# Patient Record
Sex: FEMALE | Race: WHITE | NOT HISPANIC OR LATINO | Employment: FULL TIME | URBAN - METROPOLITAN AREA
[De-identification: names, ages, dates, MRNs, and addresses within clinical notes are randomized per-mention and may not be internally consistent; named-entity substitution may affect disease eponyms.]

---

## 2017-01-27 ENCOUNTER — LAB CONVERSION - ENCOUNTER (OUTPATIENT)
Dept: OTHER | Facility: OTHER | Age: 57
End: 2017-01-27

## 2017-01-27 LAB — TSH SERPL DL<=0.05 MIU/L-ACNC: 1.75 MIU/L (ref 0.4–4.5)

## 2017-04-12 ENCOUNTER — ALLSCRIPTS OFFICE VISIT (OUTPATIENT)
Dept: OTHER | Facility: OTHER | Age: 57
End: 2017-04-12

## 2017-04-12 DIAGNOSIS — G56.00 CARPAL TUNNEL SYNDROME: ICD-10-CM

## 2017-04-12 DIAGNOSIS — J45.909 UNCOMPLICATED ASTHMA: ICD-10-CM

## 2017-04-12 DIAGNOSIS — J30.9 ALLERGIC RHINITIS: ICD-10-CM

## 2017-04-12 DIAGNOSIS — E78.5 HYPERLIPIDEMIA: ICD-10-CM

## 2017-08-04 ENCOUNTER — GENERIC CONVERSION - ENCOUNTER (OUTPATIENT)
Dept: OTHER | Facility: OTHER | Age: 57
End: 2017-08-04

## 2017-08-15 LAB
A/G RATIO (HISTORICAL): 1.8 (CALC) (ref 1–2.5)
ALBUMIN SERPL BCP-MCNC: 4.1 G/DL (ref 3.6–5.1)
ALP SERPL-CCNC: 83 U/L (ref 33–130)
ALT SERPL W P-5'-P-CCNC: 14 U/L (ref 6–29)
AST SERPL W P-5'-P-CCNC: 17 U/L (ref 10–35)
BASOPHILS # BLD AUTO: 0.7 %
BASOPHILS # BLD AUTO: 53 CELLS/UL (ref 0–200)
BILIRUB SERPL-MCNC: 0.7 MG/DL (ref 0.2–1.2)
BUN SERPL-MCNC: 19 MG/DL (ref 7–25)
BUN/CREA RATIO (HISTORICAL): NORMAL (CALC) (ref 6–22)
CALCIUM SERPL-MCNC: 9.4 MG/DL (ref 8.6–10.4)
CHLORIDE SERPL-SCNC: 105 MMOL/L (ref 98–110)
CHOLEST SERPL-MCNC: 238 MG/DL (ref 125–200)
CHOLEST/HDLC SERPL: 3.8 (CALC)
CO2 SERPL-SCNC: 27 MMOL/L (ref 20–31)
CREAT SERPL-MCNC: 0.8 MG/DL (ref 0.5–1.05)
DEPRECATED RDW RBC AUTO: 13.3 % (ref 11–15)
EGFR AFRICAN AMERICAN (HISTORICAL): 94 ML/MIN/1.73M2
EGFR-AMERICAN CALC (HISTORICAL): 81 ML/MIN/1.73M2
EOSINOPHIL # BLD AUTO: 248 CELLS/UL (ref 15–500)
EOSINOPHIL # BLD AUTO: 3.3 %
GAMMA GLOBULIN (HISTORICAL): 2.3 G/DL (CALC) (ref 1.9–3.7)
GLUCOSE (HISTORICAL): 93 MG/DL (ref 65–99)
HCT VFR BLD AUTO: 39.5 % (ref 35–45)
HDLC SERPL-MCNC: 62 MG/DL
HGB BLD-MCNC: 12.6 G/DL (ref 11.7–15.5)
LDL CHOLESTEROL (HISTORICAL): 160 MG/DL (CALC)
LYMPHOCYTES # BLD AUTO: 2760 CELLS/UL (ref 850–3900)
LYMPHOCYTES # BLD AUTO: 36.8 %
MCH RBC QN AUTO: 27.5 PG (ref 27–33)
MCHC RBC AUTO-ENTMCNC: 31.9 G/DL (ref 32–36)
MCV RBC AUTO: 86.1 FL (ref 80–100)
MONOCYTES # BLD AUTO: 630 CELLS/UL (ref 200–950)
MONOCYTES (HISTORICAL): 8.4 %
NEUTROPHILS # BLD AUTO: 3810 CELLS/UL (ref 1500–7800)
NEUTROPHILS # BLD AUTO: 50.8 %
NON-HDL-CHOL (CHOL-HDL) (HISTORICAL): 176 MG/DL (CALC)
PLATELET # BLD AUTO: 268 THOUSAND/UL (ref 140–400)
PMV BLD AUTO: 10.1 FL (ref 7.5–12.5)
POTASSIUM SERPL-SCNC: 4.2 MMOL/L (ref 3.5–5.3)
RBC # BLD AUTO: 4.59 MILLION/UL (ref 3.8–5.1)
SODIUM SERPL-SCNC: 140 MMOL/L (ref 135–146)
TOTAL PROTEIN (HISTORICAL): 6.4 G/DL (ref 6.1–8.1)
TRIGL SERPL-MCNC: 78 MG/DL
WBC # BLD AUTO: 7.5 THOUSAND/UL (ref 3.8–10.8)

## 2017-10-17 ENCOUNTER — ALLSCRIPTS OFFICE VISIT (OUTPATIENT)
Dept: OTHER | Facility: OTHER | Age: 57
End: 2017-10-17

## 2017-10-17 DIAGNOSIS — E78.5 HYPERLIPIDEMIA: ICD-10-CM

## 2017-10-17 DIAGNOSIS — J45.909 UNCOMPLICATED ASTHMA: ICD-10-CM

## 2017-11-01 NOTE — PROGRESS NOTES
Assessment    1  Asthma (493 90) (J45 909)   2  Hyperlipidemia (272 4) (E78 5)    Plan  Asthma, Hyperlipidemia    · (1) CBC/PLT/DIFF; Status:Active; Requested for:17Oct2017;    · (1) COMPREHENSIVE METABOLIC PANEL; Status:Active; Requested for:17Oct2017;    · (1) LIPID PANEL, FASTING; Status:Active; Requested LPH:[id number];   Immunization due    · Fluzone Quadrivalent Intramuscular Suspension    Discussion/Summary    #E#1 asthma - mild/intermittent  PT remains well-controlled on low dose Advair and is up to date with allergist    Continue present treatment  Recheck 6m  hyperlipidemia - LDL still high, partly due to diet noncompliance  Pt is still working on diet  Cont present meds  Recheck 6m health maintenance - up to date with colonoscopy and mammo  Flu shot given  Recheck labs in Feb and f/u 6m  Patient call for problems or concerns in the interim  The patient was counseled regarding diagnostic results,-- instructions for management,-- risk factor reductions,-- prognosis,-- patient and family education,-- impressions,-- risks and benefits of treatment options,-- importance of compliance with treatment  Possible side effects of new medications were reviewed with the patient/guardian today  The treatment plan was reviewed with the patient/guardian  The patient/guardian understands and agrees with the treatment plan      Chief Complaint  [address] [facility],[address] VISIT   Patient is here today for follow up of chronic conditions described in HPI  History of Present Illness  as above -f/u with hyperlipidemiapt still struggles with some bereavement but feels that she is better  Still cries at times but denies depression or anhedoniapt still not sure that she wants to get screened for Fahr's disease    no CP, lightheadedness or other CV symptoms with or without exertion  breathing stable on Advair twice a day again  Needed a rescue inhaler while on vacation in [place] but has not needed it since  CTS stable with bracing qHS  Hips bother her a little with overuse but are stable as wellup to date with Gyn, allergist, and colonoscopy (2015)  Up to date with mammo      Review of Systems   Constitutional: No fever, no chills, feels well, no tiredness, no recent weight gain or weight loss  Eyes: No complaints of eye pain, no red eyes, no eyesight problems, no discharge, no dry eyes, no itching of eyes  ENT: no complaints of earache, no loss of hearing, no nose bleeds, no nasal discharge, no sore throat, no hoarseness  Cardiovascular: as noted in HPI  Respiratory: No complaints of shortness of breath, no wheezing, no cough, no SOB on exertion, no orthopnea, no PND  Gastrointestinal: No complaints of abdominal pain, no constipation, no nausea or vomiting, no diarrhea, no bloody stools  Genitourinary: No complaints of dysuria, no incontinence, no pelvic pain, no dysmenorrhea, no vaginal discharge or bleeding  Musculoskeletal: as noted in HPI  Integumentary: No complaints of skin rash or lesions, no itching, no skin wounds, no breast pain or lump  Neurological: No complaints of headache, no confusion, no convulsions, no numbness, no dizziness or fainting, no tingling, no limb weakness, no difficulty walking  Psychiatric: as noted in HPI  Endocrine: No complaints of proptosis, no hot flashes, no muscle weakness, no deepening of the voice, no feelings of weakness  Hematologic/Lymphatic: No complaints of swollen glands, no swollen glands in the neck, does not bleed easily, does not bruise easily  Active Problems    1  Abscess Of The [place] Vaginalis (608 4)   2  Allergic rhinitis (477 9) (J30 9)   3  Asthma (493 90) (J45 909)   4  Bereavement (V62 82) (Z63 4)   5  Carpal tunnel syndrome (354 0) (G56 00)   6  Degenerative arthritis of thumb, right (715 34) (M18 11)   7  Encounter for screening mammogram for malignant neoplasm of breast (V76 12) (Z12 31)   8  Greater trochanteric bursitis of left hip (726 5) (M70 62)   9  Hyperlipidemia (272 4) (E78 5)   10  Immunization due (V05 9) (Z23)   11  Need for immunization against influenza (V04 81) (Z23)   12  Osteopenia (733 90) (M85 80)   13  Sacroiliitis (720 2) (M46 1)   14  Screening for osteoporosis (V82 81) (Z13 820)   15  Visit for pre-operative examination (V72 84) (S87 976)    Past Medical History    The active problems and past medical history were reviewed and updated today  Surgical History  1  History of Oral Surgery Tooth Extraction    The surgical history was reviewed and updated today  Family History  Mother    1  Family history of Colon Cancer (V16 0)   2  Family history of Fahr's disease   3  Family history of Hyperlipidemia  Father    4  Family history of Stroke Syndrome (V17 1)    The family history was reviewed and updated today  Social History     · Alcohol Use (History)   · Bereavement (V62 82) (Z63 4)   · Daily Coffee Consumption (___ Cups/Day)   · Daily Cola Consumption (___ Cans/Day)   · Daily Tea Consumption (___ Cups/Day)   · Marital History - Single   · Never A Smoker  The social history was reviewed and updated today  Current Meds   1  Advair Diskus 100-50 MCG/DOSE Inhalation Aerosol Powder Breath Activated; Therapy: 86URU8934 to (Last Rx:17Jun2010)  Requested for: 56YDN1636 Ordered   2  Calcium 600 MG Oral Tablet; take 2 tablet daily; Therapy: (Recorded:00Bkx4869) to Recorded   3  Claritin 10 MG Oral Tablet; take 1 tablet daily as needed Recorded   4  Crestor 20 MG Oral Tablet; Take 1 tablet daily; Therapy: 09IJZ1357 to (Evaluate:02Apr2017)  Requested for: 07Apr2016; Last Rx:07Apr2016 Ordered   5  [employer] Glucosamine-Chondroitin Oral Tablet; Take as directed Recorded   6  Multivitamins Oral Capsule; TAKE 1 CAPSULE Daily Recorded   7  Vitamin D 400 UNIT TABS; Therapy: (Recorded:90Mlm5336) to Recorded    The medication list was reviewed and updated today  Allergies  1   Penicillins    Vitals  Vital Signs    Recorded: 50LMQ8807 07:47AM Temperature 97 6 F   Heart Rate 74   Respiration 16   Systolic 919   Diastolic 68   Height 4 ft 9 in   Weight 166 lb 4 oz   BMI Calculated 35 98   BSA Calculated 1 66       Physical Exam   Constitutional  General appearance: Abnormal  -- mildly upset appearing female in NAD  Head and Face  Head and face: Normal    Eyes  Conjunctiva and lids: No swelling, erythema or discharge  Pupils and irises: Equal, round, reactive to light  Ophthalmoscopic examination: Normal fundi and optic discs  Ears, Nose, Mouth, and Throat  External inspection of ears and nose: Normal    Otoscopic examination: Tympanic membranes translucent with normal light reflex  Canals patent without erythema  Nasal mucosa, septum, and turbinates: Normal without edema or erythema  Lips, teeth, and gums: Normal, good dentition  Oropharynx: Normal with no erythema, edema, exudate or lesions  Neck  Neck: Supple, symmetric, trachea midline, no masses  Thyroid: Normal, no thyromegaly  Pulmonary  Respiratory effort: No increased work of breathing or signs of respiratory distress  Auscultation of lungs: Clear to auscultation  Cardiovascular  Auscultation of heart: Normal rate and rhythm, normal S1 and S2, no murmurs  Carotid pulses: 2+ bilaterally  Abdominal aorta: Normal    Pedal pulses: 2+ bilaterally  Peripheral vascular exam: Normal    Examination of extremities for edema and/or varicosities: Normal    Abdomen  Abdomen: Non-tender, no masses  Liver and spleen: No hepatomegaly or splenomegaly  Lymphatic  Palpation of lymph nodes in neck: No lymphadenopathy  Palpation of lymph nodes in other areas: No lymphadenopathy  Musculoskeletal  Gait and station: Normal    Digits and nails: Normal without clubbing or cyanosis  Joints, bones, and muscles: Normal    Muscle strength/tone: Normal    Skin  Skin and subcutaneous tissue: Normal without rashes or lesions  Neurologic  Cranial nerves: Cranial nerves II-XII intact  Cortical function: Normal mental status  Reflexes: 2+ and symmetric  Sensation: No sensory loss  Psychiatric  Mood and affect: Abnormal  -- as above  Results/Data  (1) LIPID PANEL, FASTING 37RCC2057 07:41AM Candi Lucero     Test Name Result Flag Reference   CHOLESTEROL, TOTAL 238 mg/dL H 125-200   HDL CHOLESTEROL 62 mg/dL  > OR = 46   TRIGLICERIDES 78 mg/dL  <301   LDL-CHOLESTEROL 160 mg/dL (calc) H <130     Desirable range <100 mg/dL for patients with CHD or diabetes and <70 mg/dL for diabetic patients with known heart disease  CHOL/HDLC RATIO 3 8 (calc)  < OR = 5 0   NON HDL CHOLESTEROL 176 mg/dL (calc) H      Target for non-HDL cholesterol is 30 mg/dL higher than  LDL cholesterol target  (1) COMPREHENSIVE METABOLIC PANEL 51YYJ7486 43:30YP Candi Lucero     Test Name Result Flag Reference   GLUCOSE 93 mg/dL  65-99   Fasting reference interval   UREA NITROGEN (BUN) 19 mg/dL  7-25   CREATININE 0 80 mg/dL  0 50-1 05     For patients >52years of age, the reference limit for Creatinine is approximately 13% higher for people identified as -American  eGFR NON-AFR   AMERICAN 81 mL/min/1 73m2  > OR = 60   eGFR AFRICAN AMERICAN 94 mL/min/1 73m2  > OR = 60   BUN/CREATININE RATIO   6-48   NOT APPLICABLE (calc)   SODIUM 140 mmol/L  135-146   POTASSIUM 4 2 mmol/L  3 5-5 3   CHLORIDE 105 mmol/L     CARBON DIOXIDE 27 mmol/L  20-31   CALCIUM 9 4 mg/dL  8 6-10 4   PROTEIN, TOTAL 6 4 g/dL  6 1-8 1   ALBUMIN 4 1 g/dL  3 6-5 1   GLOBULIN 2 3 g/dL (calc)  1 9-3 7   ALBUMIN/GLOBULIN RATIO 1 8 (calc)  1 0-2 5   BILIRUBIN, TOTAL 0 7 mg/dL  0 2-1 2   ALKALINE PHOSPHATASE 83 U/L     AST 17 U/L  10-35   ALT 14 U/L  6-29     (1) CBC/PLT/DIFF [id number] 07:41AM Brice Lucero     REPORT COMMENT: FASTING:YES     Test Name Result Flag Reference   WHITE BLOOD CELL COUNT 7 5 Thousand/uL  3 8-10 8   RED BLOOD CELL COUNT 4 59 Million/uL  3 80-5 10   HEMOGLOBIN 12 6 g/dL  11 7-15 5 HEMATOCRIT 39 5 %  35 0-45 0   MCV 86 1 fL  80 0-100 0   MCH 27 5 pg  27 0-33 0   MCHC 31 9 g/dL L 32 0-36 0   RDW 13 3 %  11 0-15 0   PLATELET COUNT 739 Thousand/uL  140-400   ABSOLUTE NEUTROPHILS 3810 cells/uL  7101-9886   ABSOLUTE LYMPHOCYTES 2760 cells/uL  850-3900   ABSOLUTE MONOCYTES 630 cells/uL  200-950   ABSOLUTE EOSINOPHILS 248 cells/uL     ABSOLUTE BASOPHILS 53 cells/uL  0-200   NEUTROPHILS 50 8 %     LYMPHOCYTES 36 8 %     MONOCYTES 8 4 %     EOSINOPHILS 3 3 %     BASOPHILS 0 7 %     MPV 10 1 fL  7 5-12 5     Future Appointments    Date/Time Provider Specialty Site   04/24/2018 08:00 AM LESLEE Lim   [address]       Signatures   Electronically signed by : LESLEE Simms ; Oct 17 2017  8:38AM EST                       (Author)

## 2018-01-12 VITALS
WEIGHT: 166.25 LBS | HEIGHT: 57 IN | DIASTOLIC BLOOD PRESSURE: 68 MMHG | TEMPERATURE: 97.6 F | SYSTOLIC BLOOD PRESSURE: 124 MMHG | HEART RATE: 74 BPM | RESPIRATION RATE: 16 BRPM | BODY MASS INDEX: 35.87 KG/M2

## 2018-01-14 VITALS
BODY MASS INDEX: 34.43 KG/M2 | RESPIRATION RATE: 16 BRPM | TEMPERATURE: 97.6 F | HEIGHT: 58 IN | HEART RATE: 74 BPM | WEIGHT: 164 LBS | DIASTOLIC BLOOD PRESSURE: 64 MMHG | SYSTOLIC BLOOD PRESSURE: 122 MMHG

## 2018-03-17 DIAGNOSIS — E78.00 HYPERCHOLESTEROLEMIA: Primary | ICD-10-CM

## 2018-03-18 RX ORDER — ROSUVASTATIN CALCIUM 20 MG/1
TABLET, COATED ORAL
Qty: 90 TABLET | Refills: 3 | Status: SHIPPED | OUTPATIENT
Start: 2018-03-18 | End: 2019-04-03 | Stop reason: SDUPTHER

## 2018-04-24 ENCOUNTER — OFFICE VISIT (OUTPATIENT)
Dept: FAMILY MEDICINE CLINIC | Facility: CLINIC | Age: 58
End: 2018-04-24
Payer: COMMERCIAL

## 2018-04-24 VITALS
TEMPERATURE: 97.6 F | HEIGHT: 58 IN | DIASTOLIC BLOOD PRESSURE: 82 MMHG | HEART RATE: 74 BPM | WEIGHT: 166.4 LBS | BODY MASS INDEX: 34.93 KG/M2 | SYSTOLIC BLOOD PRESSURE: 124 MMHG

## 2018-04-24 DIAGNOSIS — G89.29 CHRONIC BILATERAL LOW BACK PAIN WITH LEFT-SIDED SCIATICA: ICD-10-CM

## 2018-04-24 DIAGNOSIS — J45.20 MILD INTERMITTENT ASTHMA WITHOUT COMPLICATION: ICD-10-CM

## 2018-04-24 DIAGNOSIS — E78.2 MIXED HYPERLIPIDEMIA: Primary | ICD-10-CM

## 2018-04-24 DIAGNOSIS — M54.42 CHRONIC BILATERAL LOW BACK PAIN WITH LEFT-SIDED SCIATICA: ICD-10-CM

## 2018-04-24 PROCEDURE — 99214 OFFICE O/P EST MOD 30 MIN: CPT | Performed by: FAMILY MEDICINE

## 2018-04-24 RX ORDER — IBUPROFEN 200 MG
2 CAPSULE ORAL DAILY
COMMUNITY

## 2018-04-24 RX ORDER — MULTIVITAMIN
1 CAPSULE ORAL DAILY
COMMUNITY

## 2018-04-24 RX ORDER — ERGOCALCIFEROL (VITAMIN D2) 10 MCG
TABLET ORAL
COMMUNITY

## 2018-04-24 RX ORDER — LORATADINE 10 MG/1
1 TABLET ORAL DAILY
COMMUNITY

## 2018-04-30 ENCOUNTER — EVALUATION (OUTPATIENT)
Dept: PHYSICAL THERAPY | Facility: CLINIC | Age: 58
End: 2018-04-30
Payer: COMMERCIAL

## 2018-04-30 DIAGNOSIS — G89.29 CHRONIC BILATERAL LOW BACK PAIN WITH LEFT-SIDED SCIATICA: Primary | ICD-10-CM

## 2018-04-30 DIAGNOSIS — M54.42 CHRONIC BILATERAL LOW BACK PAIN WITH LEFT-SIDED SCIATICA: Primary | ICD-10-CM

## 2018-04-30 PROCEDURE — 97112 NEUROMUSCULAR REEDUCATION: CPT | Performed by: PHYSICAL THERAPIST

## 2018-04-30 PROCEDURE — 97162 PT EVAL MOD COMPLEX 30 MIN: CPT | Performed by: PHYSICAL THERAPIST

## 2018-04-30 PROCEDURE — 97110 THERAPEUTIC EXERCISES: CPT | Performed by: PHYSICAL THERAPIST

## 2018-04-30 PROCEDURE — G8991 OTHER PT/OT GOAL STATUS: HCPCS | Performed by: PHYSICAL THERAPIST

## 2018-04-30 PROCEDURE — G8990 OTHER PT/OT CURRENT STATUS: HCPCS | Performed by: PHYSICAL THERAPIST

## 2018-04-30 NOTE — PROGRESS NOTES
PT Evaluation     Today's date: 2018  Patient name: Katja Estevez  : 1960  MRN: [id number]  Referring provider: Josiah Kincaid  Dx:   Encounter Diagnosis     ICD-10-CM    1  Chronic bilateral low back pain with left-sided sciatica M54 42 Ambulatory referral to Physical Therapy    G89 29                   Assessment  Impairments: abnormal gait, abnormal or restricted ROM, activity intolerance, impaired physical strength, lacks appropriate home exercise program and pain with function    Assessment details: Patient presents chronic recurring low back pain and symptoms consistent with gluteal bursitis  Treatment to include exercises for the low back, pelvis and legs  A home exercise program was initiated  Understanding of Dx/Px/POC: good   Prognosis: good    Goals  STG Patient is independent with HEP   STG Increase strength by half grade in 2 weeks  STG Range of motion is improved by 25% in 4 weeks  LTG Decrease pain 20-50% in 4 weeks  LTG Balance & endurance & gait & locomotion are improved by 50% in 4 weeks  LTG Stair climbing is improved to prior level of function  LTG ADL performance improved to prior level of function        Plan  Patient would benefit from: skilled PT  Planned therapy interventions: manual therapy, graded exercise, graded activity, home exercise program, therapeutic activities, therapeutic exercise, strengthening and functional ROM exercises  Frequency: 1x week  Duration in visits: 4  Duration in weeks: 4        Subjective Evaluation    History of Present Illness  Mechanism of injury: Patient reports multi-year history of low back pain that increases with a lot of lifting and bending activities  Over 6 months ago she had gradual onset left lateral thigh pain from the hip to the knee that increases with housework, prolonged sitting (~1hour), prolonged standing, sometimes lying on the right side > left     She also has more recent onset right lateral thigh pain that increases with stair climbing and going up hills  She notes difficulty crossing her legs  Pain  At best pain ratin  At worst pain ratin  Quality: dull ache    Patient Goals  Patient goals for therapy: increased strength, independence with ADLs/IADLs, return to sport/leisure activities and decreased pain          Objective     Active Range of Motion     Lumbar   Flexion: WFL  Extension: 18 degrees with pain  Left lateral flexion: WFL  Right lateral flexion: [facility]    Additional Active Range of Motion Details  Posture:  Left lateral pelvic shift, mild    Strength/Myotome Testing     Left Hip   Planes of Motion   Flexion: 4  Extension: 4+  Abduction: 3+  External rotation: 4+  Internal rotation: 5    Right Hip   Planes of Motion   Flexion: 4  Extension: 4+  Abduction: 3+  External rotation: 4+  Internal rotation: 5    General Comments     Lumbar Comments  Tender to palpation:  Left SIJ, left L5 paraspinals, bilateral glut medius insertion, left ITB      Flowsheet Rows      Most Recent Value   PT/OT G-Codes   Current Score  61   Projected Score  70   Assessment Type  Evaluation   G code set  Other PT/OT Primary   Other PT Primary Current Status ()  CJ   Other PT Primary Goal Status ()  CJ          Precautions: none    Daily Treatment Diary     Manual              ITB stretching? Sacral/Pelvic rocking                                                        Exercise Diary              SKC :10x5            Glut stretch :10x5            Bridges  With ER 20x red T            Piriformis stretch :10x5            Standing ITB stretch :10x5            Standing hip 3way DLS             Heel touches?                                                                                                                                                                                           Modalities

## 2018-05-09 ENCOUNTER — OFFICE VISIT (OUTPATIENT)
Dept: PHYSICAL THERAPY | Facility: CLINIC | Age: 58
End: 2018-05-09
Payer: COMMERCIAL

## 2018-05-09 DIAGNOSIS — G89.29 CHRONIC BILATERAL LOW BACK PAIN WITH LEFT-SIDED SCIATICA: Primary | ICD-10-CM

## 2018-05-09 DIAGNOSIS — M54.42 CHRONIC BILATERAL LOW BACK PAIN WITH LEFT-SIDED SCIATICA: Primary | ICD-10-CM

## 2018-05-09 PROCEDURE — G8991 OTHER PT/OT GOAL STATUS: HCPCS | Performed by: PHYSICAL THERAPIST

## 2018-05-09 PROCEDURE — G8990 OTHER PT/OT CURRENT STATUS: HCPCS | Performed by: PHYSICAL THERAPIST

## 2018-05-16 ENCOUNTER — OFFICE VISIT (OUTPATIENT)
Dept: PHYSICAL THERAPY | Facility: CLINIC | Age: 58
End: 2018-05-16
Payer: COMMERCIAL

## 2018-05-16 DIAGNOSIS — M54.42 CHRONIC BILATERAL LOW BACK PAIN WITH LEFT-SIDED SCIATICA: Primary | ICD-10-CM

## 2018-05-16 DIAGNOSIS — G89.29 CHRONIC BILATERAL LOW BACK PAIN WITH LEFT-SIDED SCIATICA: Primary | ICD-10-CM

## 2018-05-16 PROCEDURE — 97140 MANUAL THERAPY 1/> REGIONS: CPT | Performed by: PHYSICAL THERAPIST

## 2018-05-16 PROCEDURE — 97112 NEUROMUSCULAR REEDUCATION: CPT | Performed by: PHYSICAL THERAPIST

## 2018-05-16 PROCEDURE — 97110 THERAPEUTIC EXERCISES: CPT | Performed by: PHYSICAL THERAPIST

## 2018-05-16 NOTE — PROGRESS NOTES
Daily Note     Today's date: 2018  Patient name: Nima Gutierrez  : 1960  MRN: [id number]  Referring provider: Mortimer Bream*  Dx:   Encounter Diagnosis     ICD-10-CM    1  Chronic bilateral low back pain with left-sided sciatica M54 42     G89 29                   Subjective: Patient stated moderate stiffness prior to treatment session which she attributed to the weather  Objective: See treatment diary below  Precautions: none     Daily Treatment Diary      Manual                   ITB stretching?    :20x5ea  KK                 Sacral/Pelvic rocking    30x  KK                                                                                               Exercise Diary                   SKC :10x5  :10X5  :10x5                 Glut stretch :10x5  :10X5  :10x5                 Bridges  With ER 20x red T                     Piriformis stretch :10x5                     Standing ITB stretch :10x5                     Standing hip 3way DLS                       Heel touches    2x10  2x 10                  pball bridges   20x  20x                  pball leg curls   20x 20x                  prone on elbows   :30x4  :30x4                  DLS march, flex hip as far as possible   :[id number]  :02x20 ea                  Hip ER isometrics                        Hip add      20x5"                                                                                                                                                                                               Modalities                                                                                                          Assessment: Patient performed exercises without c/o pain; positive response to manual intervention  Plan: Progress treatment as tolerated

## 2018-05-23 ENCOUNTER — OFFICE VISIT (OUTPATIENT)
Dept: PHYSICAL THERAPY | Facility: CLINIC | Age: 58
End: 2018-05-23
Payer: COMMERCIAL

## 2018-05-23 DIAGNOSIS — G89.29 CHRONIC BILATERAL LOW BACK PAIN WITH LEFT-SIDED SCIATICA: Primary | ICD-10-CM

## 2018-05-23 DIAGNOSIS — M54.42 CHRONIC BILATERAL LOW BACK PAIN WITH LEFT-SIDED SCIATICA: Primary | ICD-10-CM

## 2018-05-23 PROCEDURE — 97110 THERAPEUTIC EXERCISES: CPT | Performed by: PHYSICAL THERAPIST

## 2018-05-23 PROCEDURE — 97140 MANUAL THERAPY 1/> REGIONS: CPT | Performed by: PHYSICAL THERAPIST

## 2018-05-23 NOTE — PROGRESS NOTES
PT Re-Evaluation      Today's date: 2018  Patient name: Leland Severs  : 1960  MRN: [id number]  Referring provider: Love Zamora  Dx:         Encounter Diagnosis       ICD-10-CM     1  Chronic bilateral low back pain with left-sided sciatica M54 42 Ambulatory referral to Physical Therapy     G89 29              Assessment  Impairments: abnormal gait, abnormal or restricted ROM, activity intolerance, impaired physical strength, lacks appropriate home exercise program and pain with function    Assessment details: Patient presents chronic recurring low back pain and symptoms consistent with gluteal bursitis  She is progressing well, recommend 1-2 visits to continue progression of exercises and update home exercises  Understanding of Dx/Px/POC: good   Prognosis: good   Goals  STG Patient is independent with HEP (met)  STG Increase strength by half grade in 2 weeks  STG Range of motion is improved by 25% in 4 weeks  LTG Decrease pain 20-50% in 4 weeks (met, resume)  LTG Balance & endurance & gait & locomotion are improved by 50% in 4 weeks  LTG Stair climbing is improved to prior level of function  LTG ADL performance improved to prior level of function (partially met, resume)       Plan  Patient would benefit from: skilled PT  Planned therapy interventions: manual therapy, graded exercise, graded activity, home exercise program, therapeutic activities, therapeutic exercise, strengthening and functional ROM exercises  Frequency: 1x week  Duration in visits: 2  Duration in weeks: 2        Subjective Evaluation     History of Present Illness  Mechanism of injury: Patient reports multi-year history of low back pain that increases with a lot of lifting and bending activities  Over 6 months ago she had gradual onset left lateral thigh pain from the hip to the knee that increases with housework, prolonged sitting (~1hour), prolonged standing, sometimes lying on the right side > left     She also has more recent onset right lateral thigh pain that increases with stair climbing and going up hills  She notes difficulty crossing her legs  Her back and hip pain have both improved and she has been consistent with home exercsies  Pain  At best pain ratin  At worst pain ratin  Quality: dull ache     Patient Goals  Patient goals for therapy: increased strength, independence with ADLs/IADLs, return to sport/leisure activities and decreased pain           Objective      Active Range of Motion      Lumbar   Flexion: WFL  Extension: 18 degrees with pain  Left lateral flexion: WFL  Right lateral flexion: [facility]    Additional Active Range of Motion Details  Posture:  Left lateral pelvic shift, mild     Strength/Myotome Testing      Left Hip   Planes of Motion   Flexion: 4+  Extension: 4+  Abduction: 3+  External rotation: 4+  Internal rotation: 5     Right Hip   Planes of Motion   Flexion: 4+  Extension: 4+  Abduction: 3+  External rotation: 4+  Internal rotation: 5     General Comments      Lumbar Comments  Tender to palpation:  Left SIJ, left L5 paraspinals, bilateral glut medius insertion, left ITB    Daily Note     Today's date: 2018  Patient name: Christy Jiang  : 1960  MRN: [id number]  Referring provider: Tawana Emerson*  Dx:   Encounter Diagnosis     ICD-10-CM    1  Chronic bilateral low back pain with left-sided sciatica M54 42     G89 29                   Subjective: states she is definitely feeling better with decreased hip and back pain  She has been performing exercises at home consistently        Objective: See treatment diary below  Precautions: none     Daily Treatment Diary      Manual                 ITB stretching?    :20x5ea  KK  :20x5               Sacral/Pelvic rocking    30x  KK  30ea                                                                                             Exercise Diary                 [facility] :10x5  :10X5  :10x5                 Glut stretch :10x5  :10X5  :10x5  :20x3               Bridges  With ER 20x red T                     Piriformis stretch :10x5                     Standing ITB stretch :10x5                     Standing hip 3way DLS        20XQ               Heel touches    2x10  2x 10  2x10                pball bridges   20x  20x  20x                pball leg curls   20x 20x  20x                prone on elbows   :30x4  :30x4  HEP                DLS march, flex hip as far as possible   :[id number]  :02x20 ea  2x10                Hip ER isometrics       :05x20                Hip add      20x5"  :05x20                hip flexor stretch leg on 2nd step       :20x4                                                                                                                                                                   Assessment: Patient performed exercises without c/o pain; positive response to manual intervention  Plan: Progress treatment as tolerated

## 2018-05-29 ENCOUNTER — OFFICE VISIT (OUTPATIENT)
Dept: PHYSICAL THERAPY | Facility: CLINIC | Age: 58
End: 2018-05-29
Payer: COMMERCIAL

## 2018-05-29 DIAGNOSIS — M54.42 CHRONIC BILATERAL LOW BACK PAIN WITH LEFT-SIDED SCIATICA: Primary | ICD-10-CM

## 2018-05-29 DIAGNOSIS — G89.29 CHRONIC BILATERAL LOW BACK PAIN WITH LEFT-SIDED SCIATICA: Primary | ICD-10-CM

## 2018-05-29 PROCEDURE — 97140 MANUAL THERAPY 1/> REGIONS: CPT | Performed by: PHYSICAL THERAPY ASSISTANT

## 2018-05-29 PROCEDURE — 97112 NEUROMUSCULAR REEDUCATION: CPT | Performed by: PHYSICAL THERAPY ASSISTANT

## 2018-05-29 PROCEDURE — 97110 THERAPEUTIC EXERCISES: CPT | Performed by: PHYSICAL THERAPY ASSISTANT

## 2018-05-29 NOTE — PROGRESS NOTES
Daily Note     Today's date: 2018  Patient name: Man Gutiérrez  : 1960  MRN: [id number]  Referring provider: Thuan Ortiz  Dx:   Encounter Diagnosis     ICD-10-CM    1  Chronic bilateral low back pain with left-sided sciatica M54 42     G89 29                   Subjective: Pt reports mild anterior hip pain at the start of therapy today  Denies any LBP today  Objective: See treatment diary below  Precautions: none     Daily Treatment Diary      Manual               ITB stretching?    :20x5ea  KK  :20x5  :20 x 5             Sacral/Pelvic rocking    30x  KK  30ea  NP                                                                                           Exercise Diary               SKC :10x5  :10X5  :10x5    :10 x 5             Glut stretch :10x5  :10X5  :10x5  :20x3  :10 x 5             Bridges  With ER 20x red T                     Piriformis stretch :10x5                     Standing ITB stretch :10x5                     Standing hip 3way DLS        45KN  20Z  each             Heel touches    2x10  2x 10  2x10  2x10              pball bridges   20x  20x  20x  20x              pball leg curls   20x 20x  20x  20x              prone on elbows   :30x4  :30x4  HEP                DLS march, flex hip as far as possible   :[id number]  :02x20 ea  2x10  2x10              Hip ER isometrics       :05x20  :05 x 20              Hip add      20x5"  :05x20  :05 x20              hip flexor stretch leg on 2nd step       :20x4  :20 x 4                                                                                                                                                                 Assessment: Patient performed exercises without c/o pain; positive response to manual intervention  Plan: Progress treatment as tolerated

## 2018-05-30 ENCOUNTER — APPOINTMENT (OUTPATIENT)
Dept: PHYSICAL THERAPY | Facility: CLINIC | Age: 58
End: 2018-05-30
Payer: COMMERCIAL

## 2018-05-31 LAB
ALBUMIN SERPL-MCNC: 4.1 G/DL (ref 3.6–5.1)
ALBUMIN/GLOB SERPL: 1.7 (CALC) (ref 1–2.5)
ALP SERPL-CCNC: 91 U/L (ref 33–130)
ALT SERPL-CCNC: 13 U/L (ref 6–29)
AST SERPL-CCNC: 17 U/L (ref 10–35)
BILIRUB SERPL-MCNC: 0.7 MG/DL (ref 0.2–1.2)
BUN SERPL-MCNC: 16 MG/DL (ref 7–25)
BUN/CREAT SERPL: ABNORMAL (CALC) (ref 6–22)
CALCIUM SERPL-MCNC: 9.3 MG/DL (ref 8.6–10.4)
CHLORIDE SERPL-SCNC: 105 MMOL/L (ref 98–110)
CHOLEST SERPL-MCNC: 226 MG/DL
CHOLEST/HDLC SERPL: 3.8 (CALC)
CO2 SERPL-SCNC: 28 MMOL/L (ref 20–31)
CREAT SERPL-MCNC: 0.81 MG/DL (ref 0.5–1.05)
GLOBULIN SER CALC-MCNC: 2.4 G/DL (CALC) (ref 1.9–3.7)
GLUCOSE SERPL-MCNC: 101 MG/DL (ref 65–99)
HDLC SERPL-MCNC: 59 MG/DL
LDLC SERPL CALC-MCNC: 151 MG/DL (CALC)
NONHDLC SERPL-MCNC: 167 MG/DL (CALC)
POTASSIUM SERPL-SCNC: 4.2 MMOL/L (ref 3.5–5.3)
PROT SERPL-MCNC: 6.5 G/DL (ref 6.1–8.1)
SL AMB EGFR AFRICAN AMERICAN: 93 ML/MIN/1.73M2
SL AMB EGFR NON AFRICAN AMERICAN: 80 ML/MIN/1.73M2
SODIUM SERPL-SCNC: 140 MMOL/L (ref 135–146)
TRIGL SERPL-MCNC: 63 MG/DL

## 2018-06-06 ENCOUNTER — EVALUATION (OUTPATIENT)
Dept: PHYSICAL THERAPY | Facility: CLINIC | Age: 58
End: 2018-06-06
Payer: COMMERCIAL

## 2018-06-06 DIAGNOSIS — M54.42 CHRONIC BILATERAL LOW BACK PAIN WITH LEFT-SIDED SCIATICA: Primary | ICD-10-CM

## 2018-06-06 DIAGNOSIS — G89.29 CHRONIC BILATERAL LOW BACK PAIN WITH LEFT-SIDED SCIATICA: Primary | ICD-10-CM

## 2018-06-06 PROCEDURE — 97110 THERAPEUTIC EXERCISES: CPT | Performed by: PHYSICAL THERAPIST

## 2018-06-06 PROCEDURE — G8991 OTHER PT/OT GOAL STATUS: HCPCS | Performed by: PHYSICAL THERAPIST

## 2018-06-06 PROCEDURE — G8990 OTHER PT/OT CURRENT STATUS: HCPCS | Performed by: PHYSICAL THERAPIST

## 2018-06-06 PROCEDURE — 97140 MANUAL THERAPY 1/> REGIONS: CPT | Performed by: PHYSICAL THERAPIST

## 2018-06-06 NOTE — PROGRESS NOTES
Daily Note     Today's date: 2018  Patient name: Maximus Jiang  : 1960  MRN: [id number]  Referring provider: Ruth Ann Lindo*  Dx:   Encounter Diagnosis     ICD-10-CM    1  Chronic bilateral low back pain with left-sided sciatica M54 42     G89 29               Subjective: patient states her back is feeling better and she will continue with exercises at home  Objective: See treatment diary below  Reviewed form on home exercises; no pain reported throughout treatment    Precautions: none     Daily Treatment Diary      Manual             ITB stretching?    :20x5ea  KK  :20x5  :20 x 5  :20x5           Sacral/Pelvic rocking    30x  KK  30ea  NP  30x                                                                                         Exercise Diary             SKC :10x5  :10X5  :10x5    :10 x 5  :10x5 alt with below           Glut stretch :10x5  :10X5  :10x5  :20x3  :10 x 5  :10x5           Bridges  With ER 20x red T                     Piriformis stretch :10x5                     Standing ITB stretch :10x5                     Standing hip 3way DLS        47ZB  23B  each             Heel touches    2x10  2x 10  2x10  2x10  2x10            pball bridges   20x  20x  20x  20x  20x            pball leg curls   20x 20x  20x  20x  30x            prone on elbows   :30x4  :30x4  HEP                DLS march, flex hip as far as possible   :[id number]  :02x20 ea  2x10  2x10  2x10            Hip ER isometrics       :05x20  :05 x 20  :05x20            Hip add      20x5"  :05x20  :05 x20  :05x20            hip flexor stretch leg on 2nd step       :20x4  :20 x 4  :20x4                                                                                                                                                               Assessment: Patient performed exercises without c/o pain; positive response to manual intervention        Plan: discharge to a home exercise program

## 2018-09-05 ENCOUNTER — OFFICE VISIT (OUTPATIENT)
Dept: FAMILY MEDICINE CLINIC | Facility: CLINIC | Age: 58
End: 2018-09-05
Payer: COMMERCIAL

## 2018-09-05 VITALS
HEART RATE: 72 BPM | DIASTOLIC BLOOD PRESSURE: 68 MMHG | HEIGHT: 58 IN | BODY MASS INDEX: 34.51 KG/M2 | TEMPERATURE: 98.2 F | WEIGHT: 164.4 LBS | SYSTOLIC BLOOD PRESSURE: 108 MMHG
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
## 2018-09-05 DIAGNOSIS — L03.116 CELLULITIS OF LEFT LOWER EXTREMITY: ICD-10-CM

## 2018-09-05 PROCEDURE — 3008F BODY MASS INDEX DOCD: CPT | Performed by: FAMILY MEDICINE

## 2018-09-05 PROCEDURE — 99213 OFFICE O/P EST LOW 20 MIN: CPT | Performed by: FAMILY MEDICINE

## 2018-09-05 RX ORDER — DOXYCYCLINE 100 MG/1
100 CAPSULE ORAL 2 TIMES DAILY
Qty: 20 CAPSULE | Refills: 0 | Status: SHIPPED | OUTPATIENT
Start: 2018-09-05 | End: 2018-09-15

## 2018-09-05 NOTE — PROGRESS NOTES
Assessment/Plan:      Diagnoses and all orders for this visit:    Insect bite, initial encounter  -     doxycycline monohydrate (MONODOX) 100 mg capsule; Take 1 capsule (100 mg total) by mouth 2 (two) times a day for 10 days    Cellulitis of left lower extremity  -     doxycycline monohydrate (MONODOX) 100 mg capsule; Take 1 capsule (100 mg total) by mouth 2 (two) times a day for 10 days          Subjective:     Patient ID: Kirill Vargas is a 60 y o  female  Two day history of left lower anterior leg insect bite  Slowly worsening erythema with "ache" radiating up the leg  No fever chills  No improving with topical treatment  Review of Systems   Constitutional: Negative  Musculoskeletal: Positive for myalgias  Negative for arthralgias and gait problem  Skin: Positive for color change, rash and wound  Negative for pallor  Neurological: Negative for weakness and numbness  Hematological: Negative  Objective:     Physical Exam   Constitutional: She appears well-developed and well-nourished  Musculoskeletal:        Legs:  Neurological: No sensory deficit  She exhibits normal muscle tone     Skin:

## 2018-09-07 ENCOUNTER — TELEPHONE (OUTPATIENT)
Dept: FAMILY MEDICINE CLINIC | Facility: CLINIC | Age: 58
End: 2018-09-07

## 2018-09-07 NOTE — TELEPHONE ENCOUNTER
Was to call you back regarding tick bite you saw her for  It is getting better, leg is not aching as much and swelling is going down

## 2019-01-07 ENCOUNTER — OFFICE VISIT (OUTPATIENT)
Dept: FAMILY MEDICINE CLINIC | Facility: CLINIC | Age: 59
End: 2019-01-07
Payer: COMMERCIAL

## 2019-01-07 VITALS
SYSTOLIC BLOOD PRESSURE: 110 MMHG | BODY MASS INDEX: 35.3 KG/M2 | TEMPERATURE: 98 F | HEART RATE: 72 BPM | HEIGHT: 58 IN | WEIGHT: 168.2 LBS | DIASTOLIC BLOOD PRESSURE: 70 MMHG

## 2019-01-07 DIAGNOSIS — G56.03 BILATERAL CARPAL TUNNEL SYNDROME: ICD-10-CM

## 2019-01-07 DIAGNOSIS — E78.2 MIXED HYPERLIPIDEMIA: ICD-10-CM

## 2019-01-07 DIAGNOSIS — B37.2 CANDIDAL DERMATITIS: ICD-10-CM

## 2019-01-07 DIAGNOSIS — Z23 IMMUNIZATION DUE: ICD-10-CM

## 2019-01-07 DIAGNOSIS — J45.20 MILD INTERMITTENT ASTHMA WITHOUT COMPLICATION: Primary | ICD-10-CM

## 2019-01-07 PROCEDURE — 90682 RIV4 VACC RECOMBINANT DNA IM: CPT | Performed by: FAMILY MEDICINE

## 2019-01-07 PROCEDURE — 90471 IMMUNIZATION ADMIN: CPT | Performed by: FAMILY MEDICINE

## 2019-01-07 PROCEDURE — 99214 OFFICE O/P EST MOD 30 MIN: CPT | Performed by: FAMILY MEDICINE

## 2019-01-07 PROCEDURE — 3008F BODY MASS INDEX DOCD: CPT | Performed by: FAMILY MEDICINE

## 2019-01-07 NOTE — ASSESSMENT & PLAN NOTE
LDL still elevated despite rosuvastatin 20mg qd  Cont present care  Monitor diet  Attempt to increase exercise   Recheck 6m

## 2019-01-07 NOTE — PROGRESS NOTES
Assessment/Plan:    Asthma  Stable  Cont present care  Recheck 6m    Carpal tunnel syndrome  No muscle wasting  Discussed with pt  Cont conservative care  Recheck 6m - earlier if worse    Hyperlipidemia  LDL still elevated despite rosuvastatin 20mg qd  Cont present care  Monitor diet  Attempt to increase exercise  Recheck 6m       Diagnoses and all orders for this visit:    Mild intermittent asthma without complication    Mixed hyperlipidemia  -     Comprehensive metabolic panel; Future  -     Lipid panel; Future    Bilateral carpal tunnel syndrome    Candidal dermatitis  Comments:  recurrent though not presently active  refilled meds  Orders:  -     econazole nitrate 1 % cream; Apply topically daily    Immunization due  -     PREFERRED: influenza vaccine, [id number], quadrivalent, recombinant, PF, 0 5 mL, for patients 18 yr+ (FLUBLOK)          Subjective:      Patient ID: Addi Peraza is a 60 y o  female  F/u multiple med issues  - pt doing well  R outer thigh pain improved greatly with PT  Tries to do her home exercises routinely  - pt states that her asthma is stable  Pt compliant with Advair bid and up to date with Allergist    - No Cp, palp, lightheadedness or other CV symptoms with or without exertion  - no GI complaints  Up to date with colonoscopy - due in 2020  - still with occasional CTS symptoms, usually with overuse  Does not feel that symptoms are bad enough to need further eval or treatment at this time  No hand weakness or muscle thinning noted  - no /Gyn issues  Up to date with mammo and Gyn (next appt in Feb)        The following portions of the patient's history were reviewed and updated as appropriate:   She  has a past medical history of Asthma    She   Patient Active Problem List    Diagnosis Date Noted    Chronic bilateral low back pain with left-sided sciatica 04/24/2018    Degenerative arthritis of thumb, right 09/14/2015    Carpal tunnel syndrome 02/19/2015    Osteopenia 08/21/2014    Asthma 02/07/2013    Allergic rhinitis 08/07/2012    Hyperlipidemia 08/07/2012     She  has a past surgical history that includes Tooth extraction and Toe Surgery (Right)  She  reports that she has never smoked  She has never used smokeless tobacco  She reports that she drinks alcohol  She reports that she does not use drugs  Current Outpatient Prescriptions   Medication Sig Dispense Refill    Calcium 600 MG tablet Take 2 tablets by mouth daily      [facility]S GLUCOSAMINE-CHONDROITIN PO Take by mouth      fluticasone-salmeterol (ADVAIR DISKUS) 100-50 mcg/dose Inhale      loratadine (CLARITIN) 10 mg tablet Take 1 tablet by mouth daily as needed      Multiple Vitamin (MULTIVITAMIN) capsule Take 1 capsule by mouth daily      rosuvastatin (CRESTOR) 20 MG tablet TAKE 1 TABLET DAILY 90 tablet 3    Vitamin D, Cholecalciferol, 400 units TABS Take by mouth      econazole nitrate 1 % cream Apply topically daily 85 g 1     No current facility-administered medications for this visit  She is allergic to penicillins       Review of Systems   Constitutional: Negative  HENT: Negative  Eyes: Negative  Respiratory: Negative  Cardiovascular: Negative  Gastrointestinal: Negative  Endocrine: Negative  Genitourinary: Negative  Musculoskeletal: Negative  Negative for arthralgias and gait problem  Skin: Positive for color change, rash and wound  Negative for pallor  Allergic/Immunologic: Negative  Neurological: Positive for numbness (intermittent, bilat medial nerve distribution in hands)  Negative for weakness  Hematological: Negative  Psychiatric/Behavioral: Negative  Objective:      /70   Pulse 72   Temp 98 °F (36 7 °C)   Ht 4' 9 5" (1 461 m)   Wt 76 3 kg (168 lb 3 2 oz)   BMI 35 77 kg/m²          Physical Exam   Constitutional: She is oriented to person, place, and time  She appears well-developed and well-nourished     HENT:   Head: Normocephalic and atraumatic  Right Ear: External ear normal    Left Ear: External ear normal    Nose: Nose normal    Mouth/Throat: Oropharynx is clear and moist  No oropharyngeal exudate  Eyes: Pupils are equal, round, and reactive to light  Conjunctivae and EOM are normal    Neck: Normal range of motion  Neck supple  No JVD present  No thyromegaly present  Cardiovascular: Normal rate, regular rhythm and intact distal pulses  No murmur heard  Pulmonary/Chest: Effort normal and breath sounds normal    Abdominal: Soft  She exhibits no distension  There is no tenderness  Lymphadenopathy:     She has no cervical adenopathy  Neurological: She is alert and oriented to person, place, and time  She has normal reflexes  No sensory deficit  She exhibits normal muscle tone  Coordination normal    Skin: Skin is warm and dry  She is not diaphoretic  Psychiatric: She has a normal mood and affect  Vitals reviewed

## 2019-01-23 PROCEDURE — G0124 SCREEN C/V THIN LAYER BY MD: HCPCS | Performed by: PATHOLOGY

## 2019-01-23 PROCEDURE — G0145 SCR C/V CYTO,THINLAYER,RESCR: HCPCS | Performed by: PATHOLOGY

## 2019-01-23 PROCEDURE — 87624 HPV HI-RISK TYP POOLED RSLT: CPT | Performed by: OBSTETRICS & GYNECOLOGY

## 2019-01-24 ENCOUNTER — LAB REQUISITION (OUTPATIENT)
Dept: LAB | Facility: HOSPITAL | Age: 59
End: 2019-01-24
Payer: COMMERCIAL

## 2019-01-24 DIAGNOSIS — Z11.51 ENCOUNTER FOR SCREENING FOR HUMAN PAPILLOMAVIRUS (HPV): ICD-10-CM

## 2019-01-24 DIAGNOSIS — Z01.419 ENCOUNTER FOR GYNECOLOGICAL EXAMINATION WITHOUT ABNORMAL FINDING: ICD-10-CM

## 2019-01-28 LAB
HPV HR 12 DNA CVX QL NAA+PROBE: NEGATIVE
HPV16 DNA CVX QL NAA+PROBE: NEGATIVE
HPV18 DNA CVX QL NAA+PROBE: NEGATIVE

## 2019-01-30 LAB
LAB AP GYN PRIMARY INTERPRETATION: NORMAL
Lab: NORMAL
PATH INTERP SPEC-IMP: NORMAL

## 2019-03-18 ENCOUNTER — PROCEDURE VISIT (OUTPATIENT)
Dept: OBGYN CLINIC | Facility: CLINIC | Age: 59
End: 2019-03-18
Payer: COMMERCIAL

## 2019-03-18 VITALS — SYSTOLIC BLOOD PRESSURE: 124 MMHG | BODY MASS INDEX: 35.3 KG/M2 | WEIGHT: 166 LBS | DIASTOLIC BLOOD PRESSURE: 82 MMHG

## 2019-03-18 DIAGNOSIS — R87.612 LGSIL ON PAP SMEAR OF CERVIX: Primary | ICD-10-CM

## 2019-03-18 PROCEDURE — 88305 TISSUE EXAM BY PATHOLOGIST: CPT | Performed by: PATHOLOGY

## 2019-03-18 PROCEDURE — 57456 ENDOCERV CURETTAGE W/SCOPE: CPT | Performed by: OBSTETRICS & GYNECOLOGY

## 2019-03-18 NOTE — PROGRESS NOTES
Pt is here for colposcopy        1/23/19 LGSIL    1/6/16 Normal Pap Negative HPV   8/9/18 Normal Mammo

## 2019-03-18 NOTE — PROGRESS NOTES
Colposcopy  Date/Time: 3/18/2019 8:56 AM  Performed by: Cathy Valentino MD  Authorized by: Cathy Valentino MD     Consent:     Consent obtained:  Verbal    Consent given by:  Patient    Procedural risks discussed:  Bleeding and infection    Patient questions answered: yes      Patient agrees, verbalizes understanding, and wants to proceed: yes      Educational handouts given: no      Instructions and paperwork completed: no    Pre-procedure:     Pre-procedure timeout performed: no      Prepped with: acetic acid    Indication:     Indication:  LSIL  Procedure:     Procedure: Colposcopy w/ cervical biopsy and ECC      Under satisfactory analgesia the patient was prepped and draped in the dorsal lithotomy position: yes      [place] speculum was placed in the vagina: yes      Under colposcopic examination the transition zone was seen in entirety: yes      Intracervical block was performed: no      Cervical biopsy performed with a cervical biopsy punch: no      Tampon inserted: no      Monsel's solution was applied: no      Biopsy(s): yes      Location:  ECC    Specimen to pathology: yes    Post-procedure:     Findings: Bleeding      Impression comment:  Chronic inflammation    Patient tolerance of procedure: Tolerated well, no immediate complications  Comments:      No lesions seen on cervix or endocervical canal  ECC performed  Impression: chronic inflammation     Pt states she has been with a new partner and started having relations prior to last pap  Unknown if he is HPV positive

## 2019-03-20 ENCOUNTER — TELEPHONE (OUTPATIENT)
Dept: OBGYN CLINIC | Facility: CLINIC | Age: 59
End: 2019-03-20

## 2019-03-20 NOTE — TELEPHONE ENCOUNTER
Left message for pt to call back about Normal Colpo results   ----- Message from Telma Damon MD sent at 3/20/2019  9:30 AM EDT -----  Normal EEC plan: repeat PAP in 6 months

## 2019-04-03 DIAGNOSIS — E78.00 HYPERCHOLESTEROLEMIA: ICD-10-CM

## 2019-04-03 RX ORDER — ROSUVASTATIN CALCIUM 20 MG/1
TABLET, COATED ORAL
Qty: 90 TABLET | Refills: 3 | Status: SHIPPED | OUTPATIENT
Start: 2019-04-03 | End: 2020-02-14 | Stop reason: SDUPTHER

## 2019-05-21 LAB
ALBUMIN SERPL-MCNC: 4 G/DL (ref 3.6–5.1)
ALBUMIN/GLOB SERPL: 1.7 (CALC) (ref 1–2.5)
ALP SERPL-CCNC: 87 U/L (ref 33–130)
ALT SERPL-CCNC: 15 U/L (ref 6–29)
AST SERPL-CCNC: 17 U/L (ref 10–35)
BILIRUB SERPL-MCNC: 0.9 MG/DL (ref 0.2–1.2)
BUN SERPL-MCNC: 14 MG/DL (ref 7–25)
BUN/CREAT SERPL: NORMAL (CALC) (ref 6–22)
CALCIUM SERPL-MCNC: 9.1 MG/DL (ref 8.6–10.4)
CHLORIDE SERPL-SCNC: 105 MMOL/L (ref 98–110)
CHOLEST SERPL-MCNC: 243 MG/DL
CHOLEST/HDLC SERPL: 4.3 (CALC)
CO2 SERPL-SCNC: 30 MMOL/L (ref 20–32)
CREAT SERPL-MCNC: 0.79 MG/DL (ref 0.5–1.05)
GLOBULIN SER CALC-MCNC: 2.4 G/DL (CALC) (ref 1.9–3.7)
GLUCOSE SERPL-MCNC: 94 MG/DL (ref 65–99)
HDLC SERPL-MCNC: 56 MG/DL
LDLC SERPL CALC-MCNC: 167 MG/DL (CALC)
NONHDLC SERPL-MCNC: 187 MG/DL (CALC)
POTASSIUM SERPL-SCNC: 3.9 MMOL/L (ref 3.5–5.3)
PROT SERPL-MCNC: 6.4 G/DL (ref 6.1–8.1)
SL AMB EGFR AFRICAN AMERICAN: 95 ML/MIN/1.73M2
SL AMB EGFR NON AFRICAN AMERICAN: 82 ML/MIN/1.73M2
SODIUM SERPL-SCNC: 139 MMOL/L (ref 135–146)
TRIGL SERPL-MCNC: 91 MG/DL

## 2019-07-29 ENCOUNTER — TELEPHONE (OUTPATIENT)
Dept: FAMILY MEDICINE CLINIC | Facility: CLINIC | Age: 59
End: 2019-07-29

## 2019-07-29 ENCOUNTER — OFFICE VISIT (OUTPATIENT)
Dept: FAMILY MEDICINE CLINIC | Facility: CLINIC | Age: 59
End: 2019-07-29
Payer: COMMERCIAL

## 2019-07-29 VITALS
HEIGHT: 58 IN | WEIGHT: 166.4 LBS | TEMPERATURE: 98.9 F | SYSTOLIC BLOOD PRESSURE: 120 MMHG | BODY MASS INDEX: 34.93 KG/M2 | HEART RATE: 76 BPM | DIASTOLIC BLOOD PRESSURE: 82 MMHG

## 2019-07-29 DIAGNOSIS — T63.444A BEE STING REACTION, UNDETERMINED INTENT, INITIAL ENCOUNTER: Primary | ICD-10-CM

## 2019-07-29 PROCEDURE — 3008F BODY MASS INDEX DOCD: CPT | Performed by: FAMILY MEDICINE

## 2019-07-29 PROCEDURE — 99213 OFFICE O/P EST LOW 20 MIN: CPT | Performed by: FAMILY MEDICINE

## 2019-07-29 RX ORDER — PREDNISONE 20 MG/1
TABLET ORAL
Qty: 9 TABLET | Refills: 0 | Status: SHIPPED | OUTPATIENT
Start: 2019-07-29 | End: 2019-08-13 | Stop reason: ALTCHOICE

## 2019-07-29 NOTE — TELEPHONE ENCOUNTER
Patient states she was stung by bees on Tuesday  2 on her R leg and 1 on her L arm  She states now she is developing a rash on her R leg and L arm where the marks are  She is asking to be seen by you today?

## 2019-07-29 NOTE — PROGRESS NOTES
Assessment/Plan:      Diagnoses and all orders for this visit:    Bee sting reaction, undetermined intent, initial encounter  Comments:  Start pred 20mg as directed  Cont tipical Bendaryl  Recheck 3 days if not improving - earlier if worse  Orders:  -     predniSONE 20 mg tablet; 2 tab po qd x 3d then 1 tab po qd x 3d then stop          Subjective:     Patient ID: Renetta Alejo is a 60 y o  female  24-year-old female suffered for bee stings to her extremities 6 days ago  Patient had 1 to the left forearm, 2 to the right medial thigh, and 1 to the left 3rd finger  Areas have a since become swollen and extremely pruritic  Patient denies any respiratory complaints, facial swelling or other systemic affects  Patient denies any change with topical Benadryl cream, ice and other conservative measures  Review of Systems   Constitutional: Negative  HENT: Negative  Respiratory: Negative  Cardiovascular: Negative  Musculoskeletal: Negative  Skin: Positive for rash  Objective:     Physical Exam   Constitutional: She appears well-developed and well-nourished  HENT:   Nose: Nose normal    Mouth/Throat: Oropharynx is clear and moist    Pulmonary/Chest: Effort normal and breath sounds normal    Skin:   Areas of erythematous inflammation slight central clearing on the left flexor forearm, and 2 on the right medial thigh  Small area without significant swelling over the volar aspect of the left proximal phalanx  No toxic striations or other significant findings noted

## 2019-08-13 ENCOUNTER — OFFICE VISIT (OUTPATIENT)
Dept: FAMILY MEDICINE CLINIC | Facility: CLINIC | Age: 59
End: 2019-08-13
Payer: COMMERCIAL

## 2019-08-13 VITALS
HEIGHT: 58 IN | BODY MASS INDEX: 34.43 KG/M2 | SYSTOLIC BLOOD PRESSURE: 118 MMHG | DIASTOLIC BLOOD PRESSURE: 80 MMHG | HEART RATE: 72 BPM | TEMPERATURE: 98.2 F | WEIGHT: 164 LBS

## 2019-08-13 DIAGNOSIS — T63.444D BEE STING REACTION, UNDETERMINED INTENT, SUBSEQUENT ENCOUNTER: ICD-10-CM

## 2019-08-13 DIAGNOSIS — G56.03 BILATERAL CARPAL TUNNEL SYNDROME: ICD-10-CM

## 2019-08-13 DIAGNOSIS — E78.2 MIXED HYPERLIPIDEMIA: ICD-10-CM

## 2019-08-13 DIAGNOSIS — J45.20 MILD INTERMITTENT ASTHMA WITHOUT COMPLICATION: Primary | ICD-10-CM

## 2019-08-13 PROBLEM — T63.441A BEE STING REACTION: Status: ACTIVE | Noted: 2019-08-13

## 2019-08-13 PROCEDURE — 1036F TOBACCO NON-USER: CPT | Performed by: FAMILY MEDICINE

## 2019-08-13 PROCEDURE — 99214 OFFICE O/P EST MOD 30 MIN: CPT | Performed by: FAMILY MEDICINE

## 2019-08-13 NOTE — ASSESSMENT & PLAN NOTE
Resolved  Strongly recommend that she discuss this with her allergist and undergo further testing  Patient to avoid bee stings for now  Recheck 6 months or p r n

## 2019-08-13 NOTE — ASSESSMENT & PLAN NOTE
LDL remains elevated at 167 despite Crestor 20 mg a day  Continue present medications  Encouraged ambulation, weight loss and dietary compliance  BMI Counseling: Body mass index is 34 87 kg/m²  Discussed the patient's BMI with her  The BMI is above average  BMI counseling and education was provided to the patient  Nutrition recommendations include moderation in carbohydrate intake, increasing intake of lean protein, reducing intake of saturated fat and trans fat and reducing intake of cholesterol  Exercise recommendations include exercising 3-5 times per week    Recheck 6m

## 2019-08-13 NOTE — ASSESSMENT & PLAN NOTE
Improved with use of carpal tunnel wrist braces  Still with symptoms with overuse  Patient wishes to continue to observe for now  She understands if symptoms should worsen she will need earlier follow-up    Otherwise recheck 6 months

## 2019-08-13 NOTE — PROGRESS NOTES
BMI Counseling: Body mass index is 34 87 kg/m²  Discussed the patient's BMI with her   The BMI {VB BMI Counselin}

## 2019-08-13 NOTE — PROGRESS NOTES
Assessment/Plan:    Carpal tunnel syndrome  Improved with use of carpal tunnel wrist braces  Still with symptoms with overuse  Patient wishes to continue to observe for now  She understands if symptoms should worsen she will need earlier follow-up  Otherwise recheck 6 months    Asthma  Well controlled on present treatment  Continue follow-up with allergist   Recheck 6 months     Hyperlipidemia  LDL remains elevated at 167 despite Crestor 20 mg a day  Continue present medications  Encouraged ambulation, weight loss and dietary compliance  BMI Counseling: Body mass index is 34 87 kg/m²  Discussed the patient's BMI with her  The BMI is above average  BMI counseling and education was provided to the patient  Nutrition recommendations include moderation in carbohydrate intake, increasing intake of lean protein, reducing intake of saturated fat and trans fat and reducing intake of cholesterol  Exercise recommendations include exercising 3-5 times per week  Recheck 6m      Bee sting reaction  Resolved  Strongly recommend that she discuss this with her allergist and undergo further testing  Patient to avoid bee stings for now  Recheck 6 months or p r n  Diagnoses and all orders for this visit:    Mild intermittent asthma without complication    Mixed hyperlipidemia  -     Comprehensive metabolic panel; Future  -     Lipid panel; Future  -     TSH, 3rd generation with Free T4 reflex; Future    Bilateral carpal tunnel syndrome    Bee sting reaction, undetermined intent, subsequent encounter          Subjective:      Patient ID: Ella Perkins is a 60 y o  female  F/u multiple med issues  - pt doing well  L forearm bee sting swelling has resolved without sequelae  Pt has an Allergist (Dr Krystle Lees)  Pt has not brought this up to him yet  - pt states that her asthma is stable  Pt compliant with Advair bid  - pt states that she is walking regularly and is compliant with her bursa stretches   No Cp, palp, lightheadedness or other CV symptoms with or without exertion  - no GI complaints  Up to date with colonoscopy - due in 2020  - pt has been compliant with her CTS braces which help   Still worsens with overuse  No weakness noted  - no /Gyn issues  Up to date with mammo (yesterday) and Gyn      The following portions of the patient's history were reviewed and updated as appropriate: She  has a past medical history of Asthma  She   Patient Active Problem List    Diagnosis Date Noted    Bee sting reaction 08/13/2019    Chronic bilateral low back pain with left-sided sciatica 04/24/2018    Degenerative arthritis of thumb, right 09/14/2015    Carpal tunnel syndrome 02/19/2015    Osteopenia 08/21/2014    Asthma 02/07/2013    Allergic rhinitis 08/07/2012    Hyperlipidemia 08/07/2012     She  has a past surgical history that includes Tooth extraction and Toe Surgery (Right)  She  reports that she has never smoked  She has never used smokeless tobacco  She reports that she drinks alcohol  She reports that she does not use drugs  Current Outpatient Medications   Medication Sig Dispense Refill    Calcium 600 MG tablet Take 2 tablets by mouth daily      [facility]S GLUCOSAMINE-CHONDROITIN PO Take by mouth      econazole nitrate 1 % cream Apply topically daily 85 g 1    fluticasone-salmeterol (ADVAIR DISKUS) 100-50 mcg/dose Inhale      loratadine (CLARITIN) 10 mg tablet Take 1 tablet by mouth daily as needed      Multiple Vitamin (MULTIVITAMIN) capsule Take 1 capsule by mouth daily      rosuvastatin (CRESTOR) 20 MG tablet TAKE 1 TABLET DAILY 90 tablet 3    Vitamin D, Cholecalciferol, 400 units TABS Take by mouth       No current facility-administered medications for this visit  She is allergic to penicillins       Review of Systems   Constitutional: Negative  HENT: Negative  Eyes: Negative  Respiratory: Negative  Cardiovascular: Negative  Gastrointestinal: Negative  Endocrine: Negative  Genitourinary: Negative  Musculoskeletal: Negative  Negative for arthralgias and gait problem  Skin: Negative  Negative for color change, pallor, rash and wound  Allergic/Immunologic: Negative  Neurological: Positive for numbness (intermittent, bilat medial nerve distribution in hands)  Negative for weakness  Hematological: Negative  Psychiatric/Behavioral: Negative  Objective:      /80   Pulse 72   Temp 98 2 °F (36 8 °C)   Ht 4' 9 5" (1 461 m)   Wt 74 4 kg (164 lb)   BMI 34 87 kg/m²          Physical Exam   Constitutional: She is oriented to person, place, and time  She appears well-developed and well-nourished  HENT:   Head: Normocephalic and atraumatic  Right Ear: External ear normal    Left Ear: External ear normal    Nose: Nose normal    Mouth/Throat: Oropharynx is clear and moist  No oropharyngeal exudate  Eyes: Pupils are equal, round, and reactive to light  Conjunctivae and EOM are normal    Neck: Normal range of motion  Neck supple  No JVD present  No thyromegaly present  Cardiovascular: Normal rate, regular rhythm and intact distal pulses  No murmur heard  Pulmonary/Chest: Effort normal and breath sounds normal    Abdominal: Soft  She exhibits no distension  There is no tenderness  Musculoskeletal:   No thenar/hypothenar wasting  Neg Tinnels bilat   Lymphadenopathy:     She has no cervical adenopathy  Neurological: She is alert and oriented to person, place, and time  She has normal reflexes  No sensory deficit  She exhibits normal muscle tone  Coordination normal    Skin: Skin is warm and dry  She is not diaphoretic  Psychiatric: She has a normal mood and affect  Vitals reviewed

## 2019-08-29 ENCOUNTER — TELEPHONE (OUTPATIENT)
Dept: FAMILY MEDICINE CLINIC | Facility: CLINIC | Age: 59
End: 2019-08-29

## 2019-08-29 DIAGNOSIS — L23.7 POISON IVY DERMATITIS: Primary | ICD-10-CM

## 2019-08-29 RX ORDER — METHYLPREDNISOLONE 4 MG/1
TABLET ORAL
Qty: 21 EACH | Refills: 0 | Status: SHIPPED | OUTPATIENT
Start: 2019-08-29 | End: 2020-02-14 | Stop reason: ALTCHOICE

## 2019-08-29 NOTE — TELEPHONE ENCOUNTER
She was recently treated for bee sting and finished med  Now something else stung her , she doesn't know what, she has a pen size hole and hand is swollen down to thumb  Pl adv

## 2019-08-29 NOTE — TELEPHONE ENCOUNTER
Her thumb is swollen or her hand? When was she stung? Is it getting worse  What has she used so far"?

## 2019-08-29 NOTE — TELEPHONE ENCOUNTER
I spoke with patient  She woke up Wednesday morning with poison ivy on her left hand and a very swollen left thumb  She stated it is getting worse, she has tried technu and ichthamol ointment  Please advise  Thank you

## 2019-09-09 ENCOUNTER — OFFICE VISIT (OUTPATIENT)
Dept: OBGYN CLINIC | Facility: CLINIC | Age: 59
End: 2019-09-09
Payer: COMMERCIAL

## 2019-09-09 VITALS
DIASTOLIC BLOOD PRESSURE: 80 MMHG | SYSTOLIC BLOOD PRESSURE: 122 MMHG | HEIGHT: 58 IN | BODY MASS INDEX: 34 KG/M2 | WEIGHT: 162 LBS

## 2019-09-09 DIAGNOSIS — R87.612 LOW GRADE SQUAMOUS INTRAEPITH LESION ON CYTOLOGIC SMEAR CERVIX (LGSIL): Primary | ICD-10-CM

## 2019-09-09 PROCEDURE — 88175 CYTOPATH C/V AUTO FLUID REDO: CPT | Performed by: OBSTETRICS & GYNECOLOGY

## 2019-09-09 PROCEDURE — 99213 OFFICE O/P EST LOW 20 MIN: CPT | Performed by: OBSTETRICS & GYNECOLOGY

## 2019-09-09 NOTE — PROGRESS NOTES
S/p colposcopy 3/19  Negative  See last visit  Pap 1/19 LSIL  Cervix no lesions  Pap smear done     Impression: s/p colpo for LSIL   Plan: check pap

## 2019-09-12 LAB
LAB AP GYN PRIMARY INTERPRETATION: NORMAL
Lab: NORMAL

## 2019-10-30 LAB
ALBUMIN SERPL-MCNC: 4.1 G/DL (ref 3.6–5.1)
ALBUMIN/GLOB SERPL: 1.7 (CALC) (ref 1–2.5)
ALP SERPL-CCNC: 81 U/L (ref 33–130)
ALT SERPL-CCNC: 17 U/L (ref 6–29)
AST SERPL-CCNC: 19 U/L (ref 10–35)
BILIRUB SERPL-MCNC: 1 MG/DL (ref 0.2–1.2)
BUN SERPL-MCNC: 13 MG/DL (ref 7–25)
BUN/CREAT SERPL: NORMAL (CALC) (ref 6–22)
CALCIUM SERPL-MCNC: 9.2 MG/DL (ref 8.6–10.4)
CHLORIDE SERPL-SCNC: 104 MMOL/L (ref 98–110)
CHOLEST SERPL-MCNC: 217 MG/DL
CHOLEST/HDLC SERPL: 3.6 (CALC)
CO2 SERPL-SCNC: 28 MMOL/L (ref 20–32)
CREAT SERPL-MCNC: 0.85 MG/DL (ref 0.5–0.99)
GLOBULIN SER CALC-MCNC: 2.4 G/DL (CALC) (ref 1.9–3.7)
GLUCOSE SERPL-MCNC: 91 MG/DL (ref 65–99)
HDLC SERPL-MCNC: 61 MG/DL
LDLC SERPL CALC-MCNC: 139 MG/DL (CALC)
NONHDLC SERPL-MCNC: 156 MG/DL (CALC)
POTASSIUM SERPL-SCNC: 3.9 MMOL/L (ref 3.5–5.3)
PROT SERPL-MCNC: 6.5 G/DL (ref 6.1–8.1)
SL AMB EGFR AFRICAN AMERICAN: 86 ML/MIN/1.73M2
SL AMB EGFR NON AFRICAN AMERICAN: 74 ML/MIN/1.73M2
SODIUM SERPL-SCNC: 139 MMOL/L (ref 135–146)
TRIGL SERPL-MCNC: 71 MG/DL
TSH SERPL-ACNC: 1.85 MIU/L (ref 0.4–4.5)

## 2020-02-06 ENCOUNTER — ANNUAL EXAM (OUTPATIENT)
Dept: OBGYN CLINIC | Facility: CLINIC | Age: 60
End: 2020-02-06
Payer: COMMERCIAL

## 2020-02-06 VITALS
HEIGHT: 59 IN | BODY MASS INDEX: 33.26 KG/M2 | DIASTOLIC BLOOD PRESSURE: 80 MMHG | WEIGHT: 165 LBS | SYSTOLIC BLOOD PRESSURE: 130 MMHG

## 2020-02-06 DIAGNOSIS — Z01.419 ENCOUNTER FOR ANNUAL ROUTINE GYNECOLOGICAL EXAMINATION: ICD-10-CM

## 2020-02-06 DIAGNOSIS — Z12.31 SCREENING MAMMOGRAM FOR HIGH-RISK PATIENT: Primary | ICD-10-CM

## 2020-02-06 DIAGNOSIS — R87.612 LGSIL ON PAP SMEAR OF CERVIX: ICD-10-CM

## 2020-02-06 PROCEDURE — S0612 ANNUAL GYNECOLOGICAL EXAMINA: HCPCS | Performed by: OBSTETRICS & GYNECOLOGY

## 2020-02-06 PROCEDURE — G0124 SCREEN C/V THIN LAYER BY MD: HCPCS | Performed by: PATHOLOGY

## 2020-02-06 PROCEDURE — G0145 SCR C/V CYTO,THINLAYER,RESCR: HCPCS | Performed by: PATHOLOGY

## 2020-02-06 NOTE — PROGRESS NOTES
Assessment/Plan:    Normal breast and gyn except vaginal atrophy    Plan healthy diet and exercise discussed    Emphasis on weight-bearing exercise for the prevention of osteoporosis    Patient has colonoscopy scheduled for March 2020    Rx for mammogram given    Check Pap smear    Subjective:      Patient ID: Candie Ambriz is a 60 y  o G0 female presents for yearly exam with no gyn or breast complaints  Patient denies any pelvic pain any vaginal bleeding any bowel or bladder issues  Last menstrual period 2013    History of osteopenia            Review of Systems   Constitutional: Negative  HENT: Negative  Eyes: Negative  Respiratory: Negative  Cardiovascular: Negative  Gastrointestinal: Negative  Endocrine: Negative  Musculoskeletal: Negative  Skin: Negative  Allergic/Immunologic: Negative  Neurological: Negative  Hematological: Negative  Psychiatric/Behavioral: Negative  All other systems reviewed and are negative  Objective:      /80   Ht 4' 11" (1 499 m)   Wt 74 8 kg (165 lb)   LMP  (LMP Unknown)   BMI 33 33 kg/m²          Physical Exam   Constitutional: She is oriented to person, place, and time  She appears well-developed and well-nourished  HENT:   Head: Normocephalic and atraumatic  Neck: Normal range of motion  Neck supple  No tracheal deviation present  No thyromegaly present  Cardiovascular: Normal rate, regular rhythm and normal heart sounds  Pulmonary/Chest: Effort normal and breath sounds normal  No stridor  No respiratory distress  She has no wheezes  She has no rales  She exhibits no tenderness  Right breast exhibits no inverted nipple, no mass, no nipple discharge, no skin change and no tenderness  Left breast exhibits no inverted nipple, no mass, no nipple discharge, no skin change and no tenderness  No breast swelling, tenderness, discharge or bleeding  Breasts are symmetrical    Abdominal: Soft   Bowel sounds are normal  She exhibits no distension and no mass  There is no tenderness  There is no rebound and no guarding  No hernia  Hernia confirmed negative in the right inguinal area and confirmed negative in the left inguinal area  Genitourinary: Vagina normal and uterus normal  Rectal exam shows no external hemorrhoid, no internal hemorrhoid, no fissure and no mass  No breast swelling, tenderness, discharge or bleeding  No labial fusion  There is no rash, tenderness, lesion or injury on the right labia  There is no rash, tenderness, lesion or injury on the left labia  Uterus is not deviated, not enlarged, not fixed and not tender  Cervix exhibits no motion tenderness, no discharge and no friability  Right adnexum displays no mass, no tenderness and no fullness  Left adnexum displays no mass, no tenderness and no fullness  No erythema, tenderness or bleeding in the vagina  No foreign body in the vagina  No signs of injury around the vagina  No vaginal discharge found  Genitourinary Comments: Normal urethra and [place]'s glands  No uterine prolapse cystocele or rectocele noted   Lymphadenopathy: No inguinal adenopathy noted on the right or left side  Neurological: She is alert and oriented to person, place, and time  Skin: Skin is warm and dry  Psychiatric: She has a normal mood and affect   Her behavior is normal  Judgment and thought content normal

## 2020-02-06 NOTE — PROGRESS NOTES
Patient is here for yearly exam  Patient has no complaints  She has no bleeding or pelvic pain  No breast concerns and B&B ok  Patient is due for a pap smear today  9/9/19 Normal Pap  1/23/19 LGSIL    1/6/16 Normal Pap, Negative HPV

## 2020-02-13 LAB
LAB AP GYN PRIMARY INTERPRETATION: ABNORMAL
Lab: ABNORMAL
PATH INTERP SPEC-IMP: ABNORMAL

## 2020-02-14 ENCOUNTER — OFFICE VISIT (OUTPATIENT)
Dept: FAMILY MEDICINE CLINIC | Facility: CLINIC | Age: 60
End: 2020-02-14
Payer: COMMERCIAL

## 2020-02-14 VITALS
OXYGEN SATURATION: 98 % | RESPIRATION RATE: 18 BRPM | TEMPERATURE: 97.8 F | SYSTOLIC BLOOD PRESSURE: 128 MMHG | HEIGHT: 59 IN | HEART RATE: 86 BPM | BODY MASS INDEX: 33.06 KG/M2 | WEIGHT: 164 LBS | DIASTOLIC BLOOD PRESSURE: 74 MMHG

## 2020-02-14 DIAGNOSIS — J45.20 MILD INTERMITTENT ASTHMA WITHOUT COMPLICATION: ICD-10-CM

## 2020-02-14 DIAGNOSIS — Z78.0 ASYMPTOMATIC POSTMENOPAUSAL STATE: ICD-10-CM

## 2020-02-14 DIAGNOSIS — E78.00 HYPERCHOLESTEROLEMIA: Primary | ICD-10-CM

## 2020-02-14 DIAGNOSIS — M85.80 OSTEOPENIA, UNSPECIFIED LOCATION: ICD-10-CM

## 2020-02-14 PROBLEM — T63.441A BEE STING REACTION: Status: RESOLVED | Noted: 2019-08-13 | Resolved: 2020-02-14

## 2020-02-14 PROBLEM — I10 ESSENTIAL HYPERTENSION: Status: ACTIVE | Noted: 2020-02-14

## 2020-02-14 PROCEDURE — 3074F SYST BP LT 130 MM HG: CPT | Performed by: FAMILY MEDICINE

## 2020-02-14 PROCEDURE — 3008F BODY MASS INDEX DOCD: CPT | Performed by: FAMILY MEDICINE

## 2020-02-14 PROCEDURE — 99214 OFFICE O/P EST MOD 30 MIN: CPT | Performed by: FAMILY MEDICINE

## 2020-02-14 PROCEDURE — 1036F TOBACCO NON-USER: CPT | Performed by: FAMILY MEDICINE

## 2020-02-14 PROCEDURE — 3078F DIAST BP <80 MM HG: CPT | Performed by: FAMILY MEDICINE

## 2020-02-14 RX ORDER — ROSUVASTATIN CALCIUM 20 MG/1
20 TABLET, COATED ORAL DAILY
Qty: 90 TABLET | Refills: 3 | Status: SHIPPED | OUTPATIENT
Start: 2020-02-14 | End: 2021-03-19 | Stop reason: SDUPTHER

## 2020-02-14 NOTE — PROGRESS NOTES
Assessment/Plan:    Asthma  Well controlled  Cont present meds  F/u with allergist  Recheck 6m    Osteopenia  Recheck dexa scan  Cont Vit D, Ca++ and weight bearing exercise  Recheck 6m    Hyperlipidemia  Check labs  Recheck 6m       Diagnoses and all orders for this visit:    Hypercholesterolemia  -     rosuvastatin (CRESTOR) 20 MG tablet; Take 1 tablet (20 mg total) by mouth daily  -     Comprehensive metabolic panel; Future  -     Lipid panel; Future    Mild intermittent asthma without complication  -     CBC and differential; Future    Osteopenia, unspecified location    Asymptomatic postmenopausal state  -     DXA bone density spine hip and pelvis; Future    Other orders  -     Cancel: CBC and differential; Future  -     Cancel: TSH, 3rd generation; Future          Subjective:      Patient ID: Anya Martin is a 60 y o  female  F/u multiple med issues  - pt states that she has been doing well  - back to walking regularly for exercise  Hips much better since she incorporated bursa stretches into her walking regimen  Pt denies CP, palpitations, lightheadedness or other CV symptoms with or without exertion  - pt states that her asthma is stable  Pt is up to date with allergist and is compliant with Advair bid  - pt denies any new GI complaints  Pt is due for colonoscopy this year  - Pt is up to date with mammo  and Gyn  She is due for Dexa      The following portions of the patient's history were reviewed and updated as appropriate:   She  has a past medical history of Asthma, Hyperlipidemia, Papanicolaou smear (01/23/2019), and Post-menopausal (2013)    She   Patient Active Problem List    Diagnosis Date Noted    Essential hypertension 02/14/2020    Chronic bilateral low back pain with left-sided sciatica 04/24/2018    Degenerative arthritis of thumb, right 09/14/2015    Carpal tunnel syndrome 02/19/2015    Osteopenia 08/21/2014    Asthma 02/07/2013    Allergic rhinitis 08/07/2012    Hyperlipidemia 08/07/2012     She  has a past surgical history that includes Tooth extraction; Toe Surgery (Right); Gynecologic cryosurgery; [place] tooth extraction; Colonoscopy (2008); and Mammo (historical) (Bilateral, 08/09/2018)  She  reports that she has never smoked  She has never used smokeless tobacco  She reports that she drinks alcohol  She reports that she does not use drugs  Current Outpatient Medications   Medication Sig Dispense Refill    Calcium 600 MG tablet Take 2 tablets by mouth daily      econazole nitrate 1 % cream Apply topically daily 85 g 1    fluticasone-salmeterol (ADVAIR DISKUS) 100-50 mcg/dose Inhale      loratadine (CLARITIN) 10 mg tablet Take 1 tablet by mouth daily as needed      Multiple Vitamin (MULTIVITAMIN) capsule Take 1 capsule by mouth daily      rosuvastatin (CRESTOR) 20 MG tablet Take 1 tablet (20 mg total) by mouth daily 90 tablet 3    Vitamin D, Cholecalciferol, 400 units TABS Take by mouth      [facility] GLUCOSAMINE-CHONDROITIN PO Take by mouth       No current facility-administered medications for this visit  She is allergic to dust mite extract; other; penicillins; and pollen extract       Review of Systems   Constitutional: Negative  HENT: Negative  Eyes: Negative  Respiratory: Negative  Cardiovascular: Negative  Gastrointestinal: Negative  Endocrine: Negative  Genitourinary: Negative  Musculoskeletal: Negative  Skin: Negative  Allergic/Immunologic: Negative  Neurological: Negative  Hematological: Negative  Psychiatric/Behavioral: Negative  Objective:      /74   Pulse 86   Temp 97 8 °F (36 6 °C)   Resp 18   Ht 4' 11" (1 499 m)   Wt 74 4 kg (164 lb)   LMP  (LMP Unknown)   SpO2 98%   BMI 33 12 kg/m²          Physical Exam   Constitutional: She is oriented to person, place, and time  She appears well-developed and well-nourished  HENT:   Head: Normocephalic and atraumatic     Right Ear: External ear normal    Left Ear: External ear normal    Nose: Nose normal    Mouth/Throat: Oropharynx is clear and moist  No oropharyngeal exudate  Eyes: Pupils are equal, round, and reactive to light  Conjunctivae and EOM are normal    Neck: Normal range of motion  Neck supple  No thyromegaly present  Cardiovascular: Normal rate, regular rhythm, normal heart sounds and intact distal pulses  No murmur heard  Pulmonary/Chest: Effort normal and breath sounds normal    Abdominal: Soft  She exhibits no distension and no mass  There is no tenderness  Musculoskeletal: Normal range of motion  She exhibits no edema or deformity  Lymphadenopathy:     She has no cervical adenopathy  Neurological: She is alert and oriented to person, place, and time  She has normal reflexes  No cranial nerve deficit  Coordination normal    Skin: Skin is warm and dry  She is not diaphoretic  Psychiatric: She has a normal mood and affect     PHQ-2 = 0

## 2020-02-24 ENCOUNTER — PROCEDURE VISIT (OUTPATIENT)
Dept: OBGYN CLINIC | Facility: CLINIC | Age: 60
End: 2020-02-24
Payer: COMMERCIAL

## 2020-02-24 VITALS
DIASTOLIC BLOOD PRESSURE: 82 MMHG | BODY MASS INDEX: 32.66 KG/M2 | SYSTOLIC BLOOD PRESSURE: 150 MMHG | WEIGHT: 162 LBS | HEIGHT: 59 IN

## 2020-02-24 DIAGNOSIS — N89.3 VAIN (VAGINAL INTRAEPITHELIAL NEOPLASIA): ICD-10-CM

## 2020-02-24 DIAGNOSIS — R87.612 LGSIL ON PAP SMEAR OF CERVIX: Primary | ICD-10-CM

## 2020-02-24 PROCEDURE — 88305 TISSUE EXAM BY PATHOLOGIST: CPT | Performed by: PATHOLOGY

## 2020-02-24 PROCEDURE — 3008F BODY MASS INDEX DOCD: CPT | Performed by: OBSTETRICS & GYNECOLOGY

## 2020-02-24 PROCEDURE — 57421 EXAM/BIOPSY OF VAG W/SCOPE: CPT | Performed by: OBSTETRICS & GYNECOLOGY

## 2020-02-24 PROCEDURE — 3077F SYST BP >= 140 MM HG: CPT | Performed by: OBSTETRICS & GYNECOLOGY

## 2020-02-24 PROCEDURE — 1036F TOBACCO NON-USER: CPT | Performed by: OBSTETRICS & GYNECOLOGY

## 2020-02-24 PROCEDURE — 3079F DIAST BP 80-89 MM HG: CPT | Performed by: OBSTETRICS & GYNECOLOGY

## 2020-02-24 NOTE — PROGRESS NOTES
Colposcopy  Date/Time: 2/24/2020 11:24 AM  Performed by: Luis Antonio Carrillo MD  Authorized by: Luis Antonio Carrillo MD     Consent:     Consent obtained:  Written    Consent given by:  Patient    Procedural risks discussed:  Bleeding    Patient questions answered: yes      Patient agrees, verbalizes understanding, and wants to proceed: yes      Educational handouts given: no      Instructions and paperwork completed: no    Pre-procedure:     Pre-procedure timeout performed: yes      Prepped with: acetic acid    Indication:     Indication:  LSIL  Procedure:     Procedure: Colposcopy of vagina w/ biopsy      Tampon inserted: no      Biopsy(s): yes      Location:  ECC and left vaginl wall    Specimen to pathology: yes    Post-procedure:     Findings: Bleeding      Impression comment:  VAIN 1  Comments:       Patient is sexually active with 1 partner () who presents for repeat colposcopy for low-grade EDVIN on Pap smear  Patient had a low-grade EDVIN January 2019 for negative HPV followed by a normal colposcopy  Today's findings showed flat aceto-white changes on the left vaginal wall towards the cervix  No cervical lesions were seen  No endocervical lesions were seen  Biopsies were taken from the left lateral wall of the vagina and the ECC      Impression:VAIN    Plan:  Check biopsy, add 2000 units of vitamin-D 3 daily

## 2020-03-05 ENCOUNTER — TELEPHONE (OUTPATIENT)
Dept: HEMATOLOGY ONCOLOGY | Facility: CLINIC | Age: 60
End: 2020-03-05

## 2020-03-05 NOTE — TELEPHONE ENCOUNTER
New Patient Encounter    New Patient Intake Form   Patient Details:  Pranav Small  1960  [id number]    Background Information:  [address] starts by opening a telephone encounter and gathering the following information   Who is calling to schedule? If not self, relationship to patient? self   Referring Provider Dr Jerica Rodriguez   What is the diagnosis? High grade squamous intraepithelial lesion     Is this diagnosis confirmed? Yes   When was the diagnosis? 03/2020   Is there a confirmed diagnosis from a biopsy/tissue reviewed by pathology? yes   Is patient aware of diagnosis? Yes   Is there a personal history of cancer and what kind? no   Is there a family history of cancer and what kind? Mother had colon cancer, grandmother had precancerous tumor   Reason for visit? New Diagnosis   Have you had any imaging or labs done? If so: when, where? Yes  Labs, Imaging at [address] [place] [address] [facility] records in [facility]? yes   Was the patient told to bring a disk? no   Does the patient smoke or Vape? no   If yes, how many packs or cartridges per day? n/a   Scheduling Information:   Preferred Prospect: [facility]     Requesting Specific Provider? Dr Luana Richmond   Are there any dates/time the patient cannot be seen? Going on vacation 3/14-3/21, colonoscopy on 3/30   Miscellaneous: n/a   After completing the above information, please route to Financial Counselor and the appropriate Nurse Navigator for review

## 2020-03-30 ENCOUNTER — TELEPHONE (OUTPATIENT)
Dept: HEMATOLOGY ONCOLOGY | Facility: CLINIC | Age: 60
End: 2020-03-30

## 2020-03-30 NOTE — TELEPHONE ENCOUNTER
Patient called to confirm their appointment  Appointment confirmed for Dr Surya De Souza              Appointment date and time: 4-2-20 @ 1:15pm               [place] location:  [facility]              Patient verbalized understanding of above

## 2020-04-01 ENCOUNTER — TELEPHONE (OUTPATIENT)
Dept: GYNECOLOGIC ONCOLOGY | Facility: CLINIC | Age: 60
End: 2020-04-01

## 2020-04-02 ENCOUNTER — CONSULT (OUTPATIENT)
Dept: GYNECOLOGIC ONCOLOGY | Facility: CLINIC | Age: 60
End: 2020-04-02
Payer: COMMERCIAL

## 2020-04-02 VITALS
HEART RATE: 80 BPM | SYSTOLIC BLOOD PRESSURE: 146 MMHG | TEMPERATURE: 98.4 F | BODY MASS INDEX: 32.25 KG/M2 | HEIGHT: 59 IN | DIASTOLIC BLOOD PRESSURE: 82 MMHG | WEIGHT: 160 LBS

## 2020-04-02 DIAGNOSIS — N87.0 DYSPLASIA OF CERVIX, LOW GRADE (CIN 1): Primary | ICD-10-CM

## 2020-04-02 DIAGNOSIS — N89.3 VAIN (VAGINAL INTRAEPITHELIAL NEOPLASIA): ICD-10-CM

## 2020-04-02 DIAGNOSIS — R87.612 LGSIL ON PAP SMEAR OF CERVIX: ICD-10-CM

## 2020-04-02 DIAGNOSIS — N89.1 VAGINAL INTRAEPITHELIAL NEOPLASIA GRADE 2: ICD-10-CM

## 2020-04-02 PROCEDURE — 99243 OFF/OP CNSLTJ NEW/EST LOW 30: CPT | Performed by: OBSTETRICS & GYNECOLOGY

## 2020-04-02 RX ORDER — SODIUM CHLORIDE, SODIUM LACTATE, POTASSIUM CHLORIDE, CALCIUM CHLORIDE 600; 310; 30; 20 MG/100ML; MG/100ML; MG/100ML; MG/100ML
125 INJECTION, SOLUTION INTRAVENOUS CONTINUOUS
Status: CANCELLED | OUTPATIENT
Start: 2020-04-02

## 2020-05-12 ENCOUNTER — HOSPITAL ENCOUNTER (OUTPATIENT)
Dept: RADIOLOGY | Age: 60
Discharge: HOME | End: 2020-05-12
Payer: COMMERCIAL

## 2020-05-12 DIAGNOSIS — Z78.0 ASYMPTOMATIC POSTMENOPAUSAL STATE: ICD-10-CM

## 2020-05-12 PROCEDURE — 77080 DXA BONE DENSITY AXIAL: CPT

## 2020-05-14 LAB
ALBUMIN SERPL-MCNC: 4.1 G/DL (ref 3.6–5.1)
ALBUMIN/GLOB SERPL: 2 (CALC) (ref 1–2.5)
ALP SERPL-CCNC: 85 U/L (ref 37–153)
ALT SERPL-CCNC: 13 U/L (ref 6–29)
AST SERPL-CCNC: 17 U/L (ref 10–35)
BASOPHILS # BLD AUTO: 38 CELLS/UL (ref 0–200)
BASOPHILS NFR BLD AUTO: 0.6 %
BILIRUB SERPL-MCNC: 1 MG/DL (ref 0.2–1.2)
BUN SERPL-MCNC: 13 MG/DL (ref 7–25)
BUN/CREAT SERPL: NORMAL (CALC) (ref 6–22)
CALCIUM SERPL-MCNC: 9 MG/DL (ref 8.6–10.4)
CHLORIDE SERPL-SCNC: 105 MMOL/L (ref 98–110)
CHOLEST SERPL-MCNC: 203 MG/DL
CHOLEST/HDLC SERPL: 3.7 (CALC)
CO2 SERPL-SCNC: 29 MMOL/L (ref 20–32)
CREAT SERPL-MCNC: 0.81 MG/DL (ref 0.5–0.99)
EOSINOPHIL # BLD AUTO: 151 CELLS/UL (ref 15–500)
EOSINOPHIL NFR BLD AUTO: 2.4 %
ERYTHROCYTE [DISTWIDTH] IN BLOOD BY AUTOMATED COUNT: 13.1 % (ref 11–15)
GLOBULIN SER CALC-MCNC: 2.1 G/DL (CALC) (ref 1.9–3.7)
GLUCOSE SERPL-MCNC: 93 MG/DL (ref 65–99)
HCT VFR BLD AUTO: 41.3 % (ref 35–45)
HDLC SERPL-MCNC: 55 MG/DL
HGB BLD-MCNC: 13.4 G/DL (ref 11.7–15.5)
LDLC SERPL CALC-MCNC: 132 MG/DL (CALC)
LYMPHOCYTES # BLD AUTO: 2098 CELLS/UL (ref 850–3900)
LYMPHOCYTES NFR BLD AUTO: 33.3 %
MCH RBC QN AUTO: 27.5 PG (ref 27–33)
MCHC RBC AUTO-ENTMCNC: 32.4 G/DL (ref 32–36)
MCV RBC AUTO: 84.6 FL (ref 80–100)
MONOCYTES # BLD AUTO: 536 CELLS/UL (ref 200–950)
MONOCYTES NFR BLD AUTO: 8.5 %
NEUTROPHILS # BLD AUTO: 3478 CELLS/UL (ref 1500–7800)
NEUTROPHILS NFR BLD AUTO: 55.2 %
NONHDLC SERPL-MCNC: 148 MG/DL (CALC)
PLATELET # BLD AUTO: 259 THOUSAND/UL (ref 140–400)
PMV BLD REES-ECKER: 10.5 FL (ref 7.5–12.5)
POTASSIUM SERPL-SCNC: 4.2 MMOL/L (ref 3.5–5.3)
PROT SERPL-MCNC: 6.2 G/DL (ref 6.1–8.1)
RBC # BLD AUTO: 4.88 MILLION/UL (ref 3.8–5.1)
SL AMB EGFR AFRICAN AMERICAN: 91 ML/MIN/1.73M2
SL AMB EGFR NON AFRICAN AMERICAN: 79 ML/MIN/1.73M2
SODIUM SERPL-SCNC: 140 MMOL/L (ref 135–146)
TRIGL SERPL-MCNC: 66 MG/DL
TSH SERPL-ACNC: 2.34 MIU/L (ref 0.4–4.5)
WBC # BLD AUTO: 6.3 THOUSAND/UL (ref 3.8–10.8)

## 2020-06-24 ENCOUNTER — TRANSCRIBE ORDERS (OUTPATIENT)
Dept: LAB | Facility: CLINIC | Age: 60
End: 2020-06-24

## 2020-06-24 ENCOUNTER — OFFICE VISIT (OUTPATIENT)
Dept: LAB | Facility: CLINIC | Age: 60
End: 2020-06-24
Payer: COMMERCIAL

## 2020-06-24 DIAGNOSIS — N89.1 VAGINAL INTRAEPITHELIAL NEOPLASIA GRADE 2: ICD-10-CM

## 2020-06-24 PROCEDURE — 93005 ELECTROCARDIOGRAM TRACING: CPT

## 2020-06-25 LAB
ATRIAL RATE: 78 BPM
BASOPHILS # BLD AUTO: 41 CELLS/UL (ref 0–200)
BASOPHILS NFR BLD AUTO: 0.6 %
BUN SERPL-MCNC: 14 MG/DL (ref 7–25)
BUN/CREAT SERPL: NORMAL (CALC) (ref 6–22)
CALCIUM SERPL-MCNC: 9.2 MG/DL (ref 8.6–10.4)
CHLORIDE SERPL-SCNC: 106 MMOL/L (ref 98–110)
CO2 SERPL-SCNC: 26 MMOL/L (ref 20–32)
CREAT SERPL-MCNC: 0.83 MG/DL (ref 0.5–0.99)
EOSINOPHIL # BLD AUTO: 159 CELLS/UL (ref 15–500)
EOSINOPHIL NFR BLD AUTO: 2.3 %
ERYTHROCYTE [DISTWIDTH] IN BLOOD BY AUTOMATED COUNT: 13.5 % (ref 11–15)
GLUCOSE SERPL-MCNC: 96 MG/DL (ref 65–99)
HCT VFR BLD AUTO: 39.1 % (ref 35–45)
HGB BLD-MCNC: 12.7 G/DL (ref 11.7–15.5)
LYMPHOCYTES # BLD AUTO: 2381 CELLS/UL (ref 850–3900)
LYMPHOCYTES NFR BLD AUTO: 34.5 %
MCH RBC QN AUTO: 27.3 PG (ref 27–33)
MCHC RBC AUTO-ENTMCNC: 32.5 G/DL (ref 32–36)
MCV RBC AUTO: 84.1 FL (ref 80–100)
MONOCYTES # BLD AUTO: 511 CELLS/UL (ref 200–950)
MONOCYTES NFR BLD AUTO: 7.4 %
NEUTROPHILS # BLD AUTO: 3809 CELLS/UL (ref 1500–7800)
NEUTROPHILS NFR BLD AUTO: 55.2 %
P AXIS: 52 DEGREES
PLATELET # BLD AUTO: 279 THOUSAND/UL (ref 140–400)
PMV BLD REES-ECKER: 10.3 FL (ref 7.5–12.5)
POTASSIUM SERPL-SCNC: 4 MMOL/L (ref 3.5–5.3)
PR INTERVAL: 126 MS
QRS AXIS: 13 DEGREES
QRSD INTERVAL: 98 MS
QT INTERVAL: 392 MS
QTC INTERVAL: 446 MS
RBC # BLD AUTO: 4.65 MILLION/UL (ref 3.8–5.1)
SL AMB EGFR AFRICAN AMERICAN: 89 ML/MIN/1.73M2
SL AMB EGFR NON AFRICAN AMERICAN: 77 ML/MIN/1.73M2
SODIUM SERPL-SCNC: 141 MMOL/L (ref 135–146)
T WAVE AXIS: 51 DEGREES
VENTRICULAR RATE: 78 BPM
WBC # BLD AUTO: 6.9 THOUSAND/UL (ref 3.8–10.8)

## 2020-06-25 PROCEDURE — 93010 ELECTROCARDIOGRAM REPORT: CPT | Performed by: INTERNAL MEDICINE

## 2020-07-03 DIAGNOSIS — Z11.59 SCREENING FOR VIRAL DISEASE: ICD-10-CM

## 2020-07-03 PROCEDURE — U0003 INFECTIOUS AGENT DETECTION BY NUCLEIC ACID (DNA OR RNA); SEVERE ACUTE RESPIRATORY SYNDROME CORONAVIRUS 2 (SARS-COV-2) (CORONAVIRUS DISEASE [COVID-19]), AMPLIFIED PROBE TECHNIQUE, MAKING USE OF HIGH THROUGHPUT TECHNOLOGIES AS DESCRIBED BY CMS-2020-01-R: HCPCS | Performed by: OBSTETRICS & GYNECOLOGY

## 2020-07-07 NOTE — PRE-PROCEDURE INSTRUCTIONS
Pre-Surgery Instructions:   Medication Instructions    Calcium 600 MG tablet Patient was instructed by Physician and understands   fluticasone-salmeterol (ADVAIR DISKUS) 100-50 mcg/dose Instructed patient per Anesthesia Guidelines   Multiple Vitamin (MULTIVITAMIN) capsule Patient was instructed by Physician and understands   rosuvastatin (CRESTOR) 20 MG tablet Instructed patient per Anesthesia Guidelines   Vitamin D, Cholecalciferol, 400 units TABS Patient was instructed by Physician and understands  Pre op and bathing instructions reviewed  Pt will use antibacterial soap

## 2020-07-08 ENCOUNTER — ANESTHESIA EVENT (OUTPATIENT)
Dept: PERIOP | Facility: HOSPITAL | Age: 60
End: 2020-07-08
Payer: COMMERCIAL

## 2020-07-08 LAB — SARS-COV-2 RNA SPEC QL NAA+PROBE: NOT DETECTED

## 2020-07-09 ENCOUNTER — ANESTHESIA (OUTPATIENT)
Dept: PERIOP | Facility: HOSPITAL | Age: 60
End: 2020-07-09
Payer: COMMERCIAL

## 2020-07-09 ENCOUNTER — HOSPITAL ENCOUNTER (OUTPATIENT)
Facility: HOSPITAL | Age: 60
Setting detail: OUTPATIENT SURGERY
Discharge: HOME | End: 2020-07-09
Attending: OBSTETRICS & GYNECOLOGY | Admitting: OBSTETRICS & GYNECOLOGY
Payer: COMMERCIAL

## 2020-07-09 VITALS
RESPIRATION RATE: 18 BRPM | DIASTOLIC BLOOD PRESSURE: 65 MMHG | HEIGHT: 59 IN | TEMPERATURE: 97 F | BODY MASS INDEX: 32.25 KG/M2 | WEIGHT: 160 LBS | OXYGEN SATURATION: 97 % | SYSTOLIC BLOOD PRESSURE: 127 MMHG | HEART RATE: 82 BPM

## 2020-07-09 DIAGNOSIS — Z11.59 SCREENING FOR VIRAL DISEASE: Primary | ICD-10-CM

## 2020-07-09 DIAGNOSIS — N89.1 VAGINAL INTRAEPITHELIAL NEOPLASIA GRADE 2: ICD-10-CM

## 2020-07-09 DIAGNOSIS — N87.0 DYSPLASIA OF CERVIX, LOW GRADE (CIN 1): ICD-10-CM

## 2020-07-09 PROCEDURE — 88307 TISSUE EXAM BY PATHOLOGIST: CPT | Performed by: PATHOLOGY

## 2020-07-09 PROCEDURE — 57065 DESTRUCTION VAG LESION XTNSV: CPT | Performed by: OBSTETRICS & GYNECOLOGY

## 2020-07-09 PROCEDURE — 57522 CONIZATION OF CERVIX: CPT | Performed by: OBSTETRICS & GYNECOLOGY

## 2020-07-09 RX ORDER — ONDANSETRON 2 MG/ML
INJECTION INTRAMUSCULAR; INTRAVENOUS AS NEEDED
Status: DISCONTINUED | OUTPATIENT
Start: 2020-07-09 | End: 2020-07-09 | Stop reason: SURG

## 2020-07-09 RX ORDER — HYDROMORPHONE HCL/PF 1 MG/ML
0.2 SYRINGE (ML) INJECTION
Status: DISCONTINUED | OUTPATIENT
Start: 2020-07-09 | End: 2020-07-09 | Stop reason: HOSPADM

## 2020-07-09 RX ORDER — ACETAMINOPHEN 160 MG/5ML
650 SOLUTION ORAL EVERY 4 HOURS PRN
Status: DISCONTINUED | OUTPATIENT
Start: 2020-07-09 | End: 2020-07-09 | Stop reason: HOSPADM

## 2020-07-09 RX ORDER — GLYCOPYRROLATE 0.2 MG/ML
INJECTION INTRAMUSCULAR; INTRAVENOUS AS NEEDED
Status: DISCONTINUED | OUTPATIENT
Start: 2020-07-09 | End: 2020-07-09 | Stop reason: SURG

## 2020-07-09 RX ORDER — MAGNESIUM HYDROXIDE 1200 MG/15ML
LIQUID ORAL AS NEEDED
Status: DISCONTINUED | OUTPATIENT
Start: 2020-07-09 | End: 2020-07-09 | Stop reason: HOSPADM

## 2020-07-09 RX ORDER — MIDAZOLAM HYDROCHLORIDE 2 MG/2ML
INJECTION, SOLUTION INTRAMUSCULAR; INTRAVENOUS AS NEEDED
Status: DISCONTINUED | OUTPATIENT
Start: 2020-07-09 | End: 2020-07-09 | Stop reason: SURG

## 2020-07-09 RX ORDER — SODIUM CHLORIDE, SODIUM LACTATE, POTASSIUM CHLORIDE, CALCIUM CHLORIDE 600; 310; 30; 20 MG/100ML; MG/100ML; MG/100ML; MG/100ML
125 INJECTION, SOLUTION INTRAVENOUS CONTINUOUS
Status: DISCONTINUED | OUTPATIENT
Start: 2020-07-09 | End: 2020-07-09 | Stop reason: SDUPTHER

## 2020-07-09 RX ORDER — FENTANYL CITRATE 50 UG/ML
INJECTION, SOLUTION INTRAMUSCULAR; INTRAVENOUS AS NEEDED
Status: DISCONTINUED | OUTPATIENT
Start: 2020-07-09 | End: 2020-07-09 | Stop reason: SURG

## 2020-07-09 RX ORDER — ACETIC ACID 5 %
LIQUID (ML) MISCELLANEOUS AS NEEDED
Status: DISCONTINUED | OUTPATIENT
Start: 2020-07-09 | End: 2020-07-09 | Stop reason: HOSPADM

## 2020-07-09 RX ORDER — LIDOCAINE HYDROCHLORIDE 10 MG/ML
INJECTION, SOLUTION EPIDURAL; INFILTRATION; INTRACAUDAL; PERINEURAL AS NEEDED
Status: DISCONTINUED | OUTPATIENT
Start: 2020-07-09 | End: 2020-07-09 | Stop reason: SURG

## 2020-07-09 RX ORDER — PROPOFOL 10 MG/ML
INJECTION, EMULSION INTRAVENOUS AS NEEDED
Status: DISCONTINUED | OUTPATIENT
Start: 2020-07-09 | End: 2020-07-09 | Stop reason: SURG

## 2020-07-09 RX ORDER — FENTANYL CITRATE/PF 50 MCG/ML
25 SYRINGE (ML) INJECTION
Status: DISCONTINUED | OUTPATIENT
Start: 2020-07-09 | End: 2020-07-09 | Stop reason: HOSPADM

## 2020-07-09 RX ORDER — EPHEDRINE SULFATE 50 MG/ML
INJECTION INTRAVENOUS AS NEEDED
Status: DISCONTINUED | OUTPATIENT
Start: 2020-07-09 | End: 2020-07-09 | Stop reason: SURG

## 2020-07-09 RX ORDER — SODIUM CHLORIDE, SODIUM LACTATE, POTASSIUM CHLORIDE, CALCIUM CHLORIDE 600; 310; 30; 20 MG/100ML; MG/100ML; MG/100ML; MG/100ML
125 INJECTION, SOLUTION INTRAVENOUS CONTINUOUS
Status: DISCONTINUED | OUTPATIENT
Start: 2020-07-09 | End: 2020-07-09 | Stop reason: HOSPADM

## 2020-07-09 RX ORDER — DEXAMETHASONE SODIUM PHOSPHATE 10 MG/ML
INJECTION, SOLUTION INTRAMUSCULAR; INTRAVENOUS AS NEEDED
Status: DISCONTINUED | OUTPATIENT
Start: 2020-07-09 | End: 2020-07-09 | Stop reason: SURG

## 2020-07-09 RX ORDER — ONDANSETRON 2 MG/ML
4 INJECTION INTRAMUSCULAR; INTRAVENOUS ONCE AS NEEDED
Status: DISCONTINUED | OUTPATIENT
Start: 2020-07-09 | End: 2020-07-09 | Stop reason: HOSPADM

## 2020-07-09 RX ADMIN — DEXAMETHASONE SODIUM PHOSPHATE 4 MG: 10 INJECTION, SOLUTION INTRAMUSCULAR; INTRAVENOUS at 13:33

## 2020-07-09 RX ADMIN — MIDAZOLAM HYDROCHLORIDE 2 MG: 1 INJECTION, SOLUTION INTRAMUSCULAR; INTRAVENOUS at 13:23

## 2020-07-09 RX ADMIN — FENTANYL CITRATE 50 MCG: 50 INJECTION INTRAMUSCULAR; INTRAVENOUS at 13:30

## 2020-07-09 RX ADMIN — SODIUM CHLORIDE, SODIUM LACTATE, POTASSIUM CHLORIDE, AND CALCIUM CHLORIDE: .6; .31; .03; .02 INJECTION, SOLUTION INTRAVENOUS at 13:23

## 2020-07-09 RX ADMIN — EPHEDRINE SULFATE 5 MG: 50 INJECTION, SOLUTION INTRAVENOUS at 13:53

## 2020-07-09 RX ADMIN — ONDANSETRON 4 MG: 2 INJECTION INTRAMUSCULAR; INTRAVENOUS at 13:33

## 2020-07-09 RX ADMIN — GLYCOPYRROLATE 0.2 MG: 0.2 INJECTION, SOLUTION INTRAMUSCULAR; INTRAVENOUS at 13:33

## 2020-07-09 RX ADMIN — PROPOFOL 120 MG: 10 INJECTION, EMULSION INTRAVENOUS at 13:28

## 2020-07-09 RX ADMIN — LIDOCAINE HYDROCHLORIDE 50 MG: 10 INJECTION, SOLUTION EPIDURAL; INFILTRATION; INTRACAUDAL; PERINEURAL at 13:28

## 2020-07-09 RX ADMIN — EPHEDRINE SULFATE 5 MG: 50 INJECTION, SOLUTION INTRAVENOUS at 13:56

## 2020-07-09 NOTE — ANESTHESIA PREPROCEDURE EVALUATION
Review of Systems/Medical History  Patient summary reviewed    No history of anesthetic complications     Cardiovascular  Exercise tolerance (METS): >4,  Hyperlipidemia, No angina ,    Pulmonary  Asthma , well controlled/ stable Asthma type of rescue: daily inhaler,        GI/Hepatic    No GERD ,        Negative  ROS        Endo/Other  Negative endo/other ROS      GYN       Hematology   Musculoskeletal  Back pain ,        Neurology  No seizures ,  No CVA ,    Psychology           Physical Exam    Airway    Mallampati score: II  TM Distance: <3 FB  Neck ROM: full     Dental   No notable dental hx     Cardiovascular      Pulmonary      Other Findings        Anesthesia Plan  ASA Score- 2     Anesthesia Type- general with ASA Monitors  Additional Monitors:   Airway Plan: LMA  Plan Factors-    Induction- intravenous  Postoperative Plan-     Informed Consent- Anesthetic plan and risks discussed with patient  I personally reviewed this patient with the CRNA  Discussed and agreed on the Anesthesia Plan with the CRNA  Shane Navas

## 2020-07-09 NOTE — OP NOTE
OPERATIVE REPORT  PATIENT NAME: Charisma Han    :  1960  MRN: [id number]  Pt Location: AN OR ROOM 02    SURGERY DATE: 2020    Surgeon(s) and Role:     * Lynsey Slaughter MD - Primary     * Karen Valdovinos MD - Assisting    Preop Diagnosis:  Dysplasia of cervix, low grade (ANDREAS 1) [N87 0]  Vaginal intraepithelial neoplasia grade 2 [N89 1]    Post-Op Diagnosis Codes: * Dysplasia of cervix, low grade (ANDREAS 1) [N87 0]     * Vaginal intraepithelial neoplasia grade 2 [N89 1]    Procedure(s) (LRB):  CO2 LASER VAGINA (N/A)  LEEP (N/A)    Specimen(s):  ID Type Source Tests Collected by Time Destination   1 : LEEP Tissue Cervix, Endocervical TISSUE EXAM Lynsey Slaughter MD 2020 [id number]        Estimated Blood Loss:   Minimal    Drains:  * No LDAs found *    Anesthesia Type:   General    Operative Indications:  Dysplasia of cervix, low grade (ANDREAS 1) [N87 0]  Vaginal intraepithelial neoplasia grade 2 [N801]  65-year-old with biopsy-proven VAIN2, ANDREAS 1 presents for CO2 laser of vaginal lesions, LEEP    Operative Findings:  1  Exam under anesthesia revealed a grossly normal cervix  Upon application of ascetic acid, there were findings consistent with vaginal dysplasia present at the bilateral lateral fornices  Complications:   None    Procedure and Technique:  After informed consent was obtained, the patient was taken to the operating room where general anesthesia was administered via LMA  She was then prepped and draped in normal sterile fashion in the dorsal lithotomy position  Exam under anesthesia was performed findings noted as above  A coated speculum was placed in patient's vagina  The cast was applied with findings noted as above  A LEEP was performed using a 10 x 10 mm loop at the cervix measured only approximately 1 5 cm in diameter  A single pass across the ectocervix was a made followed by 2nd pass at the posterior lip    The CO2 laser was then used at 15 w continuous setting to ablate the dysplasia identified at the bilateral lateral vaginal fornices  The depth was approximately 1 mm  The laser was used for 248 seconds  Hemostasis was spontaneous and adequate  She was then awakened and transferred to the recovery room in stable condition  All instrument counts correct x2 for procedure  No complications    Estimated blood loss is minimal      I was present for the entire procedure    Patient Disposition:  PACU     SIGNATURE: Donald Lang MD  DATE: July 9, 2020  TIME: 2:14 PM

## 2020-07-09 NOTE — H&P
Assessment/Plan:    79-year-old with biopsy-proven VAIN 2, possible ANDREAS 1 by ECC with localized vaginal dysplasia to the left apex  Her performance status is 0   1  Plan for CO2 laser vaporization of vaginal lesions and LEEP procedure  CHIEF COMPLAINT:  Here for surgery        Patient ID: Meliton Homans is a 60 y o  female  79-year-old presents for surgery  She has severe vaginal dysplasia as well as low-grade cervical dysplasia  There has been no interval change in her medications or medical history since her last visit in the office  She is currently asymptomatic  Review of Systems   Constitutional: Negative for activity change and unexpected weight change  HENT: Negative  Eyes: Negative  Respiratory: Negative  Cardiovascular: Negative  Gastrointestinal: Negative for abdominal distention and abdominal pain  Endocrine: Negative  Genitourinary: Negative for pelvic pain and vaginal bleeding  Musculoskeletal: Negative  Skin: Negative  Allergic/Immunologic: Negative  Neurological: Negative  Hematological: Negative  Psychiatric/Behavioral: Negative          Current Facility-Administered Medications   Medication Dose Route Frequency Provider Last Rate Last Dose    lactated ringers infusion  125 mL/hr Intravenous Continuous Jihan Michael MD           Allergies   Allergen Reactions    Dust Mite Extract     Other      Animal dander, trees    Penicillins Hives    Pollen Extract        Past Medical History:   Diagnosis Date    Asthma     Hyperlipidemia     LGSIL on Pap smear of cervix     Papanicolaou smear 2019    low grade squamous cell lesion    Post-menopausal        Past Surgical History:   Procedure Laterality Date    COLONOSCOPY      one polyp    MAMMO (HISTORICAL) Bilateral 2018    no evidence of malignancy    TOE SURGERY Right     alignment    TOOTH EXTRACTION      WISDOM TOOTH EXTRACTION         OB History        0    Para 0    Term   0       0    AB   0    Living   0       SAB   0    TAB   0    Ectopic   0    Multiple   0    Live Births   0                 Family History   Problem Relation Age of Onset    Colon cancer Mother     Neurological problems Mother         FAHR'S DISEASE    Hyperlipidemia Mother     Gallbladder disease Mother     Stroke Father     Hypertension Father     Leukemia Maternal Grandmother     Diabetes Paternal Grandmother        The following portions of the patient's history were reviewed and updated as appropriate: allergies, current medications, past family history, past medical history, past social history, past surgical history and problem list       Objective:    Blood pressure 159/74, pulse 78, temperature (!) 97 3 °F (36 3 °C), temperature source Temporal, resp  rate 18, height 4' 11" (1 499 m), weight 72 6 kg (160 lb), SpO2 97 %, not currently breastfeeding  Body mass index is 32 32 kg/m²  Physical Exam   Constitutional: She is oriented to person, place, and time  She appears well-developed and well-nourished  No distress  HENT:   Head: Normocephalic and atraumatic  Cardiovascular: Normal rate, regular rhythm and normal heart sounds  Exam reveals no friction rub  No murmur heard  Pulmonary/Chest: Effort normal and breath sounds normal  No stridor  No respiratory distress  She has no wheezes  She has no rales  Musculoskeletal: She exhibits no edema  Neurological: She is alert and oriented to person, place, and time  Skin: Skin is warm and dry  No rash noted  She is not diaphoretic  No erythema  No pallor  Psychiatric: She has a normal mood and affect   Her behavior is normal  Judgment and thought content normal          No results found for:   Lab Results   Component Value Date    WBC 6 9 2020    HGB 12 7 2020    HCT 39 1 2020    MCV 84 1 2020     2020     Lab Results   Component Value Date     08/15/2017    K 4 0 06/25/2020     06/25/2020    CO2 26 06/25/2020    BUN 14 06/25/2020    CREATININE 0 83 06/25/2020    CALCIUM 9 2 06/25/2020    AST 17 05/13/2020    ALT 13 05/13/2020    ALKPHOS 85 05/13/2020    PROT 6 4 08/15/2017    BILITOT 0 7 08/15/2017        Trend:  No results found for:

## 2020-07-14 ENCOUNTER — TELEPHONE (OUTPATIENT)
Dept: GYNECOLOGIC ONCOLOGY | Facility: CLINIC | Age: 60
End: 2020-07-14

## 2020-07-14 NOTE — TELEPHONE ENCOUNTER
Patient returned my call  Post-operative telephone call made to patient  Recovering well from surgery without questions or concerns

## 2020-07-14 NOTE — TELEPHONE ENCOUNTER
Called patient as a follow up after surgery, left message requesting for her to call back  Adjustment disorder

## 2020-08-06 ENCOUNTER — OFFICE VISIT (OUTPATIENT)
Dept: GYNECOLOGIC ONCOLOGY | Facility: CLINIC | Age: 60
End: 2020-08-06

## 2020-08-06 VITALS
TEMPERATURE: 98.5 F | SYSTOLIC BLOOD PRESSURE: 110 MMHG | HEART RATE: 75 BPM | BODY MASS INDEX: 31.65 KG/M2 | RESPIRATION RATE: 18 BRPM | HEIGHT: 59 IN | WEIGHT: 157 LBS | DIASTOLIC BLOOD PRESSURE: 72 MMHG

## 2020-08-06 DIAGNOSIS — N87.0 DYSPLASIA OF CERVIX, LOW GRADE (CIN 1): ICD-10-CM

## 2020-08-06 DIAGNOSIS — N89.1 VAGINAL INTRAEPITHELIAL NEOPLASIA GRADE 2: Primary | ICD-10-CM

## 2020-08-06 PROCEDURE — 3008F BODY MASS INDEX DOCD: CPT | Performed by: OBSTETRICS & GYNECOLOGY

## 2020-08-06 PROCEDURE — 3074F SYST BP LT 130 MM HG: CPT | Performed by: OBSTETRICS & GYNECOLOGY

## 2020-08-06 PROCEDURE — 3078F DIAST BP <80 MM HG: CPT | Performed by: OBSTETRICS & GYNECOLOGY

## 2020-08-06 PROCEDURE — 99024 POSTOP FOLLOW-UP VISIT: CPT | Performed by: OBSTETRICS & GYNECOLOGY

## 2020-08-06 NOTE — LETTER
August 6, 2020     Sarai Naidu MD  [address]    Patient: Dia Macias   YOB: 1960   Date of Visit: 8/6/2020       Dear Dr Sarah Mejia:    Thank you for referring Dia Macias to me for evaluation  Below are my notes for this consultation  If you have questions, please do not hesitate to call me  I look forward to following your patient along with you  Sincerely,        Gold Nice MD        CC: MD Gold Velasquez MD  8/6/2020  9:27 AM  Sign when Signing Visit  Assessment/Plan:    Problem List Items Addressed This Visit        Genitourinary    Dysplasia of cervix, low grade (ANDREAS 1)    Vaginal intraepithelial neoplasia grade 2 - Primary     58-year-old status post LEEP and CO2 laser on 7/9/2020 for moderate vaginal dysplasia, low-grade cervical dysplasia  The LEEP was negative  She is recovering well from surgery  Performance status is 0   1  She can return to normal activity  2  She will follow up with Dr Mikeal Duane for a Pap smear in approximately 6 months  CHIEF COMPLAINT:  Postoperative evaluation           Patient ID: Dia Macias is a 63 y o  female  58-year-old returns for postoperative evaluation  She has no complaints  No interval change in her medications or medical history since her last visit        The following portions of the patient's history were reviewed and updated as appropriate: allergies, current medications, past family history, past medical history, past social history, past surgical history and problem list     Review of Systems      Current Outpatient Medications:     Calcium 600 MG tablet, Take 2 tablets by mouth daily, Disp: , Rfl:     econazole nitrate 1 % cream, Apply topically daily (Patient taking differently: Apply topically as needed ), Disp: 85 g, Rfl: 1    fluticasone-salmeterol (ADVAIR DISKUS) 100-50 mcg/dose, Inhale 1 puff 2 (two) times a day , Disp: , Rfl:     loratadine (CLARITIN) 10 mg tablet, Take 1 tablet by mouth daily , Disp: , Rfl:     Multiple Vitamin (MULTIVITAMIN) capsule, Take 1 capsule by mouth daily, Disp: , Rfl:     rosuvastatin (CRESTOR) 20 MG tablet, Take 1 tablet (20 mg total) by mouth daily, Disp: 90 tablet, Rfl: 3    Vitamin D, Cholecalciferol, 400 units TABS, Take by mouth, Disp: , Rfl:     Objective:    Blood pressure 110/72, pulse 75, temperature 98 5 °F (36 9 °C), resp  rate 18, height 4' 11" (1 499 m), weight 71 2 kg (157 lb), not currently breastfeeding  Body mass index is 31 71 kg/m²  Body surface area is 1 66 meters squared  Physical Exam   Constitutional: She is oriented to person, place, and time  She appears well-developed  No distress  Pulmonary/Chest: Effort normal    Genitourinary:    Genitourinary Comments: The external female genitalia is normal  The bartholin's, uretheral and skenes glands are normal  The urethral meatus is normal (midline with no lesions)  Anus without fissure or lesion  Speculum exam reveals vagina without lesion or discharge  There is atrophy  A small amount of granulation tissue remains the bilateral apices  Overall, it is well healed  Cervix is normal appearing without visible lesions  No bleeding  No significant cystocele or rectocele noted  Neurological: She is alert and oriented to person, place, and time  Skin: She is not diaphoretic     Psychiatric: Her behavior is normal  Judgment and thought content normal

## 2020-08-06 NOTE — ASSESSMENT & PLAN NOTE
63year-old status post LEEP and CO2 laser on 7/9/2020 for moderate vaginal dysplasia, low-grade cervical dysplasia  The LEEP was negative  She is recovering well from surgery  Performance status is 0   1  She can return to normal activity  2  She will follow up with Dr Karina Noel for a Pap smear in approximately 6 months

## 2020-08-06 NOTE — PROGRESS NOTES
Assessment/Plan:    Problem List Items Addressed This Visit        Genitourinary    Dysplasia of cervix, low grade (ANDREAS 1)    Vaginal intraepithelial neoplasia grade 2 - Primary     63year-old status post LEEP and CO2 laser on 7/9/2020 for moderate vaginal dysplasia, low-grade cervical dysplasia  The LEEP was negative  She is recovering well from surgery  Performance status is 0   1  She can return to normal activity  2  She will follow up with Dr Joss Freitas for a Pap smear in approximately 6 months  CHIEF COMPLAINT:  Postoperative evaluation           Patient ID: Charisma Han is a 63 y o  female  79-year-old returns for postoperative evaluation  She has no complaints  No interval change in her medications or medical history since her last visit  The following portions of the patient's history were reviewed and updated as appropriate: allergies, current medications, past family history, past medical history, past social history, past surgical history and problem list     Review of Systems      Current Outpatient Medications:     Calcium 600 MG tablet, Take 2 tablets by mouth daily, Disp: , Rfl:     econazole nitrate 1 % cream, Apply topically daily (Patient taking differently: Apply topically as needed ), Disp: 85 g, Rfl: 1    fluticasone-salmeterol (ADVAIR DISKUS) 100-50 mcg/dose, Inhale 1 puff 2 (two) times a day , Disp: , Rfl:     loratadine (CLARITIN) 10 mg tablet, Take 1 tablet by mouth daily , Disp: , Rfl:     Multiple Vitamin (MULTIVITAMIN) capsule, Take 1 capsule by mouth daily, Disp: , Rfl:     rosuvastatin (CRESTOR) 20 MG tablet, Take 1 tablet (20 mg total) by mouth daily, Disp: 90 tablet, Rfl: 3    Vitamin D, Cholecalciferol, 400 units TABS, Take by mouth, Disp: , Rfl:     Objective:    Blood pressure 110/72, pulse 75, temperature 98 5 °F (36 9 °C), resp  rate 18, height 4' 11" (1 499 m), weight 71 2 kg (157 lb), not currently breastfeeding    Body mass index is 31 71 kg/m²  Body surface area is 1 66 meters squared  Physical Exam   Constitutional: She is oriented to person, place, and time  She appears well-developed  No distress  Pulmonary/Chest: Effort normal    Genitourinary:    Genitourinary Comments: The external female genitalia is normal  The bartholin's, uretheral and skenes glands are normal  The urethral meatus is normal (midline with no lesions)  Anus without fissure or lesion  Speculum exam reveals vagina without lesion or discharge  There is atrophy  A small amount of granulation tissue remains the bilateral apices  Overall, it is well healed  Cervix is normal appearing without visible lesions  No bleeding  No significant cystocele or rectocele noted  Neurological: She is alert and oriented to person, place, and time  Skin: She is not diaphoretic     Psychiatric: Her behavior is normal  Judgment and thought content normal

## 2020-08-10 ENCOUNTER — DOCUMENTATION (OUTPATIENT)
Dept: RADIATION ONCOLOGY | Facility: HOSPITAL | Age: 60
End: 2020-08-10

## 2020-08-10 NOTE — PROGRESS NOTES
Reviewed pt's DT upon which she rated her distress level as a 1 with dry/congested nose and tingling in hands/feet listed as problems  Reviewed pt's record in [address] [place] [address] Rd  Pt doesn't appear to meet need for additional psychosocial distress screening at this time  Please notify cancer counseling if the pt is in need of additional assistance

## 2020-08-11 ENCOUNTER — OFFICE VISIT (OUTPATIENT)
Dept: FAMILY MEDICINE CLINIC | Facility: CLINIC | Age: 60
End: 2020-08-11
Payer: COMMERCIAL

## 2020-08-11 VITALS
HEART RATE: 78 BPM | DIASTOLIC BLOOD PRESSURE: 80 MMHG | TEMPERATURE: 98.5 F | SYSTOLIC BLOOD PRESSURE: 132 MMHG | HEIGHT: 59 IN | BODY MASS INDEX: 31.69 KG/M2 | RESPIRATION RATE: 16 BRPM | WEIGHT: 157.2 LBS

## 2020-08-11 DIAGNOSIS — R58 ECCHYMOSIS: Primary | ICD-10-CM

## 2020-08-11 DIAGNOSIS — L30.9 DERMATITIS: ICD-10-CM

## 2020-08-11 PROCEDURE — 99213 OFFICE O/P EST LOW 20 MIN: CPT | Performed by: FAMILY MEDICINE

## 2020-08-11 PROCEDURE — 1036F TOBACCO NON-USER: CPT | Performed by: FAMILY MEDICINE

## 2020-08-11 PROCEDURE — 3079F DIAST BP 80-89 MM HG: CPT | Performed by: FAMILY MEDICINE

## 2020-08-11 PROCEDURE — 3075F SYST BP GE 130 - 139MM HG: CPT | Performed by: FAMILY MEDICINE

## 2020-08-11 PROCEDURE — 3008F BODY MASS INDEX DOCD: CPT | Performed by: FAMILY MEDICINE

## 2020-08-11 RX ORDER — CLOBETASOL PROPIONATE 0.5 MG/G
CREAM TOPICAL 2 TIMES DAILY
Qty: 30 G | Refills: 0 | Status: SHIPPED | OUTPATIENT
Start: 2020-08-11 | End: 2022-04-26

## 2020-08-11 NOTE — PROGRESS NOTES
Assessment/Plan:      Diagnoses and all orders for this visit:    Ecchymosis  -     clobetasol (TEMOVATE) 0 05 % cream; Apply topically 2 (two) times a day    Dermatitis  -     clobetasol (TEMOVATE) 0 05 % cream; Apply topically 2 (two) times a day        Discussion: ecchymotic eruption - ? Related to other exposure (insect bite, other plant exposure)  No signs of infection  REC: start clobetasol cream bid x 7-10d as directed  Recheck 3-4d if not improving - earlier if worse      Subjective:     Patient ID: Rod Bates is a 63 y o  female  2 day hx of R lower leg eruption  Was gardening and has developed poison ivy dermatitis on her hands and forearms  Was pruritic but is not stinging/mildly painful  No discharge/drainage  No fever/chills          Review of Systems   Constitutional: Negative  Musculoskeletal: Negative  Skin: Positive for rash  Negative for wound  Neurological: Negative  Objective:     Physical Exam  Vitals signs reviewed  Constitutional:       Appearance: Normal appearance  Skin:     Capillary Refill: Capillary refill takes less than 2 seconds  Findings: Rash (mild rhus on arms  Nonblanching erythematous lesions in lower leg  Sl TTP  ) present  Neurological:      Mental Status: She is alert  Sensory: No sensory deficit

## 2020-09-15 ENCOUNTER — OFFICE VISIT (OUTPATIENT)
Dept: FAMILY MEDICINE CLINIC | Facility: CLINIC | Age: 60
End: 2020-09-15
Payer: COMMERCIAL

## 2020-09-15 VITALS
WEIGHT: 159 LBS | SYSTOLIC BLOOD PRESSURE: 122 MMHG | DIASTOLIC BLOOD PRESSURE: 78 MMHG | BODY MASS INDEX: 32.05 KG/M2 | HEIGHT: 59 IN | HEART RATE: 76 BPM | TEMPERATURE: 97.9 F

## 2020-09-15 DIAGNOSIS — E78.2 MIXED HYPERLIPIDEMIA: ICD-10-CM

## 2020-09-15 DIAGNOSIS — Z23 IMMUNIZATION DUE: ICD-10-CM

## 2020-09-15 DIAGNOSIS — J45.20 MILD INTERMITTENT ASTHMA WITHOUT COMPLICATION: ICD-10-CM

## 2020-09-15 DIAGNOSIS — I10 ESSENTIAL HYPERTENSION: Primary | ICD-10-CM

## 2020-09-15 PROCEDURE — 90471 IMMUNIZATION ADMIN: CPT | Performed by: FAMILY MEDICINE

## 2020-09-15 PROCEDURE — 90682 RIV4 VACC RECOMBINANT DNA IM: CPT | Performed by: FAMILY MEDICINE

## 2020-09-15 PROCEDURE — 99214 OFFICE O/P EST MOD 30 MIN: CPT | Performed by: FAMILY MEDICINE

## 2020-09-15 NOTE — PROGRESS NOTES
Assessment/Plan:    Asthma  Well controlled  Flu shot given  Continue present care  Recheck 6m    Essential hypertension  Well controlled  Cont present treatment  Monitor labs  Counseled re: diet, exercise and weight loss effects on BP  BMI Counseling: Body mass index is 32 11 kg/m²  The BMI is above normal  Nutrition recommendations include reducing portion sizes, decreasing overall calorie intake, consuming healthier snacks, moderation in carbohydrate intake, increasing intake of lean protein, reducing intake of saturated fat and trans fat and reducing intake of cholesterol  Recheck 6m      Hyperlipidemia  LDL stable on Crestor 20mg qd  Urged diet control  Recheck 6m       Diagnoses and all orders for this visit:    Essential hypertension  -     CBC and differential; Future  -     Comprehensive metabolic panel; Future  -     Lipid panel; Future    Mild intermittent asthma without complication    Mixed hyperlipidemia    Immunization due  -     Cancel: influenza vaccine, quadrivalent, recombinant, PF, 0 5 mL  -     FLUBLOK: influenza vaccine, quadrivalent, recombinant, PF, 0 5 mL          Subjective:      Patient ID: Kristine Helton is a 63 y o  female  F/u multiple med issues  - pt states that she has been doing well  - pt with abnormal pap earlier this year  Had cone procedure done  Continues to f/u with Gyn  - still walking regularly for exercise  Compliant with hip stretches  - pt  denies CP, palpitations, lightheadedness or other CV symptoms with or without exertion  - pt states that her asthma is stable  Pt is up to date with allergist and is compliant with Advair bid  Would like to have a flu shot today  - pt denies any new GI complaints   Pt is due for colonoscopy this year        The following portions of the patient's history were reviewed and updated as appropriate: She  has a past medical history of Asthma, Hyperlipidemia, LGSIL on Pap smear of cervix, Papanicolaou smear (01/23/2019), and Post-menopausal (2013)  She   Patient Active Problem List    Diagnosis Date Noted    Dysplasia of cervix, low grade (ANDREAS 1) 04/02/2020    Vaginal intraepithelial neoplasia grade 2 04/02/2020    Essential hypertension 02/14/2020    Chronic bilateral low back pain with left-sided sciatica 04/24/2018    Degenerative arthritis of thumb, right 09/14/2015    Carpal tunnel syndrome 02/19/2015    Osteopenia 08/21/2014    Asthma 02/07/2013    Allergic rhinitis 08/07/2012    Hyperlipidemia 08/07/2012     She  has a past surgical history that includes Tooth extraction; Toe Surgery (Right); [place] tooth extraction; Colonoscopy (2008); Mammo (historical) (Bilateral, 08/09/2018); pr destruct,vaginal lesion(s),simple (N/A, 7/9/2020); and pr conization cervix,loop electrd (N/A, 7/9/2020)  She  reports that she has never smoked  She has never used smokeless tobacco  She reports current alcohol use  She reports that she does not use drugs  Current Outpatient Medications   Medication Sig Dispense Refill    Calcium 600 MG tablet Take 2 tablets by mouth daily      clobetasol (TEMOVATE) 0 05 % cream Apply topically 2 (two) times a day 30 g 0    econazole nitrate 1 % cream Apply topically daily (Patient taking differently: Apply topically as needed ) 85 g 1    fluticasone-salmeterol (ADVAIR DISKUS) 100-50 mcg/dose Inhale 1 puff 2 (two) times a day       loratadine (CLARITIN) 10 mg tablet Take 1 tablet by mouth daily       Multiple Vitamin (MULTIVITAMIN) capsule Take 1 capsule by mouth daily      rosuvastatin (CRESTOR) 20 MG tablet Take 1 tablet (20 mg total) by mouth daily 90 tablet 3    Vitamin D, Cholecalciferol, 400 units TABS Take by mouth       No current facility-administered medications for this visit  She is allergic to dust mite extract; other; penicillins; and pollen extract       Review of Systems   Constitutional: Negative  HENT: Negative  Eyes: Negative  Respiratory: Negative  Cardiovascular: Negative  Gastrointestinal: Negative  Endocrine: Negative  Genitourinary: Negative  Musculoskeletal: Negative  Skin: Negative  Allergic/Immunologic: Negative  Neurological: Negative  Hematological: Negative  Psychiatric/Behavioral: Negative  Objective:      /78   Pulse 76   Temp 97 9 °F (36 6 °C)   Ht 4' 11" (1 499 m)   Wt 72 1 kg (159 lb)   LMP  (LMP Unknown)   BMI 32 11 kg/m²          Physical Exam  Vitals signs reviewed  Constitutional:       Appearance: She is well-developed  She is not diaphoretic  HENT:      Head: Normocephalic and atraumatic  Right Ear: Tympanic membrane, ear canal and external ear normal       Left Ear: Tympanic membrane, ear canal and external ear normal    Eyes:      Extraocular Movements: Extraocular movements intact  Conjunctiva/sclera: Conjunctivae normal       Pupils: Pupils are equal, round, and reactive to light  Neck:      Musculoskeletal: Normal range of motion and neck supple  No muscular tenderness  Thyroid: No thyromegaly  Vascular: No JVD  Cardiovascular:      Rate and Rhythm: Normal rate and regular rhythm  Pulses: Normal pulses  Heart sounds: No murmur  Pulmonary:      Effort: Pulmonary effort is normal       Breath sounds: Normal breath sounds  Abdominal:      General: There is no distension  Palpations: Abdomen is soft  There is no mass  Tenderness: There is no abdominal tenderness  Musculoskeletal: Normal range of motion  General: No swelling, tenderness or deformity  Right lower leg: No edema  Left lower leg: No edema  Lymphadenopathy:      Cervical: No cervical adenopathy  Skin:     General: Skin is warm and dry  Capillary Refill: Capillary refill takes less than 2 seconds  Neurological:      General: No focal deficit present  Mental Status: She is alert and oriented to person, place, and time  Cranial Nerves:  No cranial nerve deficit  Sensory: No sensory deficit  Motor: No weakness or abnormal muscle tone  Coordination: Coordination normal       Deep Tendon Reflexes: Reflexes are normal and symmetric  Reflexes normal    Psychiatric:         Mood and Affect: Mood normal          Behavior: Behavior normal          Thought Content:  Thought content normal          Judgment: Judgment normal

## 2020-09-16 NOTE — ASSESSMENT & PLAN NOTE
Well controlled  Cont present treatment  Monitor labs  Counseled re: diet, exercise and weight loss effects on BP  BMI Counseling: Body mass index is 32 11 kg/m²  The BMI is above normal  Nutrition recommendations include reducing portion sizes, decreasing overall calorie intake, consuming healthier snacks, moderation in carbohydrate intake, increasing intake of lean protein, reducing intake of saturated fat and trans fat and reducing intake of cholesterol   Recheck 6m

## 2020-10-05 ENCOUNTER — OFFICE VISIT (OUTPATIENT)
Dept: OBGYN CLINIC | Facility: CLINIC | Age: 60
End: 2020-10-05
Payer: COMMERCIAL

## 2020-10-05 VITALS
WEIGHT: 154 LBS | BODY MASS INDEX: 32.32 KG/M2 | SYSTOLIC BLOOD PRESSURE: 140 MMHG | HEIGHT: 58 IN | DIASTOLIC BLOOD PRESSURE: 80 MMHG

## 2020-10-05 DIAGNOSIS — L73.8 BACTERIAL FOLLICULITIS: Primary | ICD-10-CM

## 2020-10-05 PROCEDURE — 3077F SYST BP >= 140 MM HG: CPT | Performed by: OBSTETRICS & GYNECOLOGY

## 2020-10-05 PROCEDURE — 3079F DIAST BP 80-89 MM HG: CPT | Performed by: OBSTETRICS & GYNECOLOGY

## 2020-10-05 PROCEDURE — 1036F TOBACCO NON-USER: CPT | Performed by: OBSTETRICS & GYNECOLOGY

## 2020-10-05 PROCEDURE — 99213 OFFICE O/P EST LOW 20 MIN: CPT | Performed by: OBSTETRICS & GYNECOLOGY

## 2020-10-05 RX ORDER — AZITHROMYCIN 250 MG/1
TABLET, FILM COATED ORAL
Qty: 6 TABLET | Refills: 0 | Status: SHIPPED | OUTPATIENT
Start: 2020-10-05 | End: 2020-10-09

## 2020-11-20 LAB
ALBUMIN SERPL-MCNC: 4 G/DL (ref 3.6–5.1)
ALBUMIN/GLOB SERPL: 1.9 (CALC) (ref 1–2.5)
ALP SERPL-CCNC: 80 U/L (ref 37–153)
ALT SERPL-CCNC: 14 U/L (ref 6–29)
AST SERPL-CCNC: 20 U/L (ref 10–35)
BASOPHILS # BLD AUTO: 40 CELLS/UL (ref 0–200)
BASOPHILS NFR BLD AUTO: 0.7 %
BILIRUB SERPL-MCNC: 0.9 MG/DL (ref 0.2–1.2)
BUN SERPL-MCNC: 15 MG/DL (ref 7–25)
BUN/CREAT SERPL: NORMAL (CALC) (ref 6–22)
CALCIUM SERPL-MCNC: 9.2 MG/DL (ref 8.6–10.4)
CHLORIDE SERPL-SCNC: 107 MMOL/L (ref 98–110)
CHOLEST SERPL-MCNC: 210 MG/DL
CHOLEST/HDLC SERPL: 3.9 (CALC)
CO2 SERPL-SCNC: 28 MMOL/L (ref 20–32)
CREAT SERPL-MCNC: 0.83 MG/DL (ref 0.5–0.99)
EOSINOPHIL # BLD AUTO: 143 CELLS/UL (ref 15–500)
EOSINOPHIL NFR BLD AUTO: 2.5 %
ERYTHROCYTE [DISTWIDTH] IN BLOOD BY AUTOMATED COUNT: 13 % (ref 11–15)
GLOBULIN SER CALC-MCNC: 2.1 G/DL (CALC) (ref 1.9–3.7)
GLUCOSE SERPL-MCNC: 95 MG/DL (ref 65–99)
HCT VFR BLD AUTO: 40.1 % (ref 35–45)
HDLC SERPL-MCNC: 54 MG/DL
HGB BLD-MCNC: 12.9 G/DL (ref 11.7–15.5)
LDLC SERPL CALC-MCNC: 139 MG/DL (CALC)
LYMPHOCYTES # BLD AUTO: 1989 CELLS/UL (ref 850–3900)
LYMPHOCYTES NFR BLD AUTO: 34.9 %
MCH RBC QN AUTO: 27.5 PG (ref 27–33)
MCHC RBC AUTO-ENTMCNC: 32.2 G/DL (ref 32–36)
MCV RBC AUTO: 85.5 FL (ref 80–100)
MONOCYTES # BLD AUTO: 433 CELLS/UL (ref 200–950)
MONOCYTES NFR BLD AUTO: 7.6 %
NEUTROPHILS # BLD AUTO: 3095 CELLS/UL (ref 1500–7800)
NEUTROPHILS NFR BLD AUTO: 54.3 %
NONHDLC SERPL-MCNC: 156 MG/DL (CALC)
PLATELET # BLD AUTO: 277 THOUSAND/UL (ref 140–400)
PMV BLD REES-ECKER: 10.5 FL (ref 7.5–12.5)
POTASSIUM SERPL-SCNC: 4 MMOL/L (ref 3.5–5.3)
PROT SERPL-MCNC: 6.1 G/DL (ref 6.1–8.1)
RBC # BLD AUTO: 4.69 MILLION/UL (ref 3.8–5.1)
SL AMB EGFR AFRICAN AMERICAN: 88 ML/MIN/1.73M2
SL AMB EGFR NON AFRICAN AMERICAN: 76 ML/MIN/1.73M2
SODIUM SERPL-SCNC: 142 MMOL/L (ref 135–146)
TRIGL SERPL-MCNC: 74 MG/DL
WBC # BLD AUTO: 5.7 THOUSAND/UL (ref 3.8–10.8)

## 2021-02-08 ENCOUNTER — ANNUAL EXAM (OUTPATIENT)
Dept: OBGYN CLINIC | Facility: CLINIC | Age: 61
End: 2021-02-08
Payer: COMMERCIAL

## 2021-02-08 VITALS
SYSTOLIC BLOOD PRESSURE: 120 MMHG | BODY MASS INDEX: 37.03 KG/M2 | DIASTOLIC BLOOD PRESSURE: 80 MMHG | WEIGHT: 160 LBS | HEIGHT: 55 IN

## 2021-02-08 DIAGNOSIS — Z01.419 ENCOUNTER FOR ANNUAL ROUTINE GYNECOLOGICAL EXAMINATION: ICD-10-CM

## 2021-02-08 DIAGNOSIS — N95.2 VAGINAL ATROPHY: ICD-10-CM

## 2021-02-08 DIAGNOSIS — R87.612 LOW GRADE SQUAMOUS INTRAEPITH LESION ON CYTOLOGIC SMEAR CERVIX (LGSIL): ICD-10-CM

## 2021-02-08 DIAGNOSIS — Z12.31 ENCOUNTER FOR SCREENING MAMMOGRAM FOR BREAST CANCER: Primary | ICD-10-CM

## 2021-02-08 PROCEDURE — G0143 SCR C/V CYTO,THINLAYER,RESCR: HCPCS | Performed by: PATHOLOGY

## 2021-02-08 PROCEDURE — 87624 HPV HI-RISK TYP POOLED RSLT: CPT | Performed by: OBSTETRICS & GYNECOLOGY

## 2021-02-08 PROCEDURE — 88141 CYTOPATH C/V INTERPRET: CPT | Performed by: PATHOLOGY

## 2021-02-08 PROCEDURE — S0612 ANNUAL GYNECOLOGICAL EXAMINA: HCPCS | Performed by: OBSTETRICS & GYNECOLOGY

## 2021-02-08 NOTE — PROGRESS NOTES
Assessment/Plan:    Normal breast and gyn exam except vaginal atrophy  Occasional spotting with intercourse  Status post LEEP and laser for ANDRESA 1 and Marcelle Severs by gyn oncology  June 2020  Here for 6 month Pap smear  Colonoscopy  June 2020 with 3 polyps  Due for repeat at 3 years  Family history of colon cancer (mother)    Plan:  Check Pap smear  Rx mammogram   Add vitamin-C and zinc to her regimen and vitamin-D 3  Patient is on the list for COVID vaccine  Continue healthy diet exercise  Recommend using lubricants  Subjective: GO     Patient ID: Meliza Mirza is a 63 y o  female presents for yearly exam with no complaints except for occasional vaginal spotting after intercourse  Patient is status post cervical LEEP and laser of lesions in the vagina  Her diagnosis is ANDREAS 1and Vain2 by gyn oncology July 2020  Patient is here for follow-up Pap smear  Patient denies any breast bowel or bladder issues  No change in family history  Medications reviewed  Review of Systems   Constitutional: Negative  Negative for fatigue, fever and unexpected weight change  HENT: Negative  Eyes: Negative  Respiratory: Negative  Negative for chest tightness, shortness of breath, wheezing and stridor  Cardiovascular: Negative  Negative for chest pain, palpitations and leg swelling  Gastrointestinal: Negative  Negative for abdominal pain, blood in stool, diarrhea, nausea, rectal pain and vomiting  Endocrine: Negative  Genitourinary: Negative for dysuria, frequency, vaginal bleeding, vaginal discharge and vaginal pain  Occasional vaginal spotting following intercourse  Musculoskeletal: Negative  Skin: Negative  Allergic/Immunologic: Negative  Neurological: Negative  Hematological: Negative  Psychiatric/Behavioral: Negative  All other systems reviewed and are negative          Objective:      /80   Ht 4' 7 12" (1 4 m)   Wt 72 6 kg (160 lb)   LMP  (LMP Unknown)   BMI 37 03 kg/m²          Physical Exam  Constitutional:       Appearance: She is well-developed  HENT:      Head: Normocephalic and atraumatic  Neck:      Musculoskeletal: Normal range of motion and neck supple  Thyroid: No thyromegaly  Trachea: No tracheal deviation  Cardiovascular:      Rate and Rhythm: Normal rate and regular rhythm  Heart sounds: Normal heart sounds  Pulmonary:      Effort: Pulmonary effort is normal  No respiratory distress  Breath sounds: Normal breath sounds  No stridor  No wheezing or rales  Chest:      Chest wall: No tenderness  Breasts: Breasts are symmetrical          Right: No inverted nipple, mass, nipple discharge, skin change or tenderness  Left: No inverted nipple, mass, nipple discharge, skin change or tenderness  Abdominal:      General: Bowel sounds are normal  There is no distension  Palpations: Abdomen is soft  There is no mass  Tenderness: There is no abdominal tenderness  There is no guarding or rebound  Hernia: No hernia is present  There is no hernia in the left inguinal area  Genitourinary:     Labia:         Right: No rash, tenderness, lesion or injury  Left: No rash, tenderness, lesion or injury  Vagina: Normal  No signs of injury and foreign body  No vaginal discharge, erythema, tenderness or bleeding  Cervix: No cervical motion tenderness, discharge or friability  Uterus: Not deviated, not enlarged, not fixed and not tender  Adnexa:         Right: No mass, tenderness or fullness  Left: No mass, tenderness or fullness  Rectum: No mass, anal fissure, external hemorrhoid or internal hemorrhoid  Comments: Normal urethra  Normal Barling's glands  Vaginal atrophy  Slight bleeding with placement of the speculum  No uterine prolapse  No cystocele rectocele  Lymphadenopathy:      Lower Body: No right inguinal adenopathy   No left inguinal adenopathy  Skin:     General: Skin is warm and dry  Neurological:      Mental Status: She is alert and oriented to person, place, and time  Psychiatric:         Behavior: Behavior normal          Thought Content:  Thought content normal          Judgment: Judgment normal

## 2021-02-08 NOTE — PROGRESS NOTES
The patient is here for a yearly  She is due for a pap smear  pap LSIL 2/6/20, pap normal 9/9/19, pap LGSIL HPV neg 1/23/19  The patient has a little bleeding after intercourse  The patient has vaginal itching sometimes when she has the bleeding  No other vaginal problems  No bowel, bladder or breast problems

## 2021-02-12 LAB
LAB AP GYN PRIMARY INTERPRETATION: ABNORMAL
Lab: ABNORMAL
PATH INTERP SPEC-IMP: ABNORMAL

## 2021-03-19 ENCOUNTER — OFFICE VISIT (OUTPATIENT)
Dept: FAMILY MEDICINE CLINIC | Facility: CLINIC | Age: 61
End: 2021-03-19
Payer: COMMERCIAL

## 2021-03-19 VITALS
HEIGHT: 57 IN | DIASTOLIC BLOOD PRESSURE: 74 MMHG | BODY MASS INDEX: 34.52 KG/M2 | SYSTOLIC BLOOD PRESSURE: 120 MMHG | TEMPERATURE: 97.9 F | HEART RATE: 76 BPM | WEIGHT: 160 LBS

## 2021-03-19 DIAGNOSIS — Z00.00 ANNUAL PHYSICAL EXAM: Primary | ICD-10-CM

## 2021-03-19 DIAGNOSIS — J45.20 MILD INTERMITTENT ASTHMA WITHOUT COMPLICATION: ICD-10-CM

## 2021-03-19 DIAGNOSIS — I10 ESSENTIAL HYPERTENSION: ICD-10-CM

## 2021-03-19 DIAGNOSIS — E78.00 HYPERCHOLESTEROLEMIA: ICD-10-CM

## 2021-03-19 DIAGNOSIS — E78.2 MIXED HYPERLIPIDEMIA: ICD-10-CM

## 2021-03-19 PROCEDURE — 1036F TOBACCO NON-USER: CPT | Performed by: FAMILY MEDICINE

## 2021-03-19 PROCEDURE — 3008F BODY MASS INDEX DOCD: CPT | Performed by: FAMILY MEDICINE

## 2021-03-19 PROCEDURE — 3074F SYST BP LT 130 MM HG: CPT | Performed by: FAMILY MEDICINE

## 2021-03-19 PROCEDURE — 99396 PREV VISIT EST AGE 40-64: CPT | Performed by: FAMILY MEDICINE

## 2021-03-19 PROCEDURE — 3078F DIAST BP <80 MM HG: CPT | Performed by: FAMILY MEDICINE

## 2021-03-19 RX ORDER — ROSUVASTATIN CALCIUM 20 MG/1
20 TABLET, COATED ORAL DAILY
Qty: 90 TABLET | Refills: 3 | Status: SHIPPED | OUTPATIENT
Start: 2021-03-19 | End: 2022-03-29 | Stop reason: SDUPTHER

## 2021-03-19 NOTE — PATIENT INSTRUCTIONS

## 2021-03-19 NOTE — PROGRESS NOTES
ADULT ANNUAL [address]    NAME: Ronnie Urrutia  AGE: 63 y o  SEX: female  : 1960     DATE: 3/20/2021     Assessment and Plan:     Problem List Items Addressed This Visit        Respiratory    Asthma      Stable on had there  Continue present treatment  Discussed COVID precautions  Recheck 1 year         Relevant Orders    CBC and differential       Cardiovascular and Mediastinum    RESOLVED: Essential hypertension     Controled off meds  Will resolve issue for now            Other    Hyperlipidemia      LDL remains elevated despite 20 mg of rosuvastatin  Urged dietary compliance  Recheck 1 year         Relevant Medications    rosuvastatin (CRESTOR) 20 MG tablet    Other Relevant Orders    Comprehensive metabolic panel    Lipid panel      Other Visit Diagnoses     Annual physical exam    -  Primary    Hypercholesterolemia        Relevant Medications    rosuvastatin (CRESTOR) 20 MG tablet          Immunizations and preventive care screenings were discussed with patient today  Appropriate education was printed on patient's after visit summary  Counseling:  · Exercise: the importance of regular exercise/physical activity was discussed  Recommend exercise 3-5 times per week for at least 30 minutes  · BMI Counseling: Body mass index is 34 62 kg/m²  The BMI is above normal  Nutrition recommendations include reducing portion sizes, 3-5 servings of fruits/vegetables daily, consuming healthier snacks, moderation in carbohydrate intake, increasing intake of lean protein, reducing intake of saturated fat and trans fat and reducing intake of cholesterol  Return in about 1 year (around 3/19/2022)  Chief Complaint:     Chief Complaint   Patient presents with    Physical Exam      History of Present Illness:     Adult Annual Physical   Patient here for a comprehensive physical exam  The patient reports no problems      Diet and Physical Activity  · Diet/Nutrition: well balanced diet  · Exercise: no formal exercise  Dropped off during the winter, but just restarted as the weather gets warmer     Depression Screening  PHQ-9 Depression Screening    PHQ-9:   Frequency of the following problems over the past two weeks:      Little interest or pleasure in doing things: 0 - not at all  Feeling down, depressed, or hopeless: 0 - not at all       General Health  · Sleep: sleeps well and gets 7-8 hours of sleep on average  · Hearing: normal - bilateral   · Vision: most recent eye exam <1 year ago and wears glasses  · Dental: regular dental visits and brushes teeth twice daily  /GYN Health  · Patient is: postmenopausal  · Last menstrual period: age 46  · Contraceptive method: n/a  Review of Systems:     Review of Systems   Constitutional: Negative  HENT: Negative  Eyes: Negative  Respiratory: Negative  Cardiovascular: Negative  Gastrointestinal: Negative  Endocrine: Negative  Genitourinary: Negative  Musculoskeletal: Negative  Skin: Negative  Allergic/Immunologic: Negative  Neurological: Negative  Hematological: Negative  Psychiatric/Behavioral: Negative         Past Medical History:     Past Medical History:   Diagnosis Date    Asthma     Hyperlipidemia     LGSIL on Pap smear of cervix     Papanicolaou smear 01/23/2019    low grade squamous cell lesion    Post-menopausal 2013    Precancerous lesion       Past Surgical History:     Past Surgical History:   Procedure Laterality Date    COLONOSCOPY  2008    one polyp    MAMMO (HISTORICAL) Bilateral 08/09/2018    no evidence of malignancy    OR CONIZATION CERVIX,LOOP ELECTRD N/A 7/9/2020    Procedure: LEEP;  Surgeon: Silvio Zaman MD;  [facility];  Service: Gynecology Oncology    OR DESTRUCT,VAGINAL LESION(S),SIMPLE N/A 7/9/2020    Procedure: CO2 LASER VAGINA;  Surgeon: Silvio Zaman MD;  [facility];  Service: Gynecology Oncology    TOE SURGERY Right     alignment    TOOTH EXTRACTION      WISDOM TOOTH EXTRACTION        Social History:     E-Cigarette/Vaping    E-Cigarette Use Never User      E-Cigarette/Vaping Substances    Nicotine No     THC No     CBD No     Flavoring No     Other No     Unknown No      Social History     Socioeconomic History    Marital status: Single     Spouse name: None    Number of children: None    Years of education: None    Highest education level: None   Occupational History    None   Social Needs    Financial resource strain: None    Food insecurity     Worry: None     Inability: None    Transportation needs     Medical: None     Non-medical: None   Tobacco Use    Smoking status: Never Smoker    Smokeless tobacco: Never Used   Substance and Sexual Activity    Alcohol use: Yes     Frequency: 2-3 times a week     Drinks per session: 1 or 2     Comment: social    Drug use: No    Sexual activity: Yes     Partners: Male     Birth control/protection: Post-menopausal   Lifestyle    Physical activity     Days per week: None     Minutes per session: None    Stress: None   Relationships    Social connections     Talks on phone: None     Gets together: None     Attends Denominational service: None     Active member of club or organization: None     Attends meetings of clubs or organizations: None     Relationship status: None    Intimate partner violence     Fear of current or ex partner: None     Emotionally abused: None     Physically abused: None     Forced sexual activity: None   Other Topics Concern    None   Social History Narrative    BEREAVEMENT    DAILY COFFEE CONSUMPTION (__CUPS/DAY)    DAILY COLA CONSUMPTION (__CANS/DAY)    DAILY TEA CONSUMPTION (__CUPS/DAY)      Family History:     Family History   Problem Relation Age of Onset    Colon cancer Mother     Neurological problems Mother         FAHR'S DISEASE    Hyperlipidemia Mother     Gallbladder disease Mother    Kurt Quezada Stroke Father     Hypertension Father     Leukemia Maternal Grandmother     Diabetes Paternal Grandmother       Current Medications:     Current Outpatient Medications   Medication Sig Dispense Refill    Calcium 600 MG tablet Take 2 tablets by mouth daily      clobetasol (TEMOVATE) 0 05 % cream Apply topically 2 (two) times a day 30 g 0    econazole nitrate 1 % cream Apply topically daily (Patient not taking: Reported on 10/5/2020) 85 g 1    fluticasone-salmeterol (ADVAIR DISKUS) 100-50 mcg/dose Inhale 1 puff 2 (two) times a day       loratadine (CLARITIN) 10 mg tablet Take 1 tablet by mouth daily       Multiple Vitamin (MULTIVITAMIN) capsule Take 1 capsule by mouth daily      rosuvastatin (CRESTOR) 20 MG tablet Take 1 tablet (20 mg total) by mouth daily 90 tablet 3    Vitamin D, Cholecalciferol, 400 units TABS Take by mouth       No current facility-administered medications for this visit  Allergies: Allergies   Allergen Reactions    Dust Mite Extract     Other      Animal dander, trees    Penicillins Hives    Pollen Extract       Physical Exam:     /74   Pulse 76   Temp 97 9 °F (36 6 °C)   Ht 4' 9" (1 448 m)   Wt 72 6 kg (160 lb)   LMP  (LMP Unknown)   BMI 34 62 kg/m²     Physical Exam  Vitals signs reviewed  Constitutional:       Appearance: She is well-developed  HENT:      Head: Normocephalic and atraumatic  Right Ear: Tympanic membrane, ear canal and external ear normal       Left Ear: Tympanic membrane, ear canal and external ear normal    Eyes:      Extraocular Movements: Extraocular movements intact  Conjunctiva/sclera: Conjunctivae normal       Pupils: Pupils are equal, round, and reactive to light  Neck:      Musculoskeletal: Normal range of motion and neck supple  Thyroid: No thyromegaly  Vascular: No JVD  Cardiovascular:      Rate and Rhythm: Normal rate and regular rhythm  Pulses: Normal pulses        Heart sounds: Normal heart sounds  No murmur  Pulmonary:      Effort: Pulmonary effort is normal       Breath sounds: Normal breath sounds  No wheezing  Abdominal:      General: Bowel sounds are normal  There is no distension  Palpations: Abdomen is soft  There is no mass  Tenderness: There is no abdominal tenderness  Musculoskeletal: Normal range of motion  General: No swelling, tenderness or deformity  Right lower leg: No edema  Left lower leg: No edema  Lymphadenopathy:      Cervical: No cervical adenopathy  Skin:     General: Skin is warm  Capillary Refill: Capillary refill takes less than 2 seconds  Neurological:      Mental Status: She is alert and oriented to person, place, and time  Cranial Nerves: No cranial nerve deficit  Sensory: No sensory deficit  Motor: No weakness or abnormal muscle tone  Coordination: Coordination normal       Gait: Gait normal       Deep Tendon Reflexes: Reflexes normal    Psychiatric:         Mood and Affect: Mood normal          Behavior: Behavior normal          Thought Content:  Thought content normal       Comments: PHQ-9 Depression Screening    PHQ-9:   Frequency of the following problems over the past two weeks:      Little interest or pleasure in doing things: 0 - not at all  Feeling down, depressed, or hopeless: 0 - not at all               MD Kyle Reddy

## 2021-04-08 DIAGNOSIS — Z23 ENCOUNTER FOR IMMUNIZATION: ICD-10-CM

## 2021-04-19 ENCOUNTER — IMMUNIZATIONS (OUTPATIENT)
Dept: FAMILY MEDICINE CLINIC | Facility: HOSPITAL | Age: 61
End: 2021-04-19

## 2021-04-19 DIAGNOSIS — Z23 ENCOUNTER FOR IMMUNIZATION: Primary | ICD-10-CM

## 2021-04-19 PROCEDURE — 0001A SARS-COV-2 / COVID-19 MRNA VACCINE (PFIZER-BIONTECH) 30 MCG: CPT

## 2021-04-19 PROCEDURE — 91300 SARS-COV-2 / COVID-19 MRNA VACCINE (PFIZER-BIONTECH) 30 MCG: CPT

## 2021-05-11 ENCOUNTER — IMMUNIZATIONS (OUTPATIENT)
Dept: FAMILY MEDICINE CLINIC | Facility: HOSPITAL | Age: 61
End: 2021-05-11

## 2021-05-11 DIAGNOSIS — Z23 ENCOUNTER FOR IMMUNIZATION: Primary | ICD-10-CM

## 2021-05-11 PROCEDURE — 91300 SARS-COV-2 / COVID-19 MRNA VACCINE (PFIZER-BIONTECH) 30 MCG: CPT

## 2021-05-11 PROCEDURE — 0002A SARS-COV-2 / COVID-19 MRNA VACCINE (PFIZER-BIONTECH) 30 MCG: CPT

## 2021-05-28 LAB
ALBUMIN SERPL-MCNC: 4 G/DL (ref 3.6–5.1)
ALBUMIN/GLOB SERPL: 1.7 (CALC) (ref 1–2.5)
ALP SERPL-CCNC: 83 U/L (ref 37–153)
ALT SERPL-CCNC: 13 U/L (ref 6–29)
AST SERPL-CCNC: 17 U/L (ref 10–35)
BASOPHILS # BLD AUTO: 27 CELLS/UL (ref 0–200)
BASOPHILS NFR BLD AUTO: 0.4 %
BILIRUB SERPL-MCNC: 1 MG/DL (ref 0.2–1.2)
BUN SERPL-MCNC: 16 MG/DL (ref 7–25)
BUN/CREAT SERPL: NORMAL (CALC) (ref 6–22)
CALCIUM SERPL-MCNC: 9.1 MG/DL (ref 8.6–10.4)
CHLORIDE SERPL-SCNC: 107 MMOL/L (ref 98–110)
CHOLEST SERPL-MCNC: 206 MG/DL
CHOLEST/HDLC SERPL: 3.7 (CALC)
CO2 SERPL-SCNC: 26 MMOL/L (ref 20–32)
CREAT SERPL-MCNC: 0.82 MG/DL (ref 0.5–0.99)
EOSINOPHIL # BLD AUTO: 161 CELLS/UL (ref 15–500)
EOSINOPHIL NFR BLD AUTO: 2.4 %
ERYTHROCYTE [DISTWIDTH] IN BLOOD BY AUTOMATED COUNT: 13.1 % (ref 11–15)
GLOBULIN SER CALC-MCNC: 2.4 G/DL (CALC) (ref 1.9–3.7)
GLUCOSE SERPL-MCNC: 95 MG/DL (ref 65–99)
HCT VFR BLD AUTO: 39.8 % (ref 35–45)
HDLC SERPL-MCNC: 55 MG/DL
HGB BLD-MCNC: 12.8 G/DL (ref 11.7–15.5)
LDLC SERPL CALC-MCNC: 135 MG/DL (CALC)
LYMPHOCYTES # BLD AUTO: 2338 CELLS/UL (ref 850–3900)
LYMPHOCYTES NFR BLD AUTO: 34.9 %
MCH RBC QN AUTO: 27.2 PG (ref 27–33)
MCHC RBC AUTO-ENTMCNC: 32.2 G/DL (ref 32–36)
MCV RBC AUTO: 84.7 FL (ref 80–100)
MONOCYTES # BLD AUTO: 590 CELLS/UL (ref 200–950)
MONOCYTES NFR BLD AUTO: 8.8 %
NEUTROPHILS # BLD AUTO: 3585 CELLS/UL (ref 1500–7800)
NEUTROPHILS NFR BLD AUTO: 53.5 %
NONHDLC SERPL-MCNC: 151 MG/DL (CALC)
PLATELET # BLD AUTO: 255 THOUSAND/UL (ref 140–400)
PMV BLD REES-ECKER: 10.2 FL (ref 7.5–12.5)
POTASSIUM SERPL-SCNC: 4.1 MMOL/L (ref 3.5–5.3)
PROT SERPL-MCNC: 6.4 G/DL (ref 6.1–8.1)
RBC # BLD AUTO: 4.7 MILLION/UL (ref 3.8–5.1)
SL AMB EGFR AFRICAN AMERICAN: 90 ML/MIN/1.73M2
SL AMB EGFR NON AFRICAN AMERICAN: 77 ML/MIN/1.73M2
SODIUM SERPL-SCNC: 142 MMOL/L (ref 135–146)
TRIGL SERPL-MCNC: 66 MG/DL
WBC # BLD AUTO: 6.7 THOUSAND/UL (ref 3.8–10.8)

## 2021-08-09 ENCOUNTER — ANNUAL EXAM (OUTPATIENT)
Dept: OBGYN CLINIC | Facility: CLINIC | Age: 61
End: 2021-08-09
Payer: COMMERCIAL

## 2021-08-09 VITALS
SYSTOLIC BLOOD PRESSURE: 130 MMHG | HEIGHT: 58 IN | BODY MASS INDEX: 35.68 KG/M2 | WEIGHT: 170 LBS | DIASTOLIC BLOOD PRESSURE: 80 MMHG

## 2021-08-09 DIAGNOSIS — R87.610 ATYPICAL SQUAMOUS CELL CHANGES OF UNDETERMINED SIGNIFICANCE (ASCUS) ON CERVICAL CYTOLOGY WITH NEGATIVE HIGH RISK HUMAN PAPILLOMA VIRUS (HPV) TEST RESULT: Primary | ICD-10-CM

## 2021-08-09 PROCEDURE — 1036F TOBACCO NON-USER: CPT | Performed by: OBSTETRICS & GYNECOLOGY

## 2021-08-09 PROCEDURE — 99213 OFFICE O/P EST LOW 20 MIN: CPT | Performed by: OBSTETRICS & GYNECOLOGY

## 2021-08-09 PROCEDURE — 88175 CYTOPATH C/V AUTO FLUID REDO: CPT | Performed by: OBSTETRICS & GYNECOLOGY

## 2021-08-09 NOTE — PROGRESS NOTES
Patient returns to the office for repeat Pap smear  Patient has a history of LEEP procedure and laser for ANDREAS 1 and VAIN 2 by gyn oncologist June 2020  Her last Pap smear February 2021 was ASCUS with negative HPV  Patient denies any pelvic pain or bleeding  Physical exam:  External genitalia:  Atrophy  No lesions noted  Vagina clear  Cervix no lesions  Pap smear done  Impression:  Ascus with negative HPV  History of ANDREAS 1 and VAIN 2      Plan:  Check Pap smear

## 2021-08-09 NOTE — PROGRESS NOTES
The patient is here for a repeat pap smear  No bleeding or cramping  The patient has no new concerns

## 2021-08-16 LAB
LAB AP GYN PRIMARY INTERPRETATION: NORMAL
Lab: NORMAL

## 2021-08-23 ENCOUNTER — HOSPITAL ENCOUNTER (OUTPATIENT)
Dept: MAMMOGRAPHY | Facility: HOSPITAL | Age: 61
Discharge: HOME | End: 2021-08-23
Attending: OBSTETRICS & GYNECOLOGY
Payer: COMMERCIAL

## 2021-08-23 VITALS — BODY MASS INDEX: 35.68 KG/M2 | HEIGHT: 58 IN | WEIGHT: 170 LBS

## 2021-08-23 DIAGNOSIS — Z12.31 ENCOUNTER FOR SCREENING MAMMOGRAM FOR BREAST CANCER: ICD-10-CM

## 2021-08-23 PROCEDURE — 77063 BREAST TOMOSYNTHESIS BI: CPT

## 2021-08-23 PROCEDURE — 77067 SCR MAMMO BI INCL CAD: CPT

## 2021-11-13 ENCOUNTER — IMMUNIZATIONS (OUTPATIENT)
Dept: FAMILY MEDICINE CLINIC | Facility: HOSPITAL | Age: 61
End: 2021-11-13

## 2021-11-13 DIAGNOSIS — Z23 ENCOUNTER FOR IMMUNIZATION: Primary | ICD-10-CM

## 2021-11-13 PROCEDURE — 91300 COVID-19 PFIZER VACC 0.3 ML: CPT

## 2021-11-13 PROCEDURE — 0001A COVID-19 PFIZER VACC 0.3 ML: CPT

## 2021-12-27 ENCOUNTER — TELEPHONE (OUTPATIENT)
Dept: FAMILY MEDICINE CLINIC | Facility: CLINIC | Age: 61
End: 2021-12-27

## 2022-02-14 ENCOUNTER — ANNUAL EXAM (OUTPATIENT)
Dept: OBGYN CLINIC | Facility: CLINIC | Age: 62
End: 2022-02-14
Payer: COMMERCIAL

## 2022-02-14 VITALS
HEIGHT: 58 IN | SYSTOLIC BLOOD PRESSURE: 144 MMHG | DIASTOLIC BLOOD PRESSURE: 80 MMHG | BODY MASS INDEX: 33.17 KG/M2 | WEIGHT: 158 LBS

## 2022-02-14 DIAGNOSIS — Z01.419 ENCOUNTER FOR ANNUAL ROUTINE GYNECOLOGICAL EXAMINATION: ICD-10-CM

## 2022-02-14 DIAGNOSIS — R87.610 ATYPICAL SQUAMOUS CELL CHANGES OF UNDETERMINED SIGNIFICANCE (ASCUS) ON CERVICAL CYTOLOGY WITH NEGATIVE HIGH RISK HUMAN PAPILLOMA VIRUS (HPV) TEST RESULT: ICD-10-CM

## 2022-02-14 DIAGNOSIS — Z12.31 ENCOUNTER FOR SCREENING MAMMOGRAM FOR BREAST CANCER: Primary | ICD-10-CM

## 2022-02-14 PROCEDURE — 88175 CYTOPATH C/V AUTO FLUID REDO: CPT | Performed by: OBSTETRICS & GYNECOLOGY

## 2022-02-14 PROCEDURE — S0612 ANNUAL GYNECOLOGICAL EXAMINA: HCPCS | Performed by: OBSTETRICS & GYNECOLOGY

## 2022-02-14 NOTE — PROGRESS NOTES
The patient is here for a yearly  Pap normal 8/9/21,  Pap ASCUS HPV neg 2/8/21, pap LSIL 2/6/20, pap normal 9/9/19, pap LGSIL HPV neg 1/23/19  No bleeding or cramping  No vaginal, bowel, bladder or breast problems

## 2022-02-14 NOTE — PROGRESS NOTES
Assessment/Plan:    Breast and gyn exam  Normal mammogram August 2021  DEXA scan 2020 showing osteopenia   Colonoscopy with polyps June 2020 due for repeat at 5 years  Normal Pap smear 2021, history of abnormal Pap smears  Fluid COVID vaccine with booster    Plan:  Check Pap smear  Rx mammogram   Continue healthy diet exercise  Continue multivitamins and extra vitamin-D 3  Continue weight-bearing exercise and calcium  Subjective: G0     Patient ID: Ricardo Lew is a 57 y o  female presents for yearly exam with no complaints  Patient denies any pelvic pain vaginal bleeding breast bowel or bladder issues  No change in family history  Mother (colon cancer)  Medications reviewed  Review of Systems   Constitutional: Negative  Negative for fatigue, fever and unexpected weight change  HENT: Negative  Eyes: Negative  Respiratory: Negative  Negative for chest tightness, shortness of breath, wheezing and stridor  Cardiovascular: Negative  Negative for chest pain, palpitations and leg swelling  Gastrointestinal: Negative  Negative for abdominal pain, blood in stool, diarrhea, nausea, rectal pain and vomiting  Endocrine: Negative  Genitourinary: Negative for dysuria, frequency, vaginal bleeding, vaginal discharge and vaginal pain  Musculoskeletal: Negative  Skin: Negative  Allergic/Immunologic: Negative  Neurological: Negative  Hematological: Negative  Psychiatric/Behavioral: Negative  All other systems reviewed and are negative  Objective:      /80   Ht 4' 10" (1 473 m)   Wt 71 7 kg (158 lb)   LMP  (LMP Unknown)   BMI 33 02 kg/m²          Physical Exam  Constitutional:       Appearance: She is well-developed  HENT:      Head: Normocephalic and atraumatic  Neck:      Thyroid: No thyromegaly  Trachea: No tracheal deviation  Cardiovascular:      Rate and Rhythm: Normal rate and regular rhythm  Heart sounds: Normal heart sounds  Pulmonary:      Effort: Pulmonary effort is normal  No respiratory distress  Breath sounds: Normal breath sounds  No stridor  No wheezing or rales  Chest:      Chest wall: No tenderness  Breasts: Breasts are symmetrical       Right: No inverted nipple, mass, nipple discharge, skin change or tenderness  Left: No inverted nipple, mass, nipple discharge, skin change or tenderness  Abdominal:      General: Bowel sounds are normal  There is no distension  Palpations: Abdomen is soft  There is no mass  Tenderness: There is no abdominal tenderness  There is no guarding or rebound  Hernia: No hernia is present  There is no hernia in the left inguinal area  Genitourinary:     Labia:         Right: No rash, tenderness, lesion or injury  Left: No rash, tenderness, lesion or injury  Vagina: Normal  No signs of injury and foreign body  No vaginal discharge, erythema, tenderness or bleeding  Cervix: No cervical motion tenderness, discharge or friability  Uterus: Not deviated, not enlarged, not fixed and not tender  Adnexa:         Right: No mass, tenderness or fullness  Left: No mass, tenderness or fullness  Rectum: No mass, anal fissure, external hemorrhoid or internal hemorrhoid  Comments: Normal urethra and [place]'s glands  No uterine prolapse  No cystocele or rectocele  Musculoskeletal:      Cervical back: Normal range of motion and neck supple  Lymphadenopathy:      Lower Body: No right inguinal adenopathy  No left inguinal adenopathy  Skin:     General: Skin is warm and dry  Neurological:      Mental Status: She is alert and oriented to person, place, and time  Psychiatric:         Behavior: Behavior normal          Thought Content:  Thought content normal          Judgment: Judgment normal

## 2022-02-21 LAB
LAB AP GYN PRIMARY INTERPRETATION: NORMAL
Lab: NORMAL

## 2022-03-29 DIAGNOSIS — E78.00 HYPERCHOLESTEROLEMIA: ICD-10-CM

## 2022-03-29 RX ORDER — ROSUVASTATIN CALCIUM 20 MG/1
20 TABLET, COATED ORAL DAILY
Qty: 90 TABLET | Refills: 3 | Status: SHIPPED | OUTPATIENT
Start: 2022-03-29 | End: 2022-04-11

## 2022-04-10 DIAGNOSIS — E78.00 HYPERCHOLESTEROLEMIA: ICD-10-CM

## 2022-04-11 RX ORDER — ROSUVASTATIN CALCIUM 20 MG/1
TABLET, COATED ORAL
Qty: 90 TABLET | Refills: 3 | Status: SHIPPED | OUTPATIENT
Start: 2022-04-11

## 2022-04-19 ENCOUNTER — RA CDI HCC (OUTPATIENT)
Dept: OTHER | Facility: HOSPITAL | Age: 62
End: 2022-04-19

## 2022-04-19 NOTE — PROGRESS NOTES
[facility] 75  coding opportunities       Chart reviewed, no opportunity found: CHART REVIEWED, NO OPPORTUNITY FOUND        Patients Insurance        Commercial Insurance: [employer]

## 2022-04-26 ENCOUNTER — OFFICE VISIT (OUTPATIENT)
Dept: FAMILY MEDICINE CLINIC | Facility: CLINIC | Age: 62
End: 2022-04-26
Payer: COMMERCIAL

## 2022-04-26 VITALS
HEART RATE: 74 BPM | BODY MASS INDEX: 32.54 KG/M2 | WEIGHT: 155 LBS | TEMPERATURE: 98.2 F | HEIGHT: 58 IN | DIASTOLIC BLOOD PRESSURE: 80 MMHG | SYSTOLIC BLOOD PRESSURE: 120 MMHG

## 2022-04-26 DIAGNOSIS — M85.80 OSTEOPENIA, UNSPECIFIED LOCATION: ICD-10-CM

## 2022-04-26 DIAGNOSIS — J45.20 MILD INTERMITTENT ASTHMA WITHOUT COMPLICATION: ICD-10-CM

## 2022-04-26 DIAGNOSIS — E89.40 ASYMPTOMATIC POSTSURGICAL MENOPAUSE: ICD-10-CM

## 2022-04-26 DIAGNOSIS — Z13.83 SCREENING FOR CARDIOVASCULAR, RESPIRATORY, AND GENITOURINARY DISEASES: ICD-10-CM

## 2022-04-26 DIAGNOSIS — Z13.89 SCREENING FOR CARDIOVASCULAR, RESPIRATORY, AND GENITOURINARY DISEASES: ICD-10-CM

## 2022-04-26 DIAGNOSIS — Z13.6 SCREENING FOR CARDIOVASCULAR, RESPIRATORY, AND GENITOURINARY DISEASES: ICD-10-CM

## 2022-04-26 DIAGNOSIS — E78.2 MIXED HYPERLIPIDEMIA: ICD-10-CM

## 2022-04-26 DIAGNOSIS — Z00.00 ANNUAL PHYSICAL EXAM: Primary | ICD-10-CM

## 2022-04-26 PROCEDURE — 1036F TOBACCO NON-USER: CPT | Performed by: FAMILY MEDICINE

## 2022-04-26 PROCEDURE — 99396 PREV VISIT EST AGE 40-64: CPT | Performed by: FAMILY MEDICINE

## 2022-04-26 PROCEDURE — 3008F BODY MASS INDEX DOCD: CPT | Performed by: FAMILY MEDICINE

## 2022-04-26 PROCEDURE — 3725F SCREEN DEPRESSION PERFORMED: CPT | Performed by: FAMILY MEDICINE

## 2022-04-26 NOTE — PATIENT INSTRUCTIONS

## 2022-04-26 NOTE — PROGRESS NOTES
ADULT ANNUAL [address]    NAME: Paxton Ramon  AGE: 57 y o  SEX: female  : 1960     DATE: 2022     Assessment and Plan:     Problem List Items Addressed This Visit        Respiratory    Asthma     Stable on present care  Continue to monitor  Recheck 1y         Relevant Orders    CBC and differential       Musculoskeletal and Integument    Osteopenia     Recheck dexa scan  Continue Vit D, Calcium and weight bearing exercise  Recheck 1y or prn         Relevant Orders    DXA bone density spine hip and pelvis       Other    Hyperlipidemia    Relevant Orders    Comprehensive metabolic panel    Lipid panel      Other Visit Diagnoses     Annual physical exam    -  Primary    Asymptomatic postsurgical menopause        Relevant Orders    DXA bone density spine hip and pelvis    Screening for cardiovascular, respiratory, and genitourinary diseases              Immunizations and preventive care screenings were discussed with patient today  Appropriate education was printed on patient's after visit summary  Counseling:  Exercise: the importance of regular exercise/physical activity was discussed  Recommend exercise 3-5 times per week for at least 30 minutes  BMI Counseling: Body mass index is 32 4 kg/m²  The BMI is above normal  Nutrition recommendations include moderation in carbohydrate intake, increasing intake of lean protein, reducing intake of saturated fat and trans fat and reducing intake of cholesterol  Return in about 1 year (around 2023)  Chief Complaint:     Chief Complaint   Patient presents with    Physical Exam      History of Present Illness:     Adult Annual Physical   Patient here for a comprehensive physical exam  The patient reports no problems  Diet and Physical Activity  Diet/Nutrition: well balanced diet  Exercise: walking, 5-7 times a week on average and less than 30 minutes on average  Depression Screening  PHQ-2/9 Depression Screening    Little interest or pleasure in doing things: 0 - not at all  Feeling down, depressed, or hopeless: 0 - not at all  PHQ-2 Score: 0  PHQ-2 Interpretation: Negative depression screen       General Health  Sleep: gets 7-8 hours of sleep on average  Hearing: normal - bilateral   Vision: no vision problems and most recent eye exam <1 year ago  Dental: regular dental visits and brushes teeth twice daily  /GYN Health  Patient is: postmenopausal  Last menstrual period: age 46       Review of Systems:     Review of Systems   Constitutional: Negative  HENT: Negative  Eyes: Negative  Respiratory: Negative  Cardiovascular: Negative  Gastrointestinal: Negative  Endocrine: Negative  Genitourinary: Negative  Musculoskeletal: Negative  Skin: Negative  Allergic/Immunologic: Negative  Neurological: Negative  Hematological: Negative  Psychiatric/Behavioral: Negative         Past Medical History:     Past Medical History:   Diagnosis Date    Asthma     Hyperlipidemia     LGSIL on Pap smear of cervix     Papanicolaou smear 01/23/2019    low grade squamous cell lesion    Post-menopausal 2013    Precancerous lesion       Past Surgical History:     Past Surgical History:   Procedure Laterality Date    COLONOSCOPY  2008    one polyp    MAMMO (HISTORICAL) Bilateral 08/09/2018    no evidence of malignancy    WI CONIZATION CERVIX,LOOP ELECTRD N/A 7/9/2020    Procedure: LEEP;  Surgeon: Raad Sahni MD;  [facility];  Service: Gynecology Oncology    WI DESTRUCT,VAGINAL LESION(S),SIMPLE N/A 7/9/2020    Procedure: CO2 LASER VAGINA;  Surgeon: Raad Sahni MD;  [facility];  Service: Gynecology Oncology    TOE SURGERY Right     alignment    TOOTH EXTRACTION      WISDOM TOOTH EXTRACTION        Social History:     Social History     Socioeconomic History    Marital status: Single     Spouse name: None    Number of children: None    Years of education: None    Highest education level: None   Occupational History    None   Tobacco Use    Smoking status: Never Smoker    Smokeless tobacco: Never Used   Vaping Use    Vaping Use: Never used   Substance and Sexual Activity    Alcohol use: Yes     Comment: social    Drug use: No    Sexual activity: Yes     Partners: Male     Birth control/protection: Post-menopausal   Other Topics Concern    None   Social History Narrative    BEREAVEMENT    DAILY COFFEE CONSUMPTION (__CUPS/DAY)    DAILY COLA CONSUMPTION (__CANS/DAY)    DAILY TEA CONSUMPTION (__CUPS/DAY)     Social Determinants of Health     Financial Resource Strain: Not on file   Food Insecurity: Not on file   Transportation Needs: Not on file   Physical Activity: Not on file   Stress: Not on file   Social Connections: Not on file   Intimate Partner Violence: Not on file   Housing Stability: Not on file      Family History:     Family History   Problem Relation Age of Onset    Colon cancer Mother     Neurological problems Mother         FAHR'S DISEASE    Hyperlipidemia Mother     Gallbladder disease Mother     Stroke Father     Hypertension Father     Leukemia Maternal Grandmother     Diabetes Paternal Grandmother     No Known Problems Sister     No Known Problems Paternal Aunt       Current Medications:     Current Outpatient Medications   Medication Sig Dispense Refill    Calcium 600 MG tablet Take 2 tablets by mouth daily      fluticasone-salmeterol (ADVAIR DISKUS) 100-50 mcg/dose Inhale 1 puff 2 (two) times a day       loratadine (CLARITIN) 10 mg tablet Take 1 tablet by mouth daily       Multiple Vitamin (MULTIVITAMIN) capsule Take 1 capsule by mouth daily      rosuvastatin (CRESTOR) 20 MG tablet TAKE 1 TABLET DAILY 90 tablet 3    Vitamin D, Cholecalciferol, 400 units TABS Take by mouth       No current facility-administered medications for this visit  Allergies:      Allergies   Allergen Reactions    Dust Mite Extract     Other      Animal dander, trees    Penicillins Hives    Pollen Extract       Physical Exam:     /80   Pulse 74   Temp 98 2 °F (36 8 °C)   Ht 4' 10" (1 473 m)   Wt 70 3 kg (155 lb)   LMP  (LMP Unknown)   BMI 32 40 kg/m²     Physical Exam  Vitals reviewed  Constitutional:       Appearance: She is well-developed  HENT:      Head: Normocephalic and atraumatic  Right Ear: Tympanic membrane, ear canal and external ear normal       Left Ear: Tympanic membrane, ear canal and external ear normal       Mouth/Throat:      Mouth: Mucous membranes are moist    Eyes:      Extraocular Movements: Extraocular movements intact  Conjunctiva/sclera: Conjunctivae normal       Pupils: Pupils are equal, round, and reactive to light  Neck:      Thyroid: No thyromegaly  Vascular: No JVD  Cardiovascular:      Rate and Rhythm: Normal rate and regular rhythm  Pulses: Normal pulses  Heart sounds: Normal heart sounds  No murmur heard  Pulmonary:      Effort: Pulmonary effort is normal       Breath sounds: Normal breath sounds  No wheezing  Abdominal:      General: Bowel sounds are normal  There is no distension  Palpations: Abdomen is soft  There is no mass  Tenderness: There is no abdominal tenderness  Musculoskeletal:         General: No swelling, tenderness or deformity  Normal range of motion  Cervical back: Normal range of motion and neck supple  No muscular tenderness  Right lower leg: No edema  Left lower leg: No edema  Lymphadenopathy:      Cervical: No cervical adenopathy  Skin:     General: Skin is warm  Capillary Refill: Capillary refill takes less than 2 seconds  Neurological:      General: No focal deficit present  Mental Status: She is alert and oriented to person, place, and time  Cranial Nerves: No cranial nerve deficit  Sensory: No sensory deficit        Motor: No weakness or abnormal muscle tone  Coordination: Coordination normal       Gait: Gait normal       Deep Tendon Reflexes: Reflexes normal    Psychiatric:         Mood and Affect: Mood normal          Behavior: Behavior normal          Thought Content:  Thought content normal          Judgment: Judgment normal           MD Kyle Abel

## 2022-05-23 LAB
ALBUMIN SERPL-MCNC: 4 G/DL (ref 3.6–5.1)
ALBUMIN/GLOB SERPL: 1.7 (CALC) (ref 1–2.5)
ALP SERPL-CCNC: 80 U/L (ref 37–153)
ALT SERPL-CCNC: 17 U/L (ref 6–29)
AST SERPL-CCNC: 22 U/L (ref 10–35)
BASOPHILS # BLD AUTO: 31 CELLS/UL (ref 0–200)
BASOPHILS NFR BLD AUTO: 0.5 %
BILIRUB SERPL-MCNC: 0.7 MG/DL (ref 0.2–1.2)
BUN SERPL-MCNC: 16 MG/DL (ref 7–25)
BUN/CREAT SERPL: NORMAL (CALC) (ref 6–22)
CALCIUM SERPL-MCNC: 9 MG/DL (ref 8.6–10.4)
CHLORIDE SERPL-SCNC: 106 MMOL/L (ref 98–110)
CHOLEST SERPL-MCNC: 238 MG/DL
CHOLEST/HDLC SERPL: 4 (CALC)
CO2 SERPL-SCNC: 29 MMOL/L (ref 20–32)
CREAT SERPL-MCNC: 0.7 MG/DL (ref 0.5–0.99)
EOSINOPHIL # BLD AUTO: 174 CELLS/UL (ref 15–500)
EOSINOPHIL NFR BLD AUTO: 2.8 %
ERYTHROCYTE [DISTWIDTH] IN BLOOD BY AUTOMATED COUNT: 13.3 % (ref 11–15)
GLOBULIN SER CALC-MCNC: 2.3 G/DL (CALC) (ref 1.9–3.7)
GLUCOSE SERPL-MCNC: 93 MG/DL (ref 65–99)
HCT VFR BLD AUTO: 38.8 % (ref 35–45)
HDLC SERPL-MCNC: 60 MG/DL
HGB BLD-MCNC: 12.6 G/DL (ref 11.7–15.5)
LDLC SERPL CALC-MCNC: 160 MG/DL (CALC)
LYMPHOCYTES # BLD AUTO: 2418 CELLS/UL (ref 850–3900)
LYMPHOCYTES NFR BLD AUTO: 39 %
MCH RBC QN AUTO: 27 PG (ref 27–33)
MCHC RBC AUTO-ENTMCNC: 32.5 G/DL (ref 32–36)
MCV RBC AUTO: 83.1 FL (ref 80–100)
MONOCYTES # BLD AUTO: 484 CELLS/UL (ref 200–950)
MONOCYTES NFR BLD AUTO: 7.8 %
NEUTROPHILS # BLD AUTO: 3094 CELLS/UL (ref 1500–7800)
NEUTROPHILS NFR BLD AUTO: 49.9 %
NONHDLC SERPL-MCNC: 178 MG/DL (CALC)
PLATELET # BLD AUTO: 258 THOUSAND/UL (ref 140–400)
PMV BLD REES-ECKER: 9.9 FL (ref 7.5–12.5)
POTASSIUM SERPL-SCNC: 4.1 MMOL/L (ref 3.5–5.3)
PROT SERPL-MCNC: 6.3 G/DL (ref 6.1–8.1)
RBC # BLD AUTO: 4.67 MILLION/UL (ref 3.8–5.1)
SL AMB EGFR AFRICAN AMERICAN: 108 ML/MIN/1.73M2
SL AMB EGFR NON AFRICAN AMERICAN: 93 ML/MIN/1.73M2
SODIUM SERPL-SCNC: 141 MMOL/L (ref 135–146)
TRIGL SERPL-MCNC: 76 MG/DL
WBC # BLD AUTO: 6.2 THOUSAND/UL (ref 3.8–10.8)

## 2022-09-06 ENCOUNTER — HOSPITAL ENCOUNTER (OUTPATIENT)
Dept: RADIOLOGY | Age: 62
Discharge: HOME | End: 2022-09-06
Payer: COMMERCIAL

## 2022-09-06 VITALS — BODY MASS INDEX: 32.54 KG/M2 | HEIGHT: 58 IN | WEIGHT: 155 LBS

## 2022-09-06 DIAGNOSIS — Z12.31 ENCOUNTER FOR SCREENING MAMMOGRAM FOR BREAST CANCER: ICD-10-CM

## 2022-09-06 PROCEDURE — 77063 BREAST TOMOSYNTHESIS BI: CPT

## 2022-09-06 PROCEDURE — 77067 SCR MAMMO BI INCL CAD: CPT

## 2022-09-29 ENCOUNTER — HOSPITAL ENCOUNTER (OUTPATIENT)
Dept: RADIOLOGY | Age: 62
Discharge: HOME | End: 2022-09-29

## 2022-09-29 DIAGNOSIS — E89.40 ASYMPTOMATIC POSTSURGICAL MENOPAUSE: ICD-10-CM

## 2022-09-29 DIAGNOSIS — M85.80 OSTEOPENIA, UNSPECIFIED LOCATION: ICD-10-CM

## 2022-10-21 ENCOUNTER — HOSPITAL ENCOUNTER (OUTPATIENT)
Dept: RADIOLOGY | Age: 62
Discharge: HOME | End: 2022-10-21
Payer: COMMERCIAL

## 2022-10-21 PROCEDURE — 77080 DXA BONE DENSITY AXIAL: CPT

## 2022-12-07 LAB
ALBUMIN SERPL-MCNC: 4.1 G/DL (ref 3.6–5.1)
ALBUMIN/GLOB SERPL: 1.7 (CALC) (ref 1–2.5)
ALP SERPL-CCNC: 86 U/L (ref 37–153)
ALT SERPL-CCNC: 13 U/L (ref 6–29)
AST SERPL-CCNC: 20 U/L (ref 10–35)
BASOPHILS # BLD AUTO: 38 CELLS/UL (ref 0–200)
BASOPHILS NFR BLD AUTO: 0.6 %
BILIRUB SERPL-MCNC: 1 MG/DL (ref 0.2–1.2)
BUN SERPL-MCNC: 14 MG/DL (ref 7–25)
BUN/CREAT SERPL: NORMAL (CALC) (ref 6–22)
CALCIUM SERPL-MCNC: 9.4 MG/DL (ref 8.6–10.4)
CHLORIDE SERPL-SCNC: 105 MMOL/L (ref 98–110)
CHOLEST SERPL-MCNC: 231 MG/DL
CHOLEST/HDLC SERPL: 3.8 (CALC)
CO2 SERPL-SCNC: 28 MMOL/L (ref 20–32)
CREAT SERPL-MCNC: 0.81 MG/DL (ref 0.5–1.05)
EOSINOPHIL # BLD AUTO: 227 CELLS/UL (ref 15–500)
EOSINOPHIL NFR BLD AUTO: 3.6 %
ERYTHROCYTE [DISTWIDTH] IN BLOOD BY AUTOMATED COUNT: 13 % (ref 11–15)
GFR/BSA.PRED SERPLBLD CYS-BASED-ARV: 82 ML/MIN/1.73M2
GLOBULIN SER CALC-MCNC: 2.4 G/DL (CALC) (ref 1.9–3.7)
GLUCOSE SERPL-MCNC: 94 MG/DL (ref 65–99)
HCT VFR BLD AUTO: 40.1 % (ref 35–45)
HDLC SERPL-MCNC: 61 MG/DL
HGB BLD-MCNC: 13.2 G/DL (ref 11.7–15.5)
LDLC SERPL CALC-MCNC: 153 MG/DL (CALC)
LYMPHOCYTES # BLD AUTO: 2388 CELLS/UL (ref 850–3900)
LYMPHOCYTES NFR BLD AUTO: 37.9 %
MCH RBC QN AUTO: 27.7 PG (ref 27–33)
MCHC RBC AUTO-ENTMCNC: 32.9 G/DL (ref 32–36)
MCV RBC AUTO: 84.2 FL (ref 80–100)
MONOCYTES # BLD AUTO: 479 CELLS/UL (ref 200–950)
MONOCYTES NFR BLD AUTO: 7.6 %
NEUTROPHILS # BLD AUTO: 3169 CELLS/UL (ref 1500–7800)
NEUTROPHILS NFR BLD AUTO: 50.3 %
NONHDLC SERPL-MCNC: 170 MG/DL (CALC)
PLATELET # BLD AUTO: 251 THOUSAND/UL (ref 140–400)
PMV BLD REES-ECKER: 10.1 FL (ref 7.5–12.5)
POTASSIUM SERPL-SCNC: 4 MMOL/L (ref 3.5–5.3)
PROT SERPL-MCNC: 6.5 G/DL (ref 6.1–8.1)
RBC # BLD AUTO: 4.76 MILLION/UL (ref 3.8–5.1)
SODIUM SERPL-SCNC: 140 MMOL/L (ref 135–146)
TRIGL SERPL-MCNC: 72 MG/DL
WBC # BLD AUTO: 6.3 THOUSAND/UL (ref 3.8–10.8)

## 2023-02-20 ENCOUNTER — ANNUAL EXAM (OUTPATIENT)
Dept: GYNECOLOGY | Facility: CLINIC | Age: 63
End: 2023-02-20

## 2023-02-20 VITALS
SYSTOLIC BLOOD PRESSURE: 122 MMHG | DIASTOLIC BLOOD PRESSURE: 80 MMHG | WEIGHT: 159 LBS | BODY MASS INDEX: 34.3 KG/M2 | HEIGHT: 57 IN

## 2023-02-20 DIAGNOSIS — N95.2 VAGINAL ATROPHY: ICD-10-CM

## 2023-02-20 DIAGNOSIS — Z12.31 SCREENING MAMMOGRAM FOR BREAST CANCER: Primary | ICD-10-CM

## 2023-02-20 DIAGNOSIS — Z87.411 HISTORY OF VAGINAL DYSPLASIA: ICD-10-CM

## 2023-02-20 DIAGNOSIS — Z87.410 HISTORY OF CERVICAL DYSPLASIA: ICD-10-CM

## 2023-02-20 NOTE — PROGRESS NOTES
Assessment/Plan:    Normal breast  Vaginal atrophy  History of VAIN 2 and ANDREAS-1  Normal mammogram September 2022  Next scan showing osteopenia October 2022  Colonoscopy with polyps June 2020 due for repeat at 5 years  COVID-vaccine and booster  Flu vaccine    Plan: Rx mammogram   Check Pap smear  Recommend healthy diet and weightbearing exercise  Subjective: G0     Patient ID: Rod Bates is a 60 y o  female presents for annual exam with no complaints  Denies any pelvic pain vaginal bleeding  Is only not sexually active  Denies any breast bowel or bladder issues  No change in family history  Mother (colon cancer)  Medications reviewed  Review of Systems   Constitutional: Negative  Negative for fatigue, fever and unexpected weight change  HENT: Negative  Eyes: Negative  Respiratory: Negative  Negative for chest tightness, shortness of breath, wheezing and stridor  Cardiovascular: Negative  Negative for chest pain, palpitations and leg swelling  Gastrointestinal: Negative  Negative for abdominal pain, blood in stool, diarrhea, nausea, rectal pain and vomiting  Endocrine: Negative  Genitourinary: Negative for dysuria, frequency, vaginal bleeding, vaginal discharge and vaginal pain  Musculoskeletal: Negative  Skin: Negative  Allergic/Immunologic: Negative  Neurological: Negative  Hematological: Negative  Psychiatric/Behavioral: Negative  All other systems reviewed and are negative  Objective:      /80   Ht 4' 9" (1 448 m)   Wt 72 1 kg (159 lb)   LMP  (LMP Unknown)   BMI 34 41 kg/m²          Physical Exam  Constitutional:       Appearance: She is well-developed  HENT:      Head: Normocephalic and atraumatic  Neck:      Thyroid: No thyromegaly  Trachea: No tracheal deviation  Cardiovascular:      Rate and Rhythm: Normal rate and regular rhythm  Heart sounds: Normal heart sounds     Pulmonary:      Effort: Pulmonary effort is normal  No respiratory distress  Breath sounds: Normal breath sounds  No stridor  No wheezing or rales  Chest:      Chest wall: No tenderness  Breasts:     Breasts are symmetrical       Right: No inverted nipple, mass, nipple discharge, skin change or tenderness  Left: No inverted nipple, mass, nipple discharge, skin change or tenderness  Abdominal:      General: Bowel sounds are normal  There is no distension  Palpations: Abdomen is soft  There is no mass  Tenderness: There is no abdominal tenderness  There is no guarding or rebound  Hernia: No hernia is present  There is no hernia in the left inguinal area  Genitourinary:     Labia:         Right: No rash, tenderness, lesion or injury  Left: No rash, tenderness, lesion or injury  Vagina: Normal  No signs of injury and foreign body  No vaginal discharge, erythema, tenderness or bleeding  Cervix: No cervical motion tenderness, discharge or friability  Uterus: Not deviated, not enlarged, not fixed and not tender  Adnexa:         Right: No mass, tenderness or fullness  Left: No mass, tenderness or fullness  Rectum: No mass, anal fissure, external hemorrhoid or internal hemorrhoid  Comments: Vaginal atrophy  Normal urethra and [place]'s glands  No cystocele rectocele or uterine prolapse  Musculoskeletal:      Cervical back: Normal range of motion and neck supple  Lymphadenopathy:      Lower Body: No right inguinal adenopathy  No left inguinal adenopathy  Skin:     General: Skin is warm and dry  Neurological:      Mental Status: She is alert and oriented to person, place, and time  Psychiatric:         Behavior: Behavior normal          Thought Content:  Thought content normal          Judgment: Judgment normal

## 2023-02-28 LAB
LAB AP GYN PRIMARY INTERPRETATION: NORMAL
Lab: NORMAL

## 2023-03-27 DIAGNOSIS — E78.00 HYPERCHOLESTEROLEMIA: ICD-10-CM

## 2023-03-27 RX ORDER — ROSUVASTATIN CALCIUM 20 MG/1
20 TABLET, COATED ORAL DAILY
Qty: 90 TABLET | Refills: 3 | Status: SHIPPED | OUTPATIENT
Start: 2023-03-27

## 2023-04-26 NOTE — PROGRESS NOTES
[address]    NAME: Dorota Casanova  AGE: 60 y o  SEX: female  : 1960     DATE: 2023     Assessment and Plan:     Problem List Items Addressed This Visit        Respiratory    Asthma    Relevant Medications    Ventolin  (90 Base) MCG/ACT inhaler    Other Relevant Orders    CBC and differential       Musculoskeletal and Integument    Skin neoplasm     ? irritated SK vs SCC  F/u 2w for shave excision        Other Visit Diagnoses     Annual physical exam    -  Primary    Immunization due        Relevant Orders    Pneumococcal Conjugate Vaccine 20-valent (Pcv20) (Completed)    Zoster Vaccine Recombinant IM (Completed)    Screening for cardiovascular, respiratory, and genitourinary diseases        Relevant Orders    Comprehensive metabolic panel    Lipid panel          Immunizations and preventive care screenings were discussed with patient today  Appropriate education was printed on patient's after visit summary  Counseling:  · {Annual Physical; Counselin}         Return for  for MS at 11am (30min),  1 rowan for PE  Chief Complaint:     Chief Complaint   Patient presents with   • Annual Exam   • mole on knee       History of Present Illness:     Adult Annual Physical   Patient here for a comprehensive physical exam  The patient reports {problems:48122}  Diet and Physical Activity  · Diet/Nutrition: {annual physical; diet:02553096}  · Exercise: {annual physical; exercise:2102}  Depression Screening  PHQ-2/9 Depression Screening    Little interest or pleasure in doing things: 0 - not at all  Feeling down, depressed, or hopeless: 0 - not at all  PHQ-2 Score: 0  PHQ-2 Interpretation: Negative depression screen       General Health  · Sleep: {annual physical; sleep:2102}  · Hearing: {annual physical; hearin}  · Vision: {annual physical; vision:}     · Dental: {annual physical; dental:51543878}  /GYN Health  · Patient is: {Menopause:59871}  · Last menstrual period: ***  · Contraceptive method: {contraceptive options:34104}  Review of Systems:     Review of Systems   Past Medical History:     Past Medical History:   Diagnosis Date   • Asthma    • Hyperlipidemia    • LGSIL on Pap smear of cervix    • Papanicolaou smear 01/23/2019    low grade squamous cell lesion   • Post-menopausal 2013   • Precancerous lesion       Past Surgical History:     Past Surgical History:   Procedure Laterality Date   • COLONOSCOPY  2008    one polyp   • COLONOSCOPY W/ POLYPECTOMY  06/11/2020    benign polyps-repeat in 5 years   • MAMMO (HISTORICAL) Bilateral 08/09/2018    no evidence of malignancy   • MO CONIZATION CERVIX W/WO D&C RPR ELTRD EXC N/A 07/09/2020    Procedure: LEEP;  Surgeon: Louis Faulkner MD;  [facility];  Service: Gynecology Oncology   • MO DESTRUCTION VAGINAL LESIONS SIMPLE N/A 07/09/2020    Procedure: CO2 LASER VAGINA;  Surgeon: Louis Faulkner MD;  [facility];  Service: Gynecology Oncology   • TOE SURGERY Right     alignment   • TOOTH EXTRACTION     • WISDOM TOOTH EXTRACTION        Social History:     Social History     Socioeconomic History   • Marital status: Single     Spouse name: None   • Number of children: None   • Years of education: None   • Highest education level: None   Occupational History   • None   Tobacco Use   • Smoking status: Never     Passive exposure: Never   • Smokeless tobacco: Never   Vaping Use   • Vaping Use: Never used   Substance and Sexual Activity   • Alcohol use:  Yes     Alcohol/week: 1 0 standard drink     Types: 1 Glasses of wine per week     Comment: social   • Drug use: No   • Sexual activity: Not Currently     Partners: Male     Birth control/protection: Post-menopausal   Other Topics Concern   • None   Social History Narrative    BEREAVEMENT    DAILY COFFEE CONSUMPTION (__CUPS/DAY)    DAILY COLA "CONSUMPTION (__CANS/DAY)    DAILY TEA CONSUMPTION (__CUPS/DAY)     Social Determinants of Health     Financial Resource Strain: Not on file   Food Insecurity: Not on file   Transportation Needs: Not on file   Physical Activity: Not on file   Stress: Not on file   Social Connections: Not on file   Intimate Partner Violence: Not on file   Housing Stability: Not on file      Family History:     Family History   Problem Relation Age of Onset   • Colon cancer Mother    • Neurological problems Mother         FAHR'S DISEASE   • Hyperlipidemia Mother    • Gallbladder disease Mother    • Stroke Father    • Hypertension Father    • No Known Problems Sister    • Leukemia Maternal Grandmother    • Diabetes Paternal Grandmother    • No Known Problems Paternal Aunt    • Breast cancer Neg Hx       Current Medications:     Current Outpatient Medications   Medication Sig Dispense Refill   • Calcium 600 MG tablet Take 2 tablets by mouth daily     • fluticasone-salmeterol (Advair) 100-50 mcg/dose inhaler Inhale 1 puff 2 (two) times a day      • loratadine (CLARITIN) 10 mg tablet Take 1 tablet by mouth daily      • MAGNESIUM PO Take 1 tablet by mouth in the morning     • Multiple Vitamin (MULTIVITAMIN) capsule Take 1 capsule by mouth daily     • rosuvastatin (CRESTOR) 20 MG tablet Take 1 tablet (20 mg total) by mouth daily 90 tablet 3   • Ventolin  (90 Base) MCG/ACT inhaler as needed     • Vitamin D, Cholecalciferol, 400 units TABS Take by mouth       No current facility-administered medications for this visit  Allergies:      Allergies   Allergen Reactions   • Dust Mite Extract    • Other      Animal dander, trees   • Penicillins Hives   • Pollen Extract       Physical Exam:     /70   Pulse 70   Temp 97 9 °F (36 6 °C)   Ht 4' 9\" (1 448 m)   Wt 72 1 kg (159 lb)   LMP  (LMP Unknown)   SpO2 98%   BMI 34 41 kg/m²     Physical Exam     Jaylin Hanna MD  [facility]  "

## 2023-04-28 ENCOUNTER — OFFICE VISIT (OUTPATIENT)
Dept: FAMILY MEDICINE CLINIC | Facility: CLINIC | Age: 63
End: 2023-04-28

## 2023-04-28 VITALS
WEIGHT: 159 LBS | SYSTOLIC BLOOD PRESSURE: 118 MMHG | HEIGHT: 57 IN | OXYGEN SATURATION: 98 % | DIASTOLIC BLOOD PRESSURE: 70 MMHG | TEMPERATURE: 97.9 F | BODY MASS INDEX: 34.3 KG/M2 | HEART RATE: 70 BPM

## 2023-04-28 DIAGNOSIS — Z13.83 SCREENING FOR CARDIOVASCULAR, RESPIRATORY, AND GENITOURINARY DISEASES: ICD-10-CM

## 2023-04-28 DIAGNOSIS — Z13.89 SCREENING FOR CARDIOVASCULAR, RESPIRATORY, AND GENITOURINARY DISEASES: ICD-10-CM

## 2023-04-28 DIAGNOSIS — D49.2 SKIN NEOPLASM: ICD-10-CM

## 2023-04-28 DIAGNOSIS — Z00.00 ANNUAL PHYSICAL EXAM: Primary | ICD-10-CM

## 2023-04-28 DIAGNOSIS — Z13.6 SCREENING FOR CARDIOVASCULAR, RESPIRATORY, AND GENITOURINARY DISEASES: ICD-10-CM

## 2023-04-28 DIAGNOSIS — J45.20 MILD INTERMITTENT ASTHMA WITHOUT COMPLICATION: ICD-10-CM

## 2023-04-28 DIAGNOSIS — Z23 IMMUNIZATION DUE: ICD-10-CM

## 2023-04-28 RX ORDER — ALBUTEROL SULFATE 90 UG/1
AEROSOL, METERED RESPIRATORY (INHALATION) AS NEEDED
COMMUNITY
Start: 2023-03-13

## 2023-04-28 NOTE — PROGRESS NOTES
[address]    NAME: Carlie Jeffrey  AGE: 60 y o  SEX: female  : 1960     DATE: 2023     Assessment and Plan:     Problem List Items Addressed This Visit        Respiratory    Asthma    Relevant Medications    Ventolin  (90 Base) MCG/ACT inhaler    Other Relevant Orders    CBC and differential       Musculoskeletal and Integument    Skin neoplasm     ? irritated SK vs SCC  F/u 2w for shave excision        Other Visit Diagnoses     Annual physical exam    -  Primary    Immunization due        Relevant Orders    Pneumococcal Conjugate Vaccine 20-valent (Pcv20) (Completed)    Zoster Vaccine Recombinant IM (Completed)    Screening for cardiovascular, respiratory, and genitourinary diseases        Relevant Orders    Comprehensive metabolic panel    Lipid panel          Immunizations and preventive care screenings were discussed with patient today  Appropriate education was printed on patient's after visit summary  Counseling:  Exercise: the importance of regular exercise/physical activity was discussed  Recommend exercise 3-5 times per week for at least 30 minutes  Return for  for MS at 11am (30min),  1 rowan for PE  Chief Complaint:     Chief Complaint   Patient presents with   • Annual Exam   • mole on knee       History of Present Illness:     Adult Annual Physical   Patient here for a comprehensive physical exam  The patient reports problems - as below  - pt has a mole that she would like checked out    - due for immunizations    Diet and Physical Activity  Diet/Nutrition: well balanced diet  Exercise: walking, 5-7 times a week on average and 30-60 minutes on average        Depression Screening  PHQ-2/9 Depression Screening    Little interest or pleasure in doing things: 0 - not at all  Feeling down, depressed, or hopeless: 0 - not at all  PHQ-2 Score: 0  PHQ-2 Interpretation: Negative depression screen       General Health  Sleep: sleeps well and gets 7-8 hours of sleep on average  Hearing: normal - bilateral   Vision: no vision problems, most recent eye exam <1 year ago and wears glasses  Dental: regular dental visits and brushes teeth twice daily  /GYN Health  Patient is: postmenopausal  Last menstrual period: age 46     Review of Systems:     Review of Systems   Constitutional: Negative  HENT: Negative  Eyes: Negative  Respiratory: Negative  Cardiovascular: Negative  Gastrointestinal: Negative  Endocrine: Negative  Genitourinary: Negative  Musculoskeletal: Negative  Skin:        L knee skin neoplasm   Allergic/Immunologic: Negative  Neurological: Negative  Hematological: Negative  Psychiatric/Behavioral: Negative         Past Medical History:     Past Medical History:   Diagnosis Date   • Asthma    • Hyperlipidemia    • LGSIL on Pap smear of cervix    • Papanicolaou smear 01/23/2019    low grade squamous cell lesion   • Post-menopausal 2013   • Precancerous lesion       Past Surgical History:     Past Surgical History:   Procedure Laterality Date   • COLONOSCOPY  2008    one polyp   • COLONOSCOPY W/ POLYPECTOMY  06/11/2020    benign polyps-repeat in 5 years   • MAMMO (HISTORICAL) Bilateral 08/09/2018    no evidence of malignancy   • AR CONIZATION CERVIX W/WO D&C RPR ELTRD EXC N/A 07/09/2020    Procedure: LEEP;  Surgeon: Jaclyn Moore MD;  [facility];  Service: Gynecology Oncology   • AR DESTRUCTION VAGINAL LESIONS SIMPLE N/A 07/09/2020    Procedure: CO2 LASER VAGINA;  Surgeon: Jaclyn Moore MD;  [facility];  Service: Gynecology Oncology   • TOE SURGERY Right     alignment   • TOOTH EXTRACTION     • WISDOM TOOTH EXTRACTION        Social History:     Social History     Socioeconomic History   • Marital status: Single     Spouse name: None   • Number of children: None   • Years of education: None   • Highest education level: None   Occupational History   • None   Tobacco Use   • Smoking status: Never     Passive exposure: Never   • Smokeless tobacco: Never   Vaping Use   • Vaping Use: Never used   Substance and Sexual Activity   • Alcohol use:  Yes     Alcohol/week: 1 0 standard drink     Types: 1 Glasses of wine per week     Comment: social   • Drug use: No   • Sexual activity: Not Currently     Partners: Male     Birth control/protection: Post-menopausal   Other Topics Concern   • None   Social History Narrative    BEREAVEMENT    DAILY COFFEE CONSUMPTION (__CUPS/DAY)    DAILY COLA CONSUMPTION (__CANS/DAY)    DAILY TEA CONSUMPTION (__CUPS/DAY)     Social Determinants of Health     Financial Resource Strain: Not on file   Food Insecurity: Not on file   Transportation Needs: Not on file   Physical Activity: Not on file   Stress: Not on file   Social Connections: Not on file   Intimate Partner Violence: Not on file   Housing Stability: Not on file      Family History:     Family History   Problem Relation Age of Onset   • Colon cancer Mother    • Neurological problems Mother         FAHR'S DISEASE   • Hyperlipidemia Mother    • Gallbladder disease Mother    • Stroke Father    • Hypertension Father    • No Known Problems Sister    • Leukemia Maternal Grandmother    • Diabetes Paternal Grandmother    • No Known Problems Paternal Aunt    • Breast cancer Neg Hx       Current Medications:     Current Outpatient Medications   Medication Sig Dispense Refill   • Calcium 600 MG tablet Take 2 tablets by mouth daily     • fluticasone-salmeterol (Advair) 100-50 mcg/dose inhaler Inhale 1 puff 2 (two) times a day      • loratadine (CLARITIN) 10 mg tablet Take 1 tablet by mouth daily      • MAGNESIUM PO Take 1 tablet by mouth in the morning     • Multiple Vitamin (MULTIVITAMIN) capsule Take 1 capsule by mouth daily     • rosuvastatin (CRESTOR) 20 MG tablet Take 1 tablet (20 mg total) by mouth daily 90 tablet 3   • Ventolin  (90 Base) MCG/ACT "inhaler as needed     • Vitamin D, Cholecalciferol, 400 units TABS Take by mouth       No current facility-administered medications for this visit  Allergies: Allergies   Allergen Reactions   • Dust Mite Extract    • Other      Animal dander, trees   • Penicillins Hives   • Pollen Extract       Physical Exam:     /70   Pulse 70   Temp 97 9 °F (36 6 °C)   Ht 4' 9\" (1 448 m)   Wt 72 1 kg (159 lb)   LMP  (LMP Unknown)   SpO2 98%   BMI 34 41 kg/m²     Physical Exam  Vitals reviewed  Constitutional:       Appearance: She is well-developed  HENT:      Head: Normocephalic and atraumatic  Right Ear: Tympanic membrane, ear canal and external ear normal       Left Ear: Tympanic membrane, ear canal and external ear normal       Nose: Nose normal       Mouth/Throat:      Mouth: Mucous membranes are moist    Eyes:      Extraocular Movements: Extraocular movements intact  Conjunctiva/sclera: Conjunctivae normal       Pupils: Pupils are equal, round, and reactive to light  Neck:      Thyroid: No thyromegaly  Vascular: No JVD  Cardiovascular:      Rate and Rhythm: Normal rate and regular rhythm  Pulses: Normal pulses  Heart sounds: Normal heart sounds  No murmur heard  Pulmonary:      Effort: Pulmonary effort is normal       Breath sounds: Normal breath sounds  No stridor  No wheezing or rales  Abdominal:      General: Bowel sounds are normal  There is no distension  Palpations: Abdomen is soft  There is no mass  Tenderness: There is no abdominal tenderness  Musculoskeletal:         General: No swelling, tenderness or deformity  Normal range of motion  Cervical back: Normal range of motion and neck supple  No tenderness  No muscular tenderness  Right lower leg: No edema  Left lower leg: No edema  Lymphadenopathy:      Cervical: No cervical adenopathy  Skin:     General: Skin is warm        Capillary Refill: Capillary refill takes less than 2 " seconds  Findings: Lesion (raised, rough, sl elevated sl erythematous lesion just above the L patella) present  Neurological:      General: No focal deficit present  Mental Status: She is alert and oriented to person, place, and time  Cranial Nerves: No cranial nerve deficit  Sensory: No sensory deficit  Motor: No weakness or abnormal muscle tone  Coordination: Coordination normal       Gait: Gait normal       Deep Tendon Reflexes: Reflexes normal    Psychiatric:         Mood and Affect: Mood normal          Behavior: Behavior normal          Thought Content:  Thought content normal          Judgment: Judgment normal           MD Kyle Angel

## 2023-04-28 NOTE — ASSESSMENT & PLAN NOTE
[address] [facility] [address] [place] [address] 
[phone] Patient: Eliezer Morton MRN: [id number] :1987 About your hospitalization You were admitted on:  August 15, 2018 You last received care in the:  [facility] 3N TELEMETRY You were discharged on:  2018 Why you were hospitalized Your primary diagnosis was:  Not on File Your diagnoses also included:  Sob (Shortness Of Breath) Follow-up Information Follow up With Details Comments Contact Info Kristin Reddy NP   [address] [facility] [address] [place] [address] 
[phone] Your Scheduled Appointments 2018  3:00 PM EDT Follow Up with Haydee Marti MD  
[facility] [address] [place] [address] [facility] [address] [id number] [phone] Discharge Orders None A check fahad indicates which time of day the medication should be taken. My Medications CHANGE how you take these medications Instructions Each Dose to Equal  
 Morning Noon Evening Bedtime  
 carvedilol 25 mg tablet Commonly known as:  Jolie Chew What changed:   
- medication strength 
- how much to take Your last dose was: Your next dose is: Take 1 Tab by mouth two (2) times daily (with meals). 25 mg  
    
   
   
   
  
 cloNIDine HCl 0.3 mg tablet Commonly known as:  CATAPRES What changed:   
- medication strength 
- how much to take - when to take this Your last dose was: Your next dose is: Take 1 Tab by mouth three (3) times daily. 0.3 mg  
    
   
   
   
  
  
CONTINUE taking these medications Instructions Each Dose to Equal  
 Morning Noon Evening Bedtime  
 albuterol 90 mcg/actuation inhaler Commonly known as:  PROVENTIL HFA, VENTOLIN HFA, PROAIR HFA Your last dose was: Your next dose is: ?irritated SK vs SCC   F/u 2w for shave excision Take 1-2 Puffs by inhalation every four (4) hours as needed for Wheezing or Shortness of Breath. 1-2 Puff  
    
   
   
   
  
 amitriptyline 25 mg tablet Commonly known as:  ELAVIL Your last dose was: Your next dose is: Take 25 mg by mouth nightly. 25 mg  
    
   
   
   
  
 amLODIPine 10 mg tablet Commonly known as:  Ted Garcia Your last dose was: Your next dose is: Take 1 Tab by mouth daily. 10 mg  
    
   
   
   
  
 apixaban 5 mg tablet Commonly known as:  Gerson Davis Your last dose was: Your next dose is: Take 5 mg by mouth two (2) times a day. Indications: pulmonary thromboembolism 5 mg  
    
   
   
   
  
 azaTHIOprine 50 mg tablet Commonly known as:  The Pepsi Your last dose was: Your next dose is: Take 50 mg by mouth daily (after breakfast). 50 mg  
    
   
   
   
  
 fluticasone-vilanterol 200-25 mcg/dose inhaler Commonly known as:  BREO ELLIPTA Your last dose was: Your next dose is: Take 1 Puff by inhalation daily. Rinse mouth out after use 1 Puff  
    
   
   
   
  
 gemfibrozil 600 mg tablet Commonly known as:  LOPID Your last dose was: Your next dose is: Take 300 mg by mouth daily. 300 mg  
    
   
   
   
  
 oxyCODONE IR 5 mg immediate release tablet Commonly known as:  Earl Jefry Your last dose was: Your next dose is:    
   
   
      
   
   
   
  
 oxyCODONE-acetaminophen 5-325 mg per tablet Commonly known as:  PERCOCET Your last dose was: Your next dose is: Take 1 tablet, two to three times a day, if needed for severe pain. pantoprazole 40 mg tablet Commonly known as:  PROTONIX Your last dose was: Your next dose is: Take 1 Tab by mouth Daily (before breakfast).   
 40 mg  
    
   
   
   
  
 PLAQUENIL 200 mg tablet Generic drug:  hydroxychloroquine Your last dose was: Your next dose is: Take 200 mg by mouth two (2) times a day. 200 mg  
    
   
   
   
  
 polyethylene glycol 17 gram packet Commonly known as:  Efrain Boer Your last dose was: Your next dose is: Take 17 g by mouth daily. 17 g  
    
   
   
   
  
 predniSONE 5 mg tablet Commonly known as:  Sharmila Pound Your last dose was: Your next dose is: Take 2 Tabs by mouth daily. 10 mg Senna 8.6 mg tablet Generic drug:  senna Your last dose was: Your next dose is: Take 1 Tab by mouth daily as needed for Constipation. for constipation 1 Tab STOP taking these medications   
 allopurinol 100 mg tablet Commonly known as:  ZYLOPRIM  
   
  
 HYDROmorphone 2 mg tablet Commonly known as:  DILAUDID  
   
  
 levoFLOXacin 500 mg tablet Commonly known as:  Gertrudis Fiore Where to Get Your Medications Information on where to get these meds will be given to you by the nurse or doctor. ! Ask your nurse or doctor about these medications  
  carvedilol 25 mg tablet  
 cloNIDine HCl 0.3 mg tablet Opioid Education Prescription Opioids: What You Need to Know: 
 
 
ATTENDING PHYSICIAN: Ryan Landaverde MD 
DISCHARGING PROVIDER: Ryan Landaverde MD   
To contact this individual call [phone] and ask the  to page. If unavailable ask to be transferred the Adult Hospitalist Department. DISCHARGE DIAGNOSES Shortness of breath possible due to volume overload ESRD  
HTN Elevated troponin Hx of lupus Hx of PE Chronic pain CONSULTATIONS: IP CONSULT TO NEPHROLOGY 
IP CONSULT TO HOSPITALIST 
 
PROCEDURES/SURGERIES: * No surgery found * PENDING TEST RESULTS:  
At the time of discharge the following test results are still pending: none FOLLOW UP APPOINTMENTS:  
Follow-up Information Follow up With Details Comments Contact Info 1. Dirk Contreras NP In one week  [address] [facility] [address] [place] [address] 
[phone] 2. Continue HD and follow up with Nephrologist 
3. Follow up with your Rheumatologist in 1-2 weeks ADDITIONAL CARE RECOMMENDATIONS:  
 
DIET: Renal Diet ACTIVITY: Activity as tolerated WOUND CARE: none EQUIPMENT needed: none DISCHARGE MEDICATIONS: 
 See Medication Reconciliation Form · It is important that you take the medication exactly as they are prescribed. · Keep your medication in the bottles provided by the pharmacist and keep a list of the medication names, dosages, and times to be taken in your wallet. · Do not take other medications without consulting your doctor. NOTIFY YOUR PHYSICIAN FOR ANY OF THE FOLLOWING:  
Fever over 101 degrees for 24 hours. Chest pain, shortness of breath, fever, chills, nausea, vomiting, diarrhea, change in mentation, falling, weakness, bleeding. Severe pain or pain not relieved by medications. Or, any other signs or symptoms that you may have questions about. DISPOSITION: 
  Home With: 
 OT  PT  HH  RN  
  
 SNF/Inpatient Rehab/LTAC  
x Independent/assisted living Hospice Other: CDMP Checked:  
Yes x PROBLEM LIST Updated: 
Yes x Signed:  
Juma Gamboa MD 
8/20/2018 
8:10 AM 
 
ACO Transitions of Care Introducing [employer] [facility] Ekaterina Krishnamurthy offers a voluntary care coordination program to provide high quality service and care to [facility] fee-for-service beneficiaries. Tylor Millero was designed to help you enhance your health and well-being through the following services: ? Transitions of Care  support for individuals who are transitioning from one care setting to another (example: Hospital to home). ? Chronic and Complex Care Coordination  support for individuals and caregivers of those with serious or chronic illnesses or with more than one chronic (ongoing) condition and those who take a number of different medications. If you meet specific medical criteria, a [address] [place] [address] Rd may call you directly to coordinate your care with your primary care physician and your other care providers. For questions about the [facility] programs, please, contact your physicians office. For general questions or additional information about Accountable Care Organizations: 
Please visit www.medicare.gov/acos. html or call 1-800-MEDICARE (6-598.663.4616) TTY users should call 1-353.886.7169. [employer] Announcement We are excited to announce that we are making your provider's discharge notes available to you in [employer] You will see these notes when they are completed and signed by the physician that discharged you from your recent hospital stay. If you have any questions or concerns about any information you see in [employer] please call the Health Information Department where you were seen or reach out to your Primary Care Provider for more information about your plan of care. Introducing [facility]! [address] introduces [employer] patient portal. Now you can access parts of your medical record, email your doctor's office, and request medication refills online. 1. In your internet browser, go to [email or web address] 2. Click on the First Time User? Click Here link in the Sign In box. You will see the New Member Sign Up page. 3. Enter your [employer] Access Code exactly as it appears below. You will not need to use this code after youve completed the sign-up process. If you do not sign up before the expiration date, you must request a new code. · [employer] Access Code: [email or web address] Expires: 9/1/2018  9:07 PM 
 
4. Enter the last four digits of your Social Security Number (xxxx) and Date of Birth (mm/dd/yyyy) as indicated and click Submit. You will be taken to the next sign-up page. 5. Create a [employer] ID. This will be your [employer] login ID and cannot be changed, so think of one that is secure and easy to remember. 6. Create a [employer] password. You can change your password at any time. 7. Enter your Password Reset Question and Answer. This can be used at a later time if you forget your password. 8. Enter your e-mail address. You will receive e-mail notification when new information is available in [address] 9. Click Sign Up. You can now view and download portions of your medical record. 10. Click the Download Summary menu link to download a portable copy of your medical information. If you have questions, please visit the Frequently Asked Questions section of the [employer] website. Remember, [employer] is NOT to be used for urgent needs. For medical emergencies, dial 911. Now available from your iPhone and Android! Introducing Nito Major As a [facility] patient, I wanted to make you aware of our electronic visit tool called Nito Major. [facility] 24/7 allows you to connect within minutes with a medical provider 24 hours a day, seven days a week via a mobile device or tablet or logging into a secure website from your computer. You can access Nito Major from anywhere in the [place].  
 
A virtual visit might be right for you when you have a simple condition and feel like you just dont want to get out of bed, or cant get away from work for an appointment, when your regular [facility] provider is not available (evenings, weekends or holidays), or when youre out of town and need minor care. Electronic visits cost only $49 and if the [facility] 24/7 provider determines a prescription is needed to treat your condition, one can be electronically transmitted to a nearby pharmacy*. Please take a moment to enroll today if you have not already done so. The enrollment process is free and takes just a few minutes. To enroll, please download the [facility] 24/7 judith to your tablet or phone, or visit [email or web address] to enroll on your computer. And, as an [address] [place] [address] patient with a [employer] account, the results of your visits will be scanned into your electronic medical record and your primary care provider will be able to view the scanned results. We urge you to continue to see your regular [facility] provider for your ongoing medical care. And while your primary care provider may not be the one available when you seek a [facility]ito [employer] virtual visit, the peace of mind you get from getting a real diagnosis real time can be priceless. For more information on [employer]aj[employer], view our Frequently Asked Questions (FAQs) at [email or web address]. Sincerely, 
 
Gemma Handy MD 
Chief Medical Officer [facility] Ekaterina Krishnamurthy *:  certain medications cannot be prescribed via Kiyonpankajfin Providers Seen During Your Hospitalization Provider Specialty Primary office phone Mireya Nicholas DO Emergency Medicine [phone] Ivonne Hammans, MD Hospitalist [phone] Laurie Rivers MD Internal Medicine [phone] Your Primary Care Physician (PCP) Primary Care Physician Office Phone Office Fax Katia Rota [phone] You are allergic to the following Allergen Reactions Compazine (Prochlorperazine Edisylate) Nausea and Vomiting Palpitations Recent Documentation Height Weight Breastfeeding? BMI OB Status Smoking Status 1.651 m 58.1 kg No 21.3 kg/m2 Medically Induced Never Smoker Emergency Contacts Name Discharge Info Relation Home Work Mobile Louis Sparks CAREGIVER [3] Mother [14] [phone] Stew Lockhart CAREGIVER [3] Father [15] [phone] Patient Belongings The following personal items are in your possession at time of discharge: 
  Dental Appliances: None  Visual Aid: None      Home Medications: None   Jewelry: None  Clothing: None    Other Valuables: None Please provide this summary of care documentation to your next provider. Signatures-by signing, you are acknowledging that this After Visit Summary has been reviewed with you and you have received a copy. Patient Signature:  ____________________________________________________________ Date:  ____________________________________________________________  
  
Susana Prieto Provider Signature:  ____________________________________________________________ Date:  ____________________________________________________________

## 2023-05-12 ENCOUNTER — PROCEDURE VISIT (OUTPATIENT)
Dept: FAMILY MEDICINE CLINIC | Facility: CLINIC | Age: 63
End: 2023-05-12

## 2023-05-12 VITALS
WEIGHT: 159.8 LBS | HEART RATE: 70 BPM | OXYGEN SATURATION: 96 % | SYSTOLIC BLOOD PRESSURE: 122 MMHG | TEMPERATURE: 97.6 F | HEIGHT: 57 IN | DIASTOLIC BLOOD PRESSURE: 88 MMHG | BODY MASS INDEX: 34.47 KG/M2

## 2023-05-12 DIAGNOSIS — L98.9 SKIN LESION: Primary | ICD-10-CM

## 2023-05-12 NOTE — PROGRESS NOTES
Name: Saray Greeneville      : 1960      MRN: [id number]  Encounter Provider: Katie Whaley MD  Encounter Date: 2023   Encounter department: [address] [place] [address]     1  Skin lesion  -     Tissue Exam; Future  -     Tissue Exam  -     Shave lesion  Discussion:  I reviewed with pt  Lesion s/p shave excision  I reviewed  Wound care and signs of infection  Recheck prn  Pt to call for problems or concerns in the interim       Subjective     59 yo female here for recheck and possible treatment of L low anterior thigh skin neoplasm  Lesion is raised and firm  (+) sun exposure    Review of Systems   Constitutional: Negative  Musculoskeletal: Negative  Skin:        L anterior knee neoplasm   All other systems reviewed and are negative        Past Medical History:   Diagnosis Date   • Asthma    • Hyperlipidemia    • LGSIL on Pap smear of cervix    • Papanicolaou smear 2019    low grade squamous cell lesion   • Post-menopausal    • Precancerous lesion      Past Surgical History:   Procedure Laterality Date   • COLONOSCOPY      one polyp   • COLONOSCOPY W/ POLYPECTOMY  2020    benign polyps-repeat in 5 years   • MAMMO (HISTORICAL) Bilateral 2018    no evidence of malignancy   • AK CONIZATION CERVIX W/WO D&C RPR ELTRD EXC N/A 2020    Procedure: LEEP;  Surgeon: Phyllis Wilhelm MD;  [facility];  Service: Gynecology Oncology   • AK DESTRUCTION VAGINAL LESIONS SIMPLE N/A 2020    Procedure: CO2 LASER VAGINA;  Surgeon: Phyllis Wilhelm MD;  [facility];  Service: Gynecology Oncology   • TOE SURGERY Right     alignment   • TOOTH EXTRACTION     • WISDOM TOOTH EXTRACTION       Family History   Problem Relation Age of Onset   • Colon cancer Mother    • Neurological problems Mother         FAHR'S DISEASE   • Hyperlipidemia Mother    • Gallbladder disease Mother    • Stroke Father    • Hypertension Father    • No Known Problems Sister    • Leukemia Maternal Grandmother    • Diabetes Paternal Grandmother    • No Known Problems Paternal Aunt    • Breast cancer Neg Hx      Social History     Socioeconomic History   • Marital status: Single     Spouse name: None   • Number of children: None   • Years of education: None   • Highest education level: None   Occupational History   • None   Tobacco Use   • Smoking status: Never     Passive exposure: Never   • Smokeless tobacco: Never   Vaping Use   • Vaping Use: Never used   Substance and Sexual Activity   • Alcohol use:  Yes     Alcohol/week: 1 0 standard drink     Types: 1 Glasses of wine per week     Comment: social   • Drug use: No   • Sexual activity: Not Currently     Partners: Male     Birth control/protection: Post-menopausal   Other Topics Concern   • None   Social History Narrative    BEREAVEMENT    DAILY COFFEE CONSUMPTION (__CUPS/DAY)    DAILY COLA CONSUMPTION (__CANS/DAY)    DAILY TEA CONSUMPTION (__CUPS/DAY)     Social Determinants of Health     Financial Resource Strain: Not on file   Food Insecurity: Not on file   Transportation Needs: Not on file   Physical Activity: Not on file   Stress: Not on file   Social Connections: Not on file   Intimate Partner Violence: Not on file   Housing Stability: Not on file     Current Outpatient Medications on File Prior to Visit   Medication Sig   • Calcium 600 MG tablet Take 2 tablets by mouth daily   • fluticasone-salmeterol (Advair) 100-50 mcg/dose inhaler Inhale 1 puff 2 (two) times a day    • loratadine (CLARITIN) 10 mg tablet Take 1 tablet by mouth daily    • Multiple Vitamin (MULTIVITAMIN) capsule Take 1 capsule by mouth daily   • rosuvastatin (CRESTOR) 20 MG tablet Take 1 tablet (20 mg total) by mouth daily   • Ventolin  (90 Base) MCG/ACT inhaler as needed   • Vitamin D, Cholecalciferol, 400 units TABS Take by mouth   • MAGNESIUM PO Take 1 tablet by mouth in the morning (Patient not taking: Reported on 5/12/2023) "    Allergies   Allergen Reactions   • Dust Mite Extract    • Other      Animal dander, trees   • Penicillins Hives   • Pollen Extract      Immunization History   Administered Date(s) Administered   • COVID-19 PFIZER VACCINE 0 3 ML IM 04/19/2021, 05/11/2021, 11/13/2021   • COVID-19 Pfizer Vac BIVALENT Doug-sucrose 12 Yr+ IM (BOOSTER ONLY) 09/27/2022   • COVID-19 Pfizer vac (Doug-sucrose, gray cap) 12 yr+ IM 06/03/2022   • INFLUENZA 01/24/2020   • Influenza Quadrivalent, 6-35 Months IM 09/14/2015, 10/05/2016, 10/17/2017   • Influenza, recombinant, quadrivalent,injectable, preservative free 01/07/2019, 01/24/2020, 09/15/2020   • Influenza, seasonal, injectable 09/24/2013   • Pneumococcal Conjugate Vaccine 20-valent (Pcv20), Polysace 04/28/2023   • Pneumococcal Polysaccharide PPV23 09/24/2013   • Tdap 07/14/2010   • Zoster Vaccine Recombinant 04/28/2023       Objective     /88 (BP Location: Left arm, Patient Position: Sitting)   Pulse 70   Temp 97 6 °F (36 4 °C)   Ht 4' 9\" (1 448 m)   Wt 72 5 kg (159 lb 12 8 oz)   LMP  (LMP Unknown)   SpO2 96%   BMI 34 58 kg/m²     Physical Exam  Vitals reviewed  Skin:     Capillary Refill: Capillary refill takes less than 2 seconds  Comments: L low anterior thigh raised lesion with crusting, 8mmx 5mm  Neurological:      Mental Status: She is alert  Shave lesion    Date/Time: 5/12/2023 11:00 AM  Performed by: Candelaria Chung MD  Authorized by: Candelaria Chung MD   Universal Protocol:  Consent: Verbal consent obtained  Written consent obtained  Consent given by: patient  Patient understanding: patient states understanding of the procedure being performed  Patient consent: the patient's understanding of the procedure matches consent given  Radiology Images displayed and confirmed   If images not available, report reviewed: imaging studies available      Number of Lesions: 1  Lesion 1:     Body area: lower extremity    Lower extremity location: " L upper leg    Initial size (mm): 8    Final defect size (mm): 10    Malignancy: malignancy unknown      Destruction method: shave removal      Comments:  Area cleaned with Betadine and then anesthetized with small amount of 0 75% Sensorcaine  Shave excision done  Hyfrecation to the base  Sterile dressing applied  Patient tolerated procedure well              Carina Tang MD

## 2023-09-29 ENCOUNTER — HOSPITAL ENCOUNTER (OUTPATIENT)
Dept: RADIOLOGY | Age: 63
Discharge: HOME | End: 2023-09-29
Payer: COMMERCIAL

## 2023-09-29 VITALS — BODY MASS INDEX: 34.52 KG/M2 | WEIGHT: 160 LBS | HEIGHT: 57 IN

## 2023-09-29 DIAGNOSIS — Z12.31 SCREENING MAMMOGRAM FOR BREAST CANCER: ICD-10-CM

## 2023-09-29 PROCEDURE — 77063 BREAST TOMOSYNTHESIS BI: CPT

## 2023-09-29 PROCEDURE — 77067 SCR MAMMO BI INCL CAD: CPT

## 2023-10-31 ENCOUNTER — CLINICAL SUPPORT (OUTPATIENT)
Dept: FAMILY MEDICINE CLINIC | Facility: CLINIC | Age: 63
End: 2023-10-31
Payer: COMMERCIAL

## 2023-10-31 DIAGNOSIS — Z23 ENCOUNTER FOR IMMUNIZATION: Primary | ICD-10-CM

## 2023-10-31 PROCEDURE — 90471 IMMUNIZATION ADMIN: CPT

## 2023-10-31 PROCEDURE — 90750 HZV VACC RECOMBINANT IM: CPT

## 2023-11-22 LAB
ALBUMIN SERPL-MCNC: 4.2 G/DL (ref 3.6–5.1)
ALBUMIN/GLOB SERPL: 1.8 (CALC) (ref 1–2.5)
ALP SERPL-CCNC: 73 U/L (ref 37–153)
ALT SERPL-CCNC: 16 U/L (ref 6–29)
AST SERPL-CCNC: 20 U/L (ref 10–35)
BASOPHILS # BLD AUTO: 37 CELLS/UL (ref 0–200)
BASOPHILS NFR BLD AUTO: 0.6 %
BILIRUB SERPL-MCNC: 0.8 MG/DL (ref 0.2–1.2)
BUN SERPL-MCNC: 18 MG/DL (ref 7–25)
BUN/CREAT SERPL: NORMAL (CALC) (ref 6–22)
CALCIUM SERPL-MCNC: 9.2 MG/DL (ref 8.6–10.4)
CHLORIDE SERPL-SCNC: 106 MMOL/L (ref 98–110)
CHOLEST SERPL-MCNC: 208 MG/DL
CHOLEST/HDLC SERPL: 3.4 (CALC)
CO2 SERPL-SCNC: 27 MMOL/L (ref 20–32)
CREAT SERPL-MCNC: 0.79 MG/DL (ref 0.5–1.05)
EOSINOPHIL # BLD AUTO: 161 CELLS/UL (ref 15–500)
EOSINOPHIL NFR BLD AUTO: 2.6 %
ERYTHROCYTE [DISTWIDTH] IN BLOOD BY AUTOMATED COUNT: 13.4 % (ref 11–15)
GFR/BSA.PRED SERPLBLD CYS-BASED-ARV: 83 ML/MIN/1.73M2
GLOBULIN SER CALC-MCNC: 2.3 G/DL (CALC) (ref 1.9–3.7)
GLUCOSE SERPL-MCNC: 90 MG/DL (ref 65–99)
HCT VFR BLD AUTO: 39 % (ref 35–45)
HDLC SERPL-MCNC: 61 MG/DL
HGB BLD-MCNC: 12.8 G/DL (ref 11.7–15.5)
LDLC SERPL CALC-MCNC: 132 MG/DL (CALC)
LYMPHOCYTES # BLD AUTO: 2275 CELLS/UL (ref 850–3900)
LYMPHOCYTES NFR BLD AUTO: 36.7 %
MCH RBC QN AUTO: 27.8 PG (ref 27–33)
MCHC RBC AUTO-ENTMCNC: 32.8 G/DL (ref 32–36)
MCV RBC AUTO: 84.8 FL (ref 80–100)
MONOCYTES # BLD AUTO: 459 CELLS/UL (ref 200–950)
MONOCYTES NFR BLD AUTO: 7.4 %
NEUTROPHILS # BLD AUTO: 3267 CELLS/UL (ref 1500–7800)
NEUTROPHILS NFR BLD AUTO: 52.7 %
NONHDLC SERPL-MCNC: 147 MG/DL (CALC)
PLATELET # BLD AUTO: 267 THOUSAND/UL (ref 140–400)
PMV BLD REES-ECKER: 10.5 FL (ref 7.5–12.5)
POTASSIUM SERPL-SCNC: 3.9 MMOL/L (ref 3.5–5.3)
PROT SERPL-MCNC: 6.5 G/DL (ref 6.1–8.1)
RBC # BLD AUTO: 4.6 MILLION/UL (ref 3.8–5.1)
SODIUM SERPL-SCNC: 139 MMOL/L (ref 135–146)
TRIGL SERPL-MCNC: 61 MG/DL
WBC # BLD AUTO: 6.2 THOUSAND/UL (ref 3.8–10.8)

## 2024-02-26 ENCOUNTER — ANNUAL EXAM (OUTPATIENT)
Dept: GYNECOLOGY | Facility: CLINIC | Age: 64
End: 2024-02-26
Payer: COMMERCIAL

## 2024-02-26 VITALS
HEIGHT: 57 IN | WEIGHT: 158 LBS | SYSTOLIC BLOOD PRESSURE: 122 MMHG | DIASTOLIC BLOOD PRESSURE: 80 MMHG | BODY MASS INDEX: 34.09 KG/M2

## 2024-02-26 DIAGNOSIS — Z12.31 SCREENING MAMMOGRAM FOR BREAST CANCER: Primary | ICD-10-CM

## 2024-02-26 DIAGNOSIS — Z98.890 HISTORY OF LOOP ELECTRICAL EXCISION PROCEDURE (LEEP): ICD-10-CM

## 2024-02-26 DIAGNOSIS — Z01.419 ENCOUNTER FOR GYNECOLOGICAL EXAMINATION WITHOUT ABNORMAL FINDING: ICD-10-CM

## 2024-02-26 DIAGNOSIS — R87.612 LOW GRADE SQUAMOUS INTRAEPITH LESION ON CYTOLOGIC SMEAR CERVIX (LGSIL): ICD-10-CM

## 2024-02-26 DIAGNOSIS — Z78.0 POSTMENOPAUSAL: ICD-10-CM

## 2024-02-26 DIAGNOSIS — M81.0 OSTEOPOROSIS WITHOUT CURRENT PATHOLOGICAL FRACTURE, UNSPECIFIED OSTEOPOROSIS TYPE: ICD-10-CM

## 2024-02-26 PROCEDURE — S0612 ANNUAL GYNECOLOGICAL EXAMINA: HCPCS | Performed by: OBSTETRICS & GYNECOLOGY

## 2024-02-26 PROCEDURE — 88175 CYTOPATH C/V AUTO FLUID REDO: CPT | Performed by: OBSTETRICS & GYNECOLOGY

## 2024-02-26 NOTE — PROGRESS NOTES
"Assessment/Plan:    Normal breast and GYN exam  History of LEEP for high-grade EDVIN 2020.  Normal Pap smear 2/23.  Normal mammogram September 2023  Colonoscopy with polyps June 2020.  Osteoporosis    Plan: Check Pap smear.  Rx mammogram.  Rx DEXA scan.  Continue healthy diet weightbearing and balancing exercises.    Subjective: G0     Patient ID: Yudi Caba is a 63 y.o. female presents to the office for annual exam with no complaints.  History of a LEEP.  Denies any pelvic pain or vaginal bleeding.  Denies any dyspareunia.  Denies any breast bowel or bladder problems.  No change in family history.  Mother (colon cancer).  Medications reviewed.  Patient just got engaged and will be getting  August 2024.        Review of Systems   Constitutional: Negative.  Negative for fatigue, fever and unexpected weight change.   HENT: Negative.     Eyes: Negative.    Respiratory: Negative.  Negative for chest tightness, shortness of breath, wheezing and stridor.    Cardiovascular: Negative.  Negative for chest pain, palpitations and leg swelling.   Gastrointestinal: Negative.  Negative for abdominal pain, blood in stool, diarrhea, nausea, rectal pain and vomiting.   Endocrine: Negative.    Genitourinary:  Negative for dysuria, frequency, vaginal bleeding, vaginal discharge and vaginal pain.   Musculoskeletal: Negative.    Skin: Negative.    Allergic/Immunologic: Negative.    Neurological: Negative.    Hematological: Negative.    Psychiatric/Behavioral: Negative.     All other systems reviewed and are negative.        Objective:      /80   Ht 4' 9\" (1.448 m)   Wt 71.7 kg (158 lb)   LMP  (LMP Unknown)   BMI 34.19 kg/m²          Physical Exam  Constitutional:       Appearance: She is well-developed.   HENT:      Head: Normocephalic and atraumatic.   Neck:      Thyroid: No thyromegaly.      Trachea: No tracheal deviation.   Cardiovascular:      Rate and Rhythm: Normal rate and regular rhythm.      Heart sounds: " Normal heart sounds.   Pulmonary:      Effort: Pulmonary effort is normal. No respiratory distress.      Breath sounds: Normal breath sounds. No stridor. No wheezing or rales.   Chest:      Chest wall: No tenderness.   Breasts:     Breasts are symmetrical.      Right: No inverted nipple, mass, nipple discharge, skin change or tenderness.      Left: No inverted nipple, mass, nipple discharge, skin change or tenderness.   Abdominal:      General: Bowel sounds are normal. There is no distension.      Palpations: Abdomen is soft. There is no mass.      Tenderness: There is no abdominal tenderness. There is no guarding or rebound.      Hernia: No hernia is present. There is no hernia in the left inguinal area.   Genitourinary:     Labia:         Right: No rash, tenderness, lesion or injury.         Left: No rash, tenderness, lesion or injury.       Vagina: Normal. No signs of injury and foreign body. No vaginal discharge, erythema, tenderness or bleeding.      Cervix: No cervical motion tenderness, discharge or friability.      Uterus: Not deviated, not enlarged, not fixed and not tender.       Adnexa:         Right: No mass, tenderness or fullness.          Left: No mass, tenderness or fullness.        Rectum: No mass, anal fissure, external hemorrhoid or internal hemorrhoid.   Musculoskeletal:      Cervical back: Normal range of motion and neck supple.   Lymphadenopathy:      Lower Body: No right inguinal adenopathy. No left inguinal adenopathy.   Skin:     General: Skin is warm and dry.   Neurological:      Mental Status: She is alert and oriented to person, place, and time.   Psychiatric:         Behavior: Behavior normal.         Thought Content: Thought content normal.         Judgment: Judgment normal.

## 2024-03-02 LAB
LAB AP GYN PRIMARY INTERPRETATION: NORMAL
Lab: NORMAL

## 2024-03-26 DIAGNOSIS — E78.00 HYPERCHOLESTEROLEMIA: ICD-10-CM

## 2024-03-26 NOTE — TELEPHONE ENCOUNTER
Reason for call:   [x] Refill   [] Prior Auth  [] Other:     Office:   [x] PCP/Provider - [facility]   [] Specialty/Provider -     Medication: Crestor     Dose/Frequency: 20mg - daily     Quantity: 90    Pharmacy: [facility]     Does the patient have enough for 3 days?   [x] Yes   [] No - Send as HP to POD

## 2024-03-27 RX ORDER — ROSUVASTATIN CALCIUM 20 MG/1
20 TABLET, COATED ORAL DAILY
Qty: 90 TABLET | Refills: 1 | Status: SHIPPED | OUTPATIENT
Start: 2024-03-27

## 2024-06-10 ENCOUNTER — RA CDI HCC (OUTPATIENT)
Dept: OTHER | Facility: HOSPITAL | Age: 64
End: 2024-06-10

## 2024-06-10 NOTE — PROGRESS NOTES
HCC coding opportunities          Chart Reviewed number of suggestions sent to Provider: 1  J45.20     Patients Insurance        Commercial Insurance: [employer]

## 2024-06-11 ENCOUNTER — OFFICE VISIT (OUTPATIENT)
Dept: FAMILY MEDICINE CLINIC | Facility: CLINIC | Age: 64
End: 2024-06-11
Payer: COMMERCIAL

## 2024-06-11 VITALS
BODY MASS INDEX: 34.82 KG/M2 | TEMPERATURE: 97.5 F | DIASTOLIC BLOOD PRESSURE: 72 MMHG | OXYGEN SATURATION: 99 % | WEIGHT: 161.4 LBS | HEART RATE: 69 BPM | HEIGHT: 57 IN | SYSTOLIC BLOOD PRESSURE: 120 MMHG

## 2024-06-11 DIAGNOSIS — L23.9 ALLERGIC CONTACT DERMATITIS, UNSPECIFIED TRIGGER: Primary | ICD-10-CM

## 2024-06-11 PROCEDURE — 99213 OFFICE O/P EST LOW 20 MIN: CPT | Performed by: FAMILY MEDICINE

## 2024-06-11 RX ORDER — METHYLPREDNISOLONE 4 MG/1
TABLET ORAL
Qty: 21 EACH | Refills: 0 | Status: SHIPPED | OUTPATIENT
Start: 2024-06-11

## 2024-06-11 NOTE — PROGRESS NOTES
Ambulatory Visit  Name: Yudi Caba      : 1960      MRN: [id number]  Encounter Provider: Brice Lucero MD  Encounter Date: 2024   Encounter department: [facility]    Assessment & Plan   1. Allergic contact dermatitis, unspecified trigger  -     methylPREDNISolone 4 MG tablet therapy pack; Use as directed on package     Discussion:  I reviewed with pt.  Unclear if this is related to rhus exposure, or reaction to products she used to avoid rhus dermatitis.  REC: start Medrol. Continue present care. Recheck 1w if not improving - earlier if worse. Pt to call for problems or concerns in the interim      History of Present Illness     63-year-old female was picking known area of poison ivy yesterday.  Last evening, she developed a rash on her right arm, less so on her left arm, and her anterior neck.  Overnight, the right arm and other areas worsened.  She did use a pre and post poison ivy exposure preparation that did not seem to work.  Patient notes some discomfort as well as pruritus.  Patient here for evaluation.      Review of Systems   Constitutional: Negative.    HENT: Negative.     Musculoskeletal: Negative.    Skin:  Positive for rash.   Neurological: Negative.      Past Medical History:   Diagnosis Date   • Asthma    • Hyperlipidemia    • LGSIL on Pap smear of cervix    • Papanicolaou smear 2019    low grade squamous cell lesion   • Post-menopausal 2013   • Precancerous lesion      Past Surgical History:   Procedure Laterality Date   • COLONOSCOPY      one polyp   • COLONOSCOPY W/ POLYPECTOMY  2020    benign polyps-repeat in 5 years   • MAMMO (HISTORICAL) Bilateral 2018    no evidence of malignancy   • MOLE EXCISION     • AZ CONIZATION CERVIX W/WO D&C RPR ELTRD EXC N/A 2020    Procedure: LEEP;  Surgeon: Real Motley MD;  [facility];  Service: Gynecology Oncology   • AZ DESTRUCTION VAGINAL LESIONS SIMPLE N/A  07/09/2020    Procedure: CO2 LASER VAGINA;  Surgeon: Real Motley MD;  [facility];  Service: Gynecology Oncology   • TOE SURGERY Right     alignment   • TOOTH EXTRACTION     • WISDOM TOOTH EXTRACTION       Family History   Problem Relation Age of Onset   • Colon cancer Mother 64   • Neurological problems Mother         FAHR'S DISEASE   • Hyperlipidemia Mother    • Gallbladder disease Mother    • Stroke Father    • Hypertension Father    • No Known Problems Sister    • Leukemia Maternal Grandmother    • No Known Problems Maternal Grandfather    • Diabetes Paternal Grandmother    • No Known Problems Paternal Grandfather    • No Known Problems Paternal Aunt    • Breast cancer Neg Hx      Social History     Tobacco Use   • Smoking status: Never     Passive exposure: Never   • Smokeless tobacco: Never   Vaping Use   • Vaping status: Never Used   Substance and Sexual Activity   • Alcohol use: Yes     Alcohol/week: 1.0 standard drink of alcohol     Types: 1 Glasses of wine per week     Comment: social   • Drug use: No   • Sexual activity: Yes     Partners: Male     Birth control/protection: Post-menopausal     Current Outpatient Medications on File Prior to Visit   Medication Sig   • Calcium 600 MG tablet Take 2 tablets by mouth daily   • fluticasone-salmeterol (Advair) 100-50 mcg/dose inhaler Inhale 1 puff 2 (two) times a day    • loratadine (CLARITIN) 10 mg tablet Take 1 tablet by mouth daily    • Multiple Vitamin (MULTIVITAMIN) capsule Take 1 capsule by mouth daily   • rosuvastatin (CRESTOR) 20 MG tablet Take 1 tablet (20 mg total) by mouth daily   • Ventolin  (90 Base) MCG/ACT inhaler as needed   • Vitamin D, Cholecalciferol, 400 units TABS Take by mouth   • [DISCONTINUED] MAGNESIUM PO Take 1 tablet by mouth in the morning (Patient not taking: Reported on 6/11/2024)     Allergies   Allergen Reactions   • Dust Mite Extract    • Other      Animal dander, trees   • Penicillins Hives   • Pollen Extract  "     Immunization History   Administered Date(s) Administered   • COVID-19 PFIZER VACCINE 0.3 ML IM 04/19/2021, 05/11/2021, 11/13/2021   • COVID-19 Pfizer Vac BIVALENT Doug-sucrose 12 Yr+ IM 09/27/2022   • COVID-19 Pfizer mRNA vacc PF doug-sucrose 12 yr and older (Comirnaty) 11/28/2023   • COVID-19 Pfizer vac (Doug-sucrose, gray cap) 12 yr+ IM 06/03/2022   • INFLUENZA 01/24/2020   • Influenza Quadrivalent, 6-35 Months IM 09/14/2015, 10/05/2016, 10/17/2017   • Influenza, recombinant, quadrivalent,injectable, preservative free 01/07/2019, 01/24/2020, 09/15/2020   • Influenza, seasonal, injectable 09/24/2013   • Pneumococcal Conjugate Vaccine 20-valent (Pcv20), Polysace 04/28/2023   • Pneumococcal Polysaccharide PPV23 09/24/2013   • Tdap 07/14/2010   • Zoster Vaccine Recombinant 04/28/2023, 10/31/2023     Objective     /72 (BP Location: Left arm, Patient Position: Sitting, Cuff Size: Adult)   Pulse 69   Temp 97.5 °F (36.4 °C)   Ht 4' 9\" (1.448 m)   Wt 73.2 kg (161 lb 6.4 oz)   LMP  (LMP Unknown)   SpO2 99%   BMI 34.93 kg/m²     Physical Exam  Vitals reviewed.   Skin:     Findings: Rash (scattered areas of erythema with fine vesicles on arms, neck and few areas of face.) present.   Neurological:      General: No focal deficit present.      Mental Status: She is alert and oriented to person, place, and time.       Administrative Statements         "

## 2024-06-17 ENCOUNTER — OFFICE VISIT (OUTPATIENT)
Dept: FAMILY MEDICINE CLINIC | Facility: CLINIC | Age: 64
End: 2024-06-17
Payer: COMMERCIAL

## 2024-06-17 VITALS
SYSTOLIC BLOOD PRESSURE: 120 MMHG | OXYGEN SATURATION: 98 % | DIASTOLIC BLOOD PRESSURE: 74 MMHG | TEMPERATURE: 97.3 F | HEIGHT: 57 IN | WEIGHT: 158.4 LBS | HEART RATE: 85 BPM | BODY MASS INDEX: 34.17 KG/M2

## 2024-06-17 DIAGNOSIS — E78.2 MIXED HYPERLIPIDEMIA: ICD-10-CM

## 2024-06-17 DIAGNOSIS — J45.20 MILD INTERMITTENT ASTHMA WITHOUT COMPLICATION: ICD-10-CM

## 2024-06-17 DIAGNOSIS — L23.89 ALLERGIC CONTACT DERMATITIS DUE TO OTHER AGENTS: ICD-10-CM

## 2024-06-17 DIAGNOSIS — Z00.00 ANNUAL PHYSICAL EXAM: Primary | ICD-10-CM

## 2024-06-17 PROCEDURE — 99214 OFFICE O/P EST MOD 30 MIN: CPT | Performed by: FAMILY MEDICINE

## 2024-06-17 PROCEDURE — 99396 PREV VISIT EST AGE 40-64: CPT | Performed by: FAMILY MEDICINE

## 2024-06-17 NOTE — PROGRESS NOTES
Adult Annual Physical  Name: Yudi Caba      : 1960      MRN: [id number]  Encounter Provider: Brice Lucero MD  Encounter Date: 2024   Encounter department: [facility]    Assessment & Plan   1. Annual physical exam  2. Mixed hyperlipidemia  Assessment & Plan:  Compliant with rosuvastatin.  Continue present care.  Check labs.  Recheck 1y  Orders:  -     Comprehensive metabolic panel; Future  -     Lipid panel; Future  -     Comprehensive metabolic panel  -     Lipid panel  3. Mild intermittent asthma without complication  Assessment & Plan:  Breathing stable.  Continue present care. Recheck 6m  Orders:  -     CBC and differential; Future  -     Comprehensive metabolic panel; Future  -     CBC and differential  -     Comprehensive metabolic panel  4. Allergic contact dermatitis due to other agents  Assessment & Plan:  I reviewed with pt. Improving but not resolved. Continue to monitor. Recheck 1w if not fully resolved  Immunizations and preventive care screenings were discussed with patient today. Appropriate education was printed on patient's after visit summary.    Counseling:  Exercise: the importance of regular exercise/physical activity was discussed. Recommend exercise 3-5 times per week for at least 30 minutes.          History of Present Illness     Adult Annual Physical:  Patient presents for annual physical.   - hands have improved with medrol.  Still with some erythema but the swellign has resolved  - no other new concerns  - breathing stable.  Uses Advair daily with ventolin prn (rarely needs it).   Up to date with allergist. .     Diet and Physical Activity:  - Diet/Nutrition: well balanced diet.  - Exercise: walking, 5-7 times a week on average and 30-60 minutes on average.    Depression Screening:  - PHQ-2 Score: 0    General Health:  - Sleep: sleeps well.  - Hearing: normal hearing bilateral ears.  - Vision: no vision problems, wears glasses and  most recent eye exam < 1 year ago.  - Dental: regular dental visits and brushes teeth twice daily.    /GYN Health:  - Follows with GYN: yes.   - Menopause: postmenopausal.   - History of STDs: no    Advanced Care Planning:  - Has an advanced directive?: no    - Has a durable medical POA?: no    - ACP document given to patient?: no      Review of Systems   Constitutional: Negative.    HENT: Negative.     Eyes: Negative.    Respiratory: Negative.     Cardiovascular: Negative.    Gastrointestinal: Negative.    Endocrine: Negative.    Genitourinary: Negative.    Musculoskeletal: Negative.    Skin:  Positive for rash. Negative for wound.   Allergic/Immunologic: Negative.    Neurological: Negative.    Hematological: Negative.    Psychiatric/Behavioral: Negative.       Past Medical History   Past Medical History:   Diagnosis Date   • Allergic 1980   • Asthma    • Diverticulitis of colon 2018   • Hyperlipidemia    • LGSIL on Pap smear of cervix    • Papanicolaou smear 01/23/2019    low grade squamous cell lesion   • Post-menopausal 2013   • Precancerous lesion      Past Surgical History:   Procedure Laterality Date   • COLONOSCOPY  2008    one polyp   • COLONOSCOPY W/ POLYPECTOMY  06/11/2020    benign polyps-repeat in 5 years   • MAMMO (HISTORICAL) Bilateral 08/09/2018    no evidence of malignancy   • MOLE EXCISION  2023   • WY CONIZATION CERVIX W/WO D&C RPR ELTRD EXC N/A 07/09/2020    Procedure: LEEP;  Surgeon: Real Motley MD;  Location: [facility];  Service: Gynecology Oncology   • WY DESTRUCTION VAGINAL LESIONS SIMPLE N/A 07/09/2020    Procedure: CO2 LASER VAGINA;  Surgeon: Real Motley MD;  Location: [facility];  Service: Gynecology Oncology   • TOE SURGERY Right     alignment   • TOOTH EXTRACTION     • WISDOM TOOTH EXTRACTION       Family History   Problem Relation Age of Onset   • Colon cancer Mother 64   • Neurological problems Mother         FAHR'S DISEASE   • Hyperlipidemia Mother    • Gallbladder  disease Mother    • Dementia Mother    • Stroke Father    • Hypertension Father    • No Known Problems Sister    • Leukemia Maternal Grandmother    • No Known Problems Maternal Grandfather    • Diabetes Paternal Grandmother    • No Known Problems Paternal Grandfather    • No Known Problems Paternal Aunt    • Breast cancer Neg Hx      Current Outpatient Medications on File Prior to Visit   Medication Sig Dispense Refill   • Calcium 600 MG tablet Take 2 tablets by mouth daily     • fluticasone-salmeterol (Advair) 100-50 mcg/dose inhaler Inhale 1 puff 2 (two) times a day      • loratadine (CLARITIN) 10 mg tablet Take 1 tablet by mouth daily      • Multiple Vitamin (MULTIVITAMIN) capsule Take 1 capsule by mouth daily     • rosuvastatin (CRESTOR) 20 MG tablet Take 1 tablet (20 mg total) by mouth daily 90 tablet 1   • Ventolin  (90 Base) MCG/ACT inhaler as needed     • Vitamin D, Cholecalciferol, 400 units TABS Take by mouth     • methylPREDNISolone 4 MG tablet therapy pack Use as directed on package (Patient not taking: Reported on 6/17/2024) 21 each 0     No current facility-administered medications on file prior to visit.     Allergies   Allergen Reactions   • Dust Mite Extract    • Other      Animal dander, trees   • Penicillins Hives   • Pollen Extract       Current Outpatient Medications on File Prior to Visit   Medication Sig Dispense Refill   • Calcium 600 MG tablet Take 2 tablets by mouth daily     • fluticasone-salmeterol (Advair) 100-50 mcg/dose inhaler Inhale 1 puff 2 (two) times a day      • loratadine (CLARITIN) 10 mg tablet Take 1 tablet by mouth daily      • Multiple Vitamin (MULTIVITAMIN) capsule Take 1 capsule by mouth daily     • rosuvastatin (CRESTOR) 20 MG tablet Take 1 tablet (20 mg total) by mouth daily 90 tablet 1   • Ventolin  (90 Base) MCG/ACT inhaler as needed     • Vitamin D, Cholecalciferol, 400 units TABS Take by mouth     • methylPREDNISolone 4 MG tablet therapy pack Use as  "directed on package (Patient not taking: Reported on 6/17/2024) 21 each 0     No current facility-administered medications on file prior to visit.      Social History     Tobacco Use   • Smoking status: Never     Passive exposure: Never   • Smokeless tobacco: Never   Vaping Use   • Vaping status: Never Used   Substance and Sexual Activity   • Alcohol use: Yes     Alcohol/week: 2.0 standard drinks of alcohol     Types: 2 Glasses of wine per week     Comment: social   • Drug use: No   • Sexual activity: Not Currently     Partners: Male     Birth control/protection: Post-menopausal       Objective     /74 (BP Location: Left arm, Patient Position: Sitting, Cuff Size: Large)   Pulse 85   Temp (!) 97.3 °F (36.3 °C)   Ht 4' 9\" (1.448 m)   Wt 71.8 kg (158 lb 6.4 oz)   LMP  (LMP Unknown)   SpO2 98%   BMI 34.28 kg/m²     Physical Exam  Vitals reviewed.   Constitutional:       Appearance: She is well-developed.   HENT:      Head: Normocephalic and atraumatic.      Right Ear: Tympanic membrane, ear canal and external ear normal.      Left Ear: Tympanic membrane, ear canal and external ear normal.      Nose: Nose normal.      Mouth/Throat:      Mouth: Mucous membranes are moist.   Eyes:      Extraocular Movements: Extraocular movements intact.      Conjunctiva/sclera: Conjunctivae normal.      Pupils: Pupils are equal, round, and reactive to light.   Neck:      Thyroid: No thyromegaly.      Vascular: No JVD.   Cardiovascular:      Rate and Rhythm: Normal rate and regular rhythm.      Pulses: Normal pulses.      Heart sounds: Normal heart sounds. No murmur heard.  Pulmonary:      Effort: Pulmonary effort is normal.      Breath sounds: Normal breath sounds. No wheezing.   Abdominal:      General: Bowel sounds are normal. There is no distension.      Palpations: Abdomen is soft. There is no mass.      Tenderness: There is no abdominal tenderness.   Musculoskeletal:         General: No swelling, tenderness or deformity. " Normal range of motion.      Cervical back: Normal range of motion and neck supple. No muscular tenderness.      Right lower leg: No edema.      Left lower leg: No edema.   Lymphadenopathy:      Cervical: No cervical adenopathy.   Skin:     General: Skin is warm.      Capillary Refill: Capillary refill takes less than 2 seconds.      Findings: Rash (improved but not resolved hand erythema) present.   Neurological:      General: No focal deficit present.      Mental Status: She is alert and oriented to person, place, and time.      Cranial Nerves: No cranial nerve deficit.      Sensory: No sensory deficit.      Motor: No weakness or abnormal muscle tone.      Coordination: Coordination normal.      Gait: Gait normal.      Deep Tendon Reflexes: Reflexes normal.   Psychiatric:         Mood and Affect: Mood normal.         Behavior: Behavior normal.         Thought Content: Thought content normal.         Judgment: Judgment normal.      Comments: PHQ-2/9 Depression Screening    Little interest or pleasure in doing things: 0 - not at all  Feeling down, depressed, or hopeless: 0 - not at all  PHQ-2 Score: 0  PHQ-2 Interpretation: Negative depression screen         Administrative Statements

## 2024-06-18 PROBLEM — L23.89 ALLERGIC CONTACT DERMATITIS DUE TO OTHER AGENTS: Status: ACTIVE | Noted: 2024-06-18

## 2024-06-18 NOTE — ASSESSMENT & PLAN NOTE
I reviewed with pt. Improving but not resolved. Continue to monitor. Recheck 1w if not fully resolved

## 2024-07-24 LAB
ALBUMIN SERPL-MCNC: 4.2 G/DL (ref 3.6–5.1)
ALBUMIN/GLOB SERPL: 1.8 (CALC) (ref 1–2.5)
ALP SERPL-CCNC: 78 U/L (ref 37–153)
ALT SERPL-CCNC: 14 U/L (ref 6–29)
AST SERPL-CCNC: 20 U/L (ref 10–35)
BASOPHILS # BLD AUTO: 37 CELLS/UL (ref 0–200)
BASOPHILS NFR BLD AUTO: 0.6 %
BILIRUB SERPL-MCNC: 0.9 MG/DL (ref 0.2–1.2)
BUN SERPL-MCNC: 19 MG/DL (ref 7–25)
BUN/CREAT SERPL: NORMAL (CALC) (ref 6–22)
CALCIUM SERPL-MCNC: 9.2 MG/DL (ref 8.6–10.4)
CHLORIDE SERPL-SCNC: 106 MMOL/L (ref 98–110)
CHOLEST SERPL-MCNC: 232 MG/DL
CHOLEST/HDLC SERPL: 3.7 (CALC)
CO2 SERPL-SCNC: 29 MMOL/L (ref 20–32)
CREAT SERPL-MCNC: 0.81 MG/DL (ref 0.5–1.05)
EOSINOPHIL # BLD AUTO: 217 CELLS/UL (ref 15–500)
EOSINOPHIL NFR BLD AUTO: 3.5 %
ERYTHROCYTE [DISTWIDTH] IN BLOOD BY AUTOMATED COUNT: 13.1 % (ref 11–15)
GFR/BSA.PRED SERPLBLD CYS-BASED-ARV: 81 ML/MIN/1.73M2
GLOBULIN SER CALC-MCNC: 2.3 G/DL (CALC) (ref 1.9–3.7)
GLUCOSE SERPL-MCNC: 87 MG/DL (ref 65–99)
HCT VFR BLD AUTO: 40.1 % (ref 35–45)
HDLC SERPL-MCNC: 62 MG/DL
HGB BLD-MCNC: 13 G/DL (ref 11.7–15.5)
LDLC SERPL CALC-MCNC: 155 MG/DL (CALC)
LYMPHOCYTES # BLD AUTO: 2337 CELLS/UL (ref 850–3900)
LYMPHOCYTES NFR BLD AUTO: 37.7 %
MCH RBC QN AUTO: 27.5 PG (ref 27–33)
MCHC RBC AUTO-ENTMCNC: 32.4 G/DL (ref 32–36)
MCV RBC AUTO: 85 FL (ref 80–100)
MONOCYTES # BLD AUTO: 490 CELLS/UL (ref 200–950)
MONOCYTES NFR BLD AUTO: 7.9 %
NEUTROPHILS # BLD AUTO: 3119 CELLS/UL (ref 1500–7800)
NEUTROPHILS NFR BLD AUTO: 50.3 %
NONHDLC SERPL-MCNC: 170 MG/DL (CALC)
PLATELET # BLD AUTO: 262 THOUSAND/UL (ref 140–400)
PMV BLD REES-ECKER: 10.4 FL (ref 7.5–12.5)
POTASSIUM SERPL-SCNC: 4.1 MMOL/L (ref 3.5–5.3)
PROT SERPL-MCNC: 6.5 G/DL (ref 6.1–8.1)
RBC # BLD AUTO: 4.72 MILLION/UL (ref 3.8–5.1)
SODIUM SERPL-SCNC: 141 MMOL/L (ref 135–146)
TRIGL SERPL-MCNC: 54 MG/DL
WBC # BLD AUTO: 6.2 THOUSAND/UL (ref 3.8–10.8)

## 2024-07-25 DIAGNOSIS — E78.2 MIXED HYPERLIPIDEMIA: Primary | ICD-10-CM

## 2024-08-23 ENCOUNTER — APPOINTMENT (EMERGENCY)
Dept: RADIOLOGY | Facility: HOSPITAL | Age: 64
End: 2024-08-23
Payer: COMMERCIAL

## 2024-08-23 ENCOUNTER — APPOINTMENT (EMERGENCY)
Dept: CT IMAGING | Facility: HOSPITAL | Age: 64
End: 2024-08-23
Payer: COMMERCIAL

## 2024-08-23 ENCOUNTER — HOSPITAL ENCOUNTER (OUTPATIENT)
Facility: HOSPITAL | Age: 64
Setting detail: OBSERVATION
Discharge: HOME | End: 2024-08-24
Attending: EMERGENCY MEDICINE | Admitting: SURGERY
Payer: COMMERCIAL

## 2024-08-23 DIAGNOSIS — S51.011A LACERATION OF RIGHT ELBOW, INITIAL ENCOUNTER: ICD-10-CM

## 2024-08-23 DIAGNOSIS — T14.8XXA CONTUSION: ICD-10-CM

## 2024-08-23 DIAGNOSIS — S89.91XA INJURY OF RIGHT KNEE, INITIAL ENCOUNTER: Primary | ICD-10-CM

## 2024-08-23 DIAGNOSIS — S86.811A RUPTURE OF RIGHT PATELLAR TENDON, INITIAL ENCOUNTER: ICD-10-CM
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
## 2024-08-23 DIAGNOSIS — T14.8XXA ABRASION: ICD-10-CM

## 2024-08-23 LAB
ALBUMIN SERPL BCG-MCNC: 4.3 G/DL (ref 3.5–5)
ALP SERPL-CCNC: 79 U/L (ref 34–104)
ALT SERPL W P-5'-P-CCNC: 15 U/L (ref 7–52)
ANION GAP SERPL CALCULATED.3IONS-SCNC: 10 MMOL/L (ref 4–13)
APTT PPP: 33 SECONDS (ref 23–34)
AST SERPL W P-5'-P-CCNC: 19 U/L (ref 13–39)
BASOPHILS # BLD AUTO: 0.05 THOUSANDS/ÂΜL (ref 0–0.1)
BASOPHILS NFR BLD AUTO: 1 % (ref 0–1)
BILIRUB SERPL-MCNC: 0.91 MG/DL (ref 0.2–1)
BUN SERPL-MCNC: 19 MG/DL (ref 5–25)
CALCIUM SERPL-MCNC: 9.2 MG/DL (ref 8.4–10.2)
CHLORIDE SERPL-SCNC: 104 MMOL/L (ref 96–108)
CO2 SERPL-SCNC: 25 MMOL/L (ref 21–32)
CREAT SERPL-MCNC: 0.83 MG/DL (ref 0.6–1.3)
EOSINOPHIL # BLD AUTO: 0.2 THOUSAND/ÂΜL (ref 0–0.61)
EOSINOPHIL NFR BLD AUTO: 3 % (ref 0–6)
ERYTHROCYTE [DISTWIDTH] IN BLOOD BY AUTOMATED COUNT: 13.9 % (ref 11.6–15.1)
GFR SERPL CREATININE-BSD FRML MDRD: 74 ML/MIN/1.73SQ M
GLUCOSE SERPL-MCNC: 106 MG/DL (ref 65–140)
HCT VFR BLD AUTO: 37.4 % (ref 34.8–46.1)
HCT VFR BLD AUTO: 40.3 % (ref 34.8–46.1)
HGB BLD-MCNC: 12.1 G/DL (ref 11.5–15.4)
HGB BLD-MCNC: 13.1 G/DL (ref 11.5–15.4)
IMM GRANULOCYTES # BLD AUTO: 0.03 THOUSAND/UL (ref 0–0.2)
IMM GRANULOCYTES NFR BLD AUTO: 0 % (ref 0–2)
INR PPP: 0.98 (ref 0.85–1.19)
LYMPHOCYTES # BLD AUTO: 2.71 THOUSANDS/ÂΜL (ref 0.6–4.47)
LYMPHOCYTES NFR BLD AUTO: 34 % (ref 14–44)
MCH RBC QN AUTO: 27.8 PG (ref 26.8–34.3)
MCHC RBC AUTO-ENTMCNC: 32.5 G/DL (ref 31.4–37.4)
MCV RBC AUTO: 85 FL (ref 82–98)
MONOCYTES # BLD AUTO: 0.61 THOUSAND/ÂΜL (ref 0.17–1.22)
MONOCYTES NFR BLD AUTO: 8 % (ref 4–12)
NEUTROPHILS # BLD AUTO: 4.28 THOUSANDS/ÂΜL (ref 1.85–7.62)
NEUTS SEG NFR BLD AUTO: 54 % (ref 43–75)
NRBC BLD AUTO-RTO: 0 /100 WBCS
PLATELET # BLD AUTO: 251 THOUSANDS/UL (ref 149–390)
PMV BLD AUTO: 9.9 FL (ref 8.9–12.7)
POTASSIUM SERPL-SCNC: 4 MMOL/L (ref 3.5–5.3)
PROT SERPL-MCNC: 6.9 G/DL (ref 6.4–8.4)
PROTHROMBIN TIME: 13.6 SECONDS (ref 12.3–15)
RBC # BLD AUTO: 4.72 MILLION/UL (ref 3.81–5.12)
SODIUM SERPL-SCNC: 139 MMOL/L (ref 135–147)
WBC # BLD AUTO: 7.88 THOUSAND/UL (ref 4.31–10.16)

## 2024-08-23 PROCEDURE — 73564 X-RAY EXAM KNEE 4 OR MORE: CPT

## 2024-08-23 PROCEDURE — 96361 HYDRATE IV INFUSION ADD-ON: CPT

## 2024-08-23 PROCEDURE — 99223 1ST HOSP IP/OBS HIGH 75: CPT | Performed by: SURGERY

## 2024-08-23 PROCEDURE — 36415 COLL VENOUS BLD VENIPUNCTURE: CPT | Performed by: EMERGENCY MEDICINE

## 2024-08-23 PROCEDURE — 73700 CT LOWER EXTREMITY W/O DYE: CPT

## 2024-08-23 PROCEDURE — 80053 COMPREHEN METABOLIC PANEL: CPT | Performed by: EMERGENCY MEDICINE

## 2024-08-23 PROCEDURE — 85610 PROTHROMBIN TIME: CPT | Performed by: EMERGENCY MEDICINE

## 2024-08-23 PROCEDURE — 72125 CT NECK SPINE W/O DYE: CPT

## 2024-08-23 PROCEDURE — 85730 THROMBOPLASTIN TIME PARTIAL: CPT | Performed by: EMERGENCY MEDICINE

## 2024-08-23 PROCEDURE — 90715 TDAP VACCINE 7 YRS/> IM: CPT | Performed by: EMERGENCY MEDICINE

## 2024-08-23 PROCEDURE — 73080 X-RAY EXAM OF ELBOW: CPT

## 2024-08-23 PROCEDURE — 85014 HEMATOCRIT: CPT | Performed by: EMERGENCY MEDICINE

## 2024-08-23 PROCEDURE — 85025 COMPLETE CBC W/AUTO DIFF WBC: CPT | Performed by: EMERGENCY MEDICINE

## 2024-08-23 PROCEDURE — 70450 CT HEAD/BRAIN W/O DYE: CPT

## 2024-08-23 PROCEDURE — 73590 X-RAY EXAM OF LOWER LEG: CPT

## 2024-08-23 PROCEDURE — 93005 ELECTROCARDIOGRAM TRACING: CPT

## 2024-08-23 PROCEDURE — 12001 RPR S/N/AX/GEN/TRNK 2.5CM/<: CPT | Performed by: EMERGENCY MEDICINE

## 2024-08-23 PROCEDURE — 96365 THER/PROPH/DIAG IV INF INIT: CPT

## 2024-08-23 PROCEDURE — 99285 EMERGENCY DEPT VISIT HI MDM: CPT

## 2024-08-23 PROCEDURE — 99284 EMERGENCY DEPT VISIT MOD MDM: CPT | Performed by: EMERGENCY MEDICINE

## 2024-08-23 PROCEDURE — 85018 HEMOGLOBIN: CPT | Performed by: EMERGENCY MEDICINE

## 2024-08-23 PROCEDURE — 90471 IMMUNIZATION ADMIN: CPT

## 2024-08-23 RX ORDER — OXYCODONE HYDROCHLORIDE 5 MG/1
5 TABLET ORAL ONCE
Status: COMPLETED | OUTPATIENT
Start: 2024-08-23 | End: 2024-08-23

## 2024-08-23 RX ORDER — GINSENG 100 MG
1 CAPSULE ORAL ONCE
Status: COMPLETED | OUTPATIENT
Start: 2024-08-23 | End: 2024-08-23

## 2024-08-23 RX ORDER — HYDROMORPHONE HCL IN WATER/PF 6 MG/30 ML
0.2 PATIENT CONTROLLED ANALGESIA SYRINGE INTRAVENOUS EVERY 2 HOUR PRN
Status: DISCONTINUED | OUTPATIENT
Start: 2024-08-23 | End: 2024-08-24 | Stop reason: HOSPADM

## 2024-08-23 RX ORDER — ACETAMINOPHEN 325 MG/1
975 TABLET ORAL EVERY 8 HOURS SCHEDULED
Status: DISCONTINUED | OUTPATIENT
Start: 2024-08-23 | End: 2024-08-24 | Stop reason: HOSPADM

## 2024-08-23 RX ORDER — OXYCODONE HYDROCHLORIDE 5 MG/1
5 TABLET ORAL EVERY 4 HOURS PRN
Status: DISCONTINUED | OUTPATIENT
Start: 2024-08-23 | End: 2024-08-24 | Stop reason: HOSPADM

## 2024-08-23 RX ORDER — ACETAMINOPHEN 10 MG/ML
1000 INJECTION, SOLUTION INTRAVENOUS ONCE
Status: COMPLETED | OUTPATIENT
Start: 2024-08-23 | End: 2024-08-23

## 2024-08-23 RX ORDER — POLYETHYLENE GLYCOL 3350 17 G/17G
17 POWDER, FOR SOLUTION ORAL DAILY
Status: DISCONTINUED | OUTPATIENT
Start: 2024-08-24 | End: 2024-08-24 | Stop reason: HOSPADM

## 2024-08-23 RX ORDER — ENOXAPARIN SODIUM 100 MG/ML
30 INJECTION SUBCUTANEOUS EVERY 12 HOURS
Status: DISCONTINUED | OUTPATIENT
Start: 2024-08-23 | End: 2024-08-24 | Stop reason: HOSPADM

## 2024-08-23 RX ORDER — FLUTICASONE FUROATE AND VILANTEROL 100; 25 UG/1; UG/1
1 POWDER RESPIRATORY (INHALATION)
Status: DISCONTINUED | OUTPATIENT
Start: 2024-08-24 | End: 2024-08-24 | Stop reason: HOSPADM

## 2024-08-23 RX ORDER — LIDOCAINE HYDROCHLORIDE AND EPINEPHRINE 10; 10 MG/ML; UG/ML
1 INJECTION, SOLUTION INFILTRATION; PERINEURAL ONCE
Status: COMPLETED | OUTPATIENT
Start: 2024-08-23 | End: 2024-08-23

## 2024-08-23 RX ADMIN — TETANUS TOXOID, REDUCED DIPHTHERIA TOXOID AND ACELLULAR PERTUSSIS VACCINE, ADSORBED 0.5 ML: 5; 2.5; 8; 8; 2.5 SUSPENSION INTRAMUSCULAR at 13:04

## 2024-08-23 RX ADMIN — BACITRACIN 1 SMALL APPLICATION: 500 OINTMENT TOPICAL at 13:03

## 2024-08-23 RX ADMIN — ENOXAPARIN SODIUM 30 MG: 30 INJECTION SUBCUTANEOUS at 22:29

## 2024-08-23 RX ADMIN — LIDOCAINE HYDROCHLORIDE,EPINEPHRINE BITARTRATE 1 ML: 10; .01 INJECTION, SOLUTION INFILTRATION; PERINEURAL at 13:02

## 2024-08-23 RX ADMIN — ACETAMINOPHEN 1000 MG: 10 INJECTION INTRAVENOUS at 13:01

## 2024-08-23 RX ADMIN — SODIUM CHLORIDE 500 ML: 0.9 INJECTION, SOLUTION INTRAVENOUS at 19:00

## 2024-08-23 RX ADMIN — OXYCODONE HYDROCHLORIDE 5 MG: 5 TABLET ORAL at 13:50
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
## 2024-08-23 NOTE — DISCHARGE INSTRUCTIONS
Please follow up with your primary care provider concerning your visit today.  Please return to the Emergency Department for any other concerns.     If you notice any worsening pain, color change, inability to feel your pulses in your leg, numbness, or weakness please return to the emergency department immediately.  Do not go to urgent care, please call 911.  It is unlikely that you will develop B symptoms, however should you it is important that you are evaluated by an emergency physician immediately.    Your CT showed: No acute fracture., The medial patellar retinaculum appears discontinuous, correlate for patellar pain/instability and mechanism of injury., Small hematoma within the subcutaneous tissues medial to the patella.      An ambulatory referral to orthopedic surgery has been placed for you, you should be contacted in 1 to 2 days in order to set up an appointment, if you are not contacted in that time please contact the provided clinic number.

## 2024-08-23 NOTE — ED NOTES
"Pt states \"I feel normal now, I don't feel like I'm going to pass out anymore.\" Pt is on continuous cardiac and pulse ox monitor.      Gabi Hernandez RN  08/23/24 7320    "
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
## 2024-08-23 NOTE — ED NOTES
Pt was able to eat a turkey sandwich, apple sauce and cookies.       Gabi Hernandez RN  08/23/24 1136

## 2024-08-24 ENCOUNTER — APPOINTMENT (OUTPATIENT)
Dept: MRI IMAGING | Facility: HOSPITAL | Age: 64
End: 2024-08-24
Payer: COMMERCIAL

## 2024-08-24 VITALS
SYSTOLIC BLOOD PRESSURE: 133 MMHG | HEIGHT: 57 IN | DIASTOLIC BLOOD PRESSURE: 62 MMHG | RESPIRATION RATE: 15 BRPM | OXYGEN SATURATION: 97 % | WEIGHT: 160 LBS | HEART RATE: 77 BPM | TEMPERATURE: 97.7 F | BODY MASS INDEX: 34.52 KG/M2

## 2024-08-24 LAB
ABO GROUP BLD: NORMAL
ABO GROUP BLD: NORMAL
ANION GAP SERPL CALCULATED.3IONS-SCNC: 6 MMOL/L (ref 4–13)
BLD GP AB SCN SERPL QL: NEGATIVE
BUN SERPL-MCNC: 17 MG/DL (ref 5–25)
CALCIUM SERPL-MCNC: 8.5 MG/DL (ref 8.4–10.2)
CHLORIDE SERPL-SCNC: 110 MMOL/L (ref 96–108)
CO2 SERPL-SCNC: 24 MMOL/L (ref 21–32)
CREAT SERPL-MCNC: 0.72 MG/DL (ref 0.6–1.3)
DME PARACHUTE DELIVERY DATE REQUESTED: NORMAL
DME PARACHUTE ITEM DESCRIPTION: NORMAL
DME PARACHUTE ORDER STATUS: NORMAL
DME PARACHUTE SUPPLIER NAME: NORMAL
DME PARACHUTE SUPPLIER PHONE: NORMAL
ERYTHROCYTE [DISTWIDTH] IN BLOOD BY AUTOMATED COUNT: 14.1 % (ref 11.6–15.1)
GFR SERPL CREATININE-BSD FRML MDRD: 88 ML/MIN/1.73SQ M
GLUCOSE P FAST SERPL-MCNC: 94 MG/DL (ref 65–99)
GLUCOSE SERPL-MCNC: 94 MG/DL (ref 65–140)
HCT VFR BLD AUTO: 36.2 % (ref 34.8–46.1)
HGB BLD-MCNC: 11.3 G/DL (ref 11.5–15.4)
MCH RBC QN AUTO: 27.1 PG (ref 26.8–34.3)
MCHC RBC AUTO-ENTMCNC: 31.2 G/DL (ref 31.4–37.4)
MCV RBC AUTO: 87 FL (ref 82–98)
PLATELET # BLD AUTO: 211 THOUSANDS/UL (ref 149–390)
PMV BLD AUTO: 10 FL (ref 8.9–12.7)
POTASSIUM SERPL-SCNC: 3.5 MMOL/L (ref 3.5–5.3)
RBC # BLD AUTO: 4.17 MILLION/UL (ref 3.81–5.12)
RH BLD: POSITIVE
RH BLD: POSITIVE
SODIUM SERPL-SCNC: 140 MMOL/L (ref 135–147)
SPECIMEN EXPIRATION DATE: NORMAL
WBC # BLD AUTO: 8.14 THOUSAND/UL (ref 4.31–10.16)

## 2024-08-24 PROCEDURE — 97166 OT EVAL MOD COMPLEX 45 MIN: CPT

## 2024-08-24 PROCEDURE — 86901 BLOOD TYPING SEROLOGIC RH(D): CPT | Performed by: PHYSICIAN ASSISTANT

## 2024-08-24 PROCEDURE — 85027 COMPLETE CBC AUTOMATED: CPT | Performed by: PHYSICIAN ASSISTANT

## 2024-08-24 PROCEDURE — 97163 PT EVAL HIGH COMPLEX 45 MIN: CPT

## 2024-08-24 PROCEDURE — 86850 RBC ANTIBODY SCREEN: CPT | Performed by: PHYSICIAN ASSISTANT

## 2024-08-24 PROCEDURE — 86900 BLOOD TYPING SEROLOGIC ABO: CPT | Performed by: PHYSICIAN ASSISTANT

## 2024-08-24 PROCEDURE — 99204 OFFICE O/P NEW MOD 45 MIN: CPT | Performed by: ORTHOPAEDIC SURGERY

## 2024-08-24 PROCEDURE — 73721 MRI JNT OF LWR EXTRE W/O DYE: CPT

## 2024-08-24 PROCEDURE — 80048 BASIC METABOLIC PNL TOTAL CA: CPT | Performed by: PHYSICIAN ASSISTANT

## 2024-08-24 PROCEDURE — 99238 HOSP IP/OBS DSCHRG MGMT 30/<: CPT | Performed by: SURGERY

## 2024-08-24 RX ORDER — CEFAZOLIN SODIUM 2 G/50ML
2000 SOLUTION INTRAVENOUS
OUTPATIENT
Start: 2024-08-24 | End: 2024-08-25

## 2024-08-24 RX ORDER — OXYCODONE HYDROCHLORIDE 5 MG/1
5 TABLET ORAL EVERY 4 HOURS PRN
Qty: 10 TABLET | Refills: 0 | Status: SHIPPED | OUTPATIENT
Start: 2024-08-24 | End: 2024-08-29

## 2024-08-24 RX ORDER — ACETAMINOPHEN 325 MG/1
975 TABLET ORAL EVERY 8 HOURS SCHEDULED
Start: 2024-08-24

## 2024-08-24 RX ADMIN — FLUTICASONE FUROATE AND VILANTEROL TRIFENATATE 1 PUFF: 100; 25 POWDER RESPIRATORY (INHALATION) at 08:20

## 2024-08-24 RX ADMIN — ACETAMINOPHEN 975 MG: 325 TABLET, FILM COATED ORAL at 14:05

## 2024-08-24 RX ADMIN — ENOXAPARIN SODIUM 30 MG: 30 INJECTION SUBCUTANEOUS at 09:34
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
## 2024-08-24 NOTE — PHYSICAL THERAPY NOTE
Physical Therapy Cancellation Note       08/24/24 0520   Note Type   Note type Cancelled Session   Cancel Reasons Patient to operating room   Additional Comments PT consult received and chart reviewed. Pt planned for OR today for R patellar tendon repair. Will hold PT evaluation and f/u next day. Updated WB and activity orders appreciated.       Aurora Prince, PT, DPT   Available via [email or web address]  NPI # [id number]  [place] License - [id number]  8/24/2024
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
## 2024-08-24 NOTE — PLAN OF CARE
Problem: PAIN - ADULT  Goal: Verbalizes/displays adequate comfort level or baseline comfort level  Description: Interventions:  - Encourage patient to monitor pain and request assistance  - Assess pain using appropriate pain scale  - Administer analgesics based on type and severity of pain and evaluate response  - Implement non-pharmacological measures as appropriate and evaluate response  - Consider cultural and social influences on pain and pain management  - Notify physician/advanced practitioner if interventions unsuccessful or patient reports new pain  Outcome: Progressing     Problem: INFECTION - ADULT  Goal: Absence or prevention of progression during hospitalization  Description: INTERVENTIONS:  - Assess and monitor for signs and symptoms of infection  - Monitor lab/diagnostic results  - Monitor all insertion sites, i.e. indwelling lines, tubes, and drains  - Monitor endotracheal if appropriate and nasal secretions for changes in amount and color  - [place] appropriate cooling/warming therapies per order  - Administer medications as ordered  - Instruct and encourage patient and family to use good hand hygiene technique  - Identify and instruct in appropriate isolation precautions for identified infection/condition  Outcome: Progressing  Goal: Absence of fever/infection during neutropenic period  Description: INTERVENTIONS:  - Monitor WBC    Outcome: Progressing     Problem: SAFETY ADULT  Goal: Patient will remain free of falls  Description: INTERVENTIONS:  - Educate patient/family on patient safety including physical limitations  - Instruct patient to call for assistance with activity   - Consult OT/PT to assist with strengthening/mobility   - Keep Call bell within reach  - Keep bed low and locked with side rails adjusted as appropriate  - Keep care items and personal belongings within reach  - Initiate and maintain comfort rounds  - Make Fall Risk Sign visible to staff  - Offer Toileting in advance of  need  - Initiate/Maintain alarm  - Obtain necessary fall risk management equipment:   - Apply yellow socks and bracelet for high fall risk patients  - Consider moving patient to room near nurses station  Outcome: Progressing  Goal: Maintain or return to baseline ADL function  Description: INTERVENTIONS:  -  Assess patient's ability to carry out ADLs; assess patient's baseline for ADL function and identify physical deficits which impact ability to perform ADLs (bathing, care of mouth/teeth, toileting, grooming, dressing, etc.)  - Assess/evaluate cause of self-care deficits   - Assess range of motion  - Assess patient's mobility; develop plan if impaired  - Assess patient's need for assistive devices and provide as appropriate  - Encourage maximum independence but intervene and supervise when necessary  - Involve family in performance of ADLs  - Assess for home care needs following discharge   - Consider OT consult to assist with ADL evaluation and planning for discharge  - Provide patient education as appropriate  Outcome: Progressing  Goal: Maintains/Returns to pre admission functional level  Description: INTERVENTIONS:  - Perform AM-PAC 6 Click Basic Mobility/ Daily Activity assessment daily.  - Set and communicate daily mobility goal to care team and patient/family/caregiver.   - Collaborate with rehabilitation services on mobility goals if consulted  - Perform Range of Motion   - Reposition patient   - Dangle patient    - Stand patient    - Ambulate patient   - Out of bed to chair   - Out of bed for meals   - Out of bed for toileting  - Record patient progress and toleration of activity level   Outcome: Progressing     Problem: DISCHARGE PLANNING  Goal: Discharge to home or other facility with appropriate resources  Description: INTERVENTIONS:  - Identify barriers to discharge w/patient and caregiver  - Arrange for needed discharge resources and transportation as appropriate  - Identify discharge learning needs  (meds, wound care, etc.)  - Arrange for interpretive services to assist at discharge as needed  - Refer to Case Management Department for coordinating discharge planning if the patient needs post-hospital services based on physician/advanced practitioner order or complex needs related to functional status, cognitive ability, or social support system  Outcome: Progressing     Problem: Knowledge Deficit  Goal: Patient/family/caregiver demonstrates understanding of disease process, treatment plan, medications, and discharge instructions  Description: Complete learning assessment and assess knowledge base.  Interventions:  - Provide teaching at level of understanding  - Provide teaching via preferred learning methods  Outcome: Progressing

## 2024-08-24 NOTE — ED NOTES
Pt at bedside stating after ambulating to the rest room with walker that she became dizzy again. Provider is requested at bedside. Pt's vitals and EKG was taken.      Gabi Hernandez RN  08/23/24 2007
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
## 2024-08-24 NOTE — PLAN OF CARE
Problem: PAIN - ADULT  Goal: Verbalizes/displays adequate comfort level or baseline comfort level  Description: Interventions:  - Encourage patient to monitor pain and request assistance  - Assess pain using appropriate pain scale  - Administer analgesics based on type and severity of pain and evaluate response  - Implement non-pharmacological measures as appropriate and evaluate response  - Consider cultural and social influences on pain and pain management  - Notify physician/advanced practitioner if interventions unsuccessful or patient reports new pain  Outcome: Progressing     Problem: INFECTION - ADULT  Goal: Absence or prevention of progression during hospitalization  Description: INTERVENTIONS:  - Assess and monitor for signs and symptoms of infection  - Monitor lab/diagnostic results  - Monitor all insertion sites, i.e. indwelling lines, tubes, and drains  - Monitor endotracheal if appropriate and nasal secretions for changes in amount and color  - [place] appropriate cooling/warming therapies per order  - Administer medications as ordered  - Instruct and encourage patient and family to use good hand hygiene technique  - Identify and instruct in appropriate isolation precautions for identified infection/condition  Outcome: Progressing  Goal: Absence of fever/infection during neutropenic period  Description: INTERVENTIONS:  - Monitor WBC    Outcome: Progressing     Problem: SAFETY ADULT  Goal: Patient will remain free of falls  Description: INTERVENTIONS:  - Educate patient/family on patient safety including physical limitations  - Instruct patient to call for assistance with activity   - Consult OT/PT to assist with strengthening/mobility   - Keep Call bell within reach  - Keep bed low and locked with side rails adjusted as appropriate  - Keep care items and personal belongings within reach  - Initiate and maintain comfort rounds  - Make Fall Risk Sign visible to staff  - Offer Toileting every  Hours,  in advance of need  - Initiate/Maintain alarm  - Obtain necessary fall risk management equipment:   - Apply yellow socks and bracelet for high fall risk patients  - Consider moving patient to room near nurses station  Outcome: Progressing  Goal: Maintain or return to baseline ADL function  Description: INTERVENTIONS:  -  Assess patient's ability to carry out ADLs; assess patient's baseline for ADL function and identify physical deficits which impact ability to perform ADLs (bathing, care of mouth/teeth, toileting, grooming, dressing, etc.)  - Assess/evaluate cause of self-care deficits   - Assess range of motion  - Assess patient's mobility; develop plan if impaired  - Assess patient's need for assistive devices and provide as appropriate  - Encourage maximum independence but intervene and supervise when necessary  - Involve family in performance of ADLs  - Assess for home care needs following discharge   - Consider OT consult to assist with ADL evaluation and planning for discharge  - Provide patient education as appropriate  Outcome: Progressing  Goal: Maintains/Returns to pre admission functional level  Description: INTERVENTIONS:  - Perform AM-PAC 6 Click Basic Mobility/ Daily Activity assessment daily.  - Set and communicate daily mobility goal to care team and patient/family/caregiver.   - Collaborate with rehabilitation services on mobility goals if consulted  - Perform Range of Motion 3 times a day.  - Reposition patient every 3 hours.  - Dangle patient 3 times a day  - Stand patient 3 times a day  - Ambulate patient 3 times a day  - Out of bed to chair 3 times a day   - Out of bed for meals 3 times a day  - Out of bed for toileting  - Record patient progress and toleration of activity level   Outcome: Progressing     Problem: DISCHARGE PLANNING  Goal: Discharge to home or other facility with appropriate resources  Description: INTERVENTIONS:  - Identify barriers to discharge w/patient and caregiver  -  Arrange for needed discharge resources and transportation as appropriate  - Identify discharge learning needs (meds, wound care, etc.)  - Arrange for interpretive services to assist at discharge as needed  - Refer to Case Management Department for coordinating discharge planning if the patient needs post-hospital services based on physician/advanced practitioner order or complex needs related to functional status, cognitive ability, or social support system  Outcome: Progressing
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
## 2024-08-24 NOTE — OCCUPATIONAL THERAPY NOTE
Occupational Therapy Cancellation Note         Patient Name: Yudi Caba  Today's Date: 8/24/2024 08/24/24 0739   Note Type   Note type Cancelled Session   Cancel Reasons Patient to operating room   Additional Comments OT orders received, chart review completed. Pt with plan for OR today for repair of R patellar tendon. Will hold OT evaluation at this time and see post op as appropriate/able.     Jovita Whitney MS, OTR/L
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
## 2024-08-24 NOTE — DISCHARGE INSTR - AVS FIRST PAGE
Discharge Instructions - Orthopedics      Weight Bearing Status:                                           - Active and passive range of motion of right knee and weightbearing as tolerated. She can use the knee immobilizer as needed for comfort.     Pain:  - Continue analgesics as directed.    Appt Instructions:   - If you do not have your appointment, please call the Orthopedic Surgery Clinic at [phone] to schedule an appointment in 2 weeks.  - Otherwise, followup as scheduled.  - Contact the office sooner if you experience any increased numbness/tingling in the extremities.      Miscellaneous:  - gently work on range of motion and use crutches/walker/knee immobilizer as needed for comfort.   - Activity as tolerated with assistance.  - Continue PT and OT evaluation and treatment as indicated.        Traumatic Right Leg Wounds - Care Instructions:     Wound Care:  - Wash laceration/wound daily, gently in the shower, do not scrub. Pat dry with clean towel. Do NOT immerse completely in water (i.e. tub or swimming pool) until fully healed  - Keep wounds moisturized and with a dressing while wearing a knee immobilizer, otherwise you can leave the wounds out to air  - Call office if you develop fever/chills, redness/swelling/drainage from the site.    Additional Instructions:  - If you have any questions or concerns after discharge please call the office.  - Call office or return to ER if fever greater than 101, chills, increasing redness/swelling at site of laceration/wound, purulent or foul smelling drainage from laceration/wound, and/or worsening/uncontrollable pain.
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
## 2024-08-25 LAB
ATRIAL RATE: 64 BPM
ATRIAL RATE: 77 BPM
P AXIS: 54 DEGREES
P AXIS: 54 DEGREES
PR INTERVAL: 112 MS
PR INTERVAL: 124 MS
QRS AXIS: 39 DEGREES
QRS AXIS: 74 DEGREES
QRSD INTERVAL: 84 MS
QRSD INTERVAL: 90 MS
QT INTERVAL: 398 MS
QT INTERVAL: 450 MS
QTC INTERVAL: 450 MS
QTC INTERVAL: 464 MS
T WAVE AXIS: 69 DEGREES
T WAVE AXIS: 79 DEGREES
VENTRICULAR RATE: 64 BPM
VENTRICULAR RATE: 77 BPM

## 2024-08-25 PROCEDURE — 93010 ELECTROCARDIOGRAM REPORT: CPT | Performed by: INTERNAL MEDICINE

## 2024-08-26 ENCOUNTER — TRANSITIONAL CARE MANAGEMENT (OUTPATIENT)
Dept: FAMILY MEDICINE CLINIC | Facility: CLINIC | Age: 64
End: 2024-08-26

## 2024-08-28 ENCOUNTER — OFFICE VISIT (OUTPATIENT)
Dept: OBGYN CLINIC | Facility: CLINIC | Age: 64
End: 2024-08-28
Payer: MEDICARE

## 2024-08-28 VITALS
WEIGHT: 160 LBS | BODY MASS INDEX: 34.52 KG/M2 | DIASTOLIC BLOOD PRESSURE: 71 MMHG | SYSTOLIC BLOOD PRESSURE: 117 MMHG | HEART RATE: 75 BPM | HEIGHT: 57 IN

## 2024-08-28 DIAGNOSIS — S80.821A BLISTER OF RIGHT LOWER EXTREMITY, INITIAL ENCOUNTER: ICD-10-CM

## 2024-08-28 DIAGNOSIS — S80.01XA CONTUSION OF RIGHT KNEE, INITIAL ENCOUNTER: ICD-10-CM

## 2024-08-28 PROCEDURE — 99204 OFFICE O/P NEW MOD 45 MIN: CPT | Performed by: STUDENT IN AN ORGANIZED HEALTH CARE EDUCATION/TRAINING PROGRAM
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
## 2024-08-29 ENCOUNTER — OFFICE VISIT (OUTPATIENT)
Dept: FAMILY MEDICINE CLINIC | Facility: CLINIC | Age: 64
End: 2024-08-29

## 2024-08-29 VITALS
SYSTOLIC BLOOD PRESSURE: 118 MMHG | HEIGHT: 57 IN | BODY MASS INDEX: 35.34 KG/M2 | WEIGHT: 163.8 LBS | DIASTOLIC BLOOD PRESSURE: 78 MMHG | OXYGEN SATURATION: 98 % | TEMPERATURE: 97.9 F | HEART RATE: 80 BPM

## 2024-08-29 DIAGNOSIS — M25.561 ACUTE PAIN OF RIGHT KNEE: ICD-10-CM

## 2024-08-29 DIAGNOSIS — R23.8 TRAUMATIC BULLA: ICD-10-CM

## 2024-08-29 DIAGNOSIS — S51.011S ELBOW LACERATION, RIGHT, SEQUELA: ICD-10-CM

## 2024-08-29 DIAGNOSIS — T07.XXXA MULTIPLE BRUISES: ICD-10-CM
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
## 2024-08-29 DIAGNOSIS — Z76.89 ENCOUNTER FOR SUPPORT AND COORDINATION OF TRANSITION OF CARE: Primary | ICD-10-CM

## 2024-08-29 LAB
DME PARACHUTE DELIVERY DATE ACTUAL: NORMAL
DME PARACHUTE DELIVERY DATE REQUESTED: NORMAL
DME PARACHUTE ITEM DESCRIPTION: NORMAL
DME PARACHUTE ORDER STATUS: NORMAL
DME PARACHUTE SUPPLIER NAME: NORMAL
DME PARACHUTE SUPPLIER PHONE: NORMAL
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
## 2024-08-30 PROBLEM — Z76.89 ENCOUNTER FOR SUPPORT AND COORDINATION OF TRANSITION OF CARE: Status: ACTIVE | Noted: 2024-08-30

## 2024-09-03 ENCOUNTER — TELEPHONE (OUTPATIENT)
Age: 64
End: 2024-09-03
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
## 2024-09-05 ENCOUNTER — OFFICE VISIT (OUTPATIENT)
Dept: FAMILY MEDICINE CLINIC | Facility: CLINIC | Age: 64
End: 2024-09-05

## 2024-09-05 VITALS
HEIGHT: 57 IN | BODY MASS INDEX: 35.6 KG/M2 | TEMPERATURE: 97.3 F | WEIGHT: 165 LBS | HEART RATE: 77 BPM | DIASTOLIC BLOOD PRESSURE: 78 MMHG | OXYGEN SATURATION: 97 % | SYSTOLIC BLOOD PRESSURE: 124 MMHG

## 2024-09-05 DIAGNOSIS — R23.8 TRAUMATIC BULLA: Primary | ICD-10-CM

## 2024-09-06 PROBLEM — R23.8 TRAUMATIC BULLA: Status: ACTIVE | Noted: 2024-09-06

## 2024-09-06 NOTE — ASSESSMENT & PLAN NOTE
Hemorrhagic.  S/p debridement of surface tissue.  No signs of infection or persistent bleeding noted.  I reviewed further wound care with pt. Recheck 2w if not resolving - earlier for any signs of infection or other complication
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
## 2024-09-09 ENCOUNTER — TELEPHONE (OUTPATIENT)
Age: 64
End: 2024-09-09

## 2024-09-09 NOTE — TELEPHONE ENCOUNTER
"Pt called, she wanted to know how long you think it will take for the wound on her calf to heal, and should she keep it covered while it is healing.     thinks it should be seen again by provider, she just wants your opinion on how to treat it moving forward.  Patient stated there are no signs of infection, it has stopped bleeding, and it does not look any worse than when she was in office.  She stated it definitely still looks \"raw\".   "

## 2024-09-09 NOTE — TELEPHONE ENCOUNTER
Pt would like to know- should it be covered or uncovered? States will continue present care & will check in on Friday to provide an update.

## 2024-09-13 NOTE — TELEPHONE ENCOUNTER
Pt called with a wound update. Part of it is still open, red and swollen but not warm to the touch. It also feels hard at that area. She denies any fever. The pt has been keeping it covered as advised. She would like to make an appt for Dr Lucero to check it next week. Please advise.

## 2024-09-19 ENCOUNTER — OFFICE VISIT (OUTPATIENT)
Dept: FAMILY MEDICINE CLINIC | Facility: CLINIC | Age: 64
End: 2024-09-19
Payer: MEDICARE

## 2024-09-19 VITALS
BODY MASS INDEX: 36.33 KG/M2 | TEMPERATURE: 97.4 F | HEART RATE: 73 BPM | OXYGEN SATURATION: 97 % | WEIGHT: 168.4 LBS | SYSTOLIC BLOOD PRESSURE: 104 MMHG | HEIGHT: 57 IN | DIASTOLIC BLOOD PRESSURE: 66 MMHG

## 2024-09-19 DIAGNOSIS — E78.00 HYPERCHOLESTEROLEMIA: ICD-10-CM

## 2024-09-19 DIAGNOSIS — L03.115 CELLULITIS OF RIGHT LEG: Primary | ICD-10-CM

## 2024-09-19 PROCEDURE — 99213 OFFICE O/P EST LOW 20 MIN: CPT | Performed by: FAMILY MEDICINE

## 2024-09-19 PROCEDURE — G2211 COMPLEX E/M VISIT ADD ON: HCPCS | Performed by: FAMILY MEDICINE

## 2024-09-19 RX ORDER — SULFAMETHOXAZOLE/TRIMETHOPRIM 800-160 MG
1 TABLET ORAL 2 TIMES DAILY
Qty: 14 TABLET | Refills: 0 | Status: SHIPPED | OUTPATIENT
Start: 2024-09-19 | End: 2024-09-26

## 2024-09-19 RX ORDER — ROSUVASTATIN CALCIUM 20 MG/1
20 TABLET, COATED ORAL DAILY
Qty: 90 TABLET | Refills: 1 | Status: SHIPPED | OUTPATIENT
Start: 2024-09-19
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
## 2024-09-30 ENCOUNTER — EVALUATION (OUTPATIENT)
Dept: PHYSICAL THERAPY | Facility: CLINIC | Age: 64
End: 2024-09-30
Payer: MEDICARE

## 2024-09-30 DIAGNOSIS — S80.01XA CONTUSION OF RIGHT KNEE, INITIAL ENCOUNTER: ICD-10-CM

## 2024-09-30 DIAGNOSIS — M25.561 ACUTE PAIN OF RIGHT KNEE: Primary | ICD-10-CM

## 2024-09-30 PROCEDURE — 97161 PT EVAL LOW COMPLEX 20 MIN: CPT

## 2024-09-30 PROCEDURE — 97110 THERAPEUTIC EXERCISES: CPT
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
## 2024-10-01 ENCOUNTER — HOSPITAL ENCOUNTER (OUTPATIENT)
Dept: RADIOLOGY | Age: 64
Discharge: HOME | End: 2024-10-01
Payer: MEDICARE

## 2024-10-01 VITALS — WEIGHT: 168 LBS | BODY MASS INDEX: 36.24 KG/M2 | HEIGHT: 57 IN

## 2024-10-01 DIAGNOSIS — Z12.31 SCREENING MAMMOGRAM FOR BREAST CANCER: ICD-10-CM

## 2024-10-01 PROCEDURE — 77067 SCR MAMMO BI INCL CAD: CPT

## 2024-10-01 PROCEDURE — 77063 BREAST TOMOSYNTHESIS BI: CPT

## 2024-10-03 ENCOUNTER — OFFICE VISIT (OUTPATIENT)
Dept: PHYSICAL THERAPY | Facility: CLINIC | Age: 64
End: 2024-10-03
Payer: MEDICARE

## 2024-10-03 DIAGNOSIS — M25.561 ACUTE PAIN OF RIGHT KNEE: Primary | ICD-10-CM

## 2024-10-03 DIAGNOSIS — S80.01XA CONTUSION OF RIGHT KNEE, INITIAL ENCOUNTER: ICD-10-CM

## 2024-10-03 PROCEDURE — 97110 THERAPEUTIC EXERCISES: CPT

## 2024-10-03 PROCEDURE — 97112 NEUROMUSCULAR REEDUCATION: CPT

## 2024-10-03 PROCEDURE — 97140 MANUAL THERAPY 1/> REGIONS: CPT

## 2024-10-03 NOTE — PROGRESS NOTES
"Daily Note     Today's date: 10/3/2024  Patient name: Yudi Caba  : 1960  MRN: [id number]  Referring provider: Pipo Ramos DO  Dx:   Encounter Diagnosis     ICD-10-CM    1. Acute pain of right knee  M25.561       2. Contusion of right knee, initial encounter  S80.01XA                      Subjective: Patient denies pain coming into session today.       Objective: See treatment diary below      Assessment: Tolerated treatment {Tolerated treatment :[id number]}. Patient {assessment:[id number]}      Plan: Continue per plan of care.      Precautions: N/A      Diagnosis:    Precautions:    Primary Goals:    Medbridge Access Code:   *asterisks by exercise = given for HEP   Manuals 9/30 10/3      Knee PROM  AA                                      There Ex        Heel slides  2x10      LAQ  3x10      Hip ext/abduction  X15ea YTB      Standing marches  2x10      Hamstring curls  2x10      Hamstring stretch                Nustep/bike  Nustep 6' L1              Neuro Re-Ed        SLR  2x10      Glute bridges  2x10      Rhomberg          Tandem  2x30\"                                      Re-evaluation                  Ther Act                  Lateral walks                 Step ups/downs            Sheri stepover                              Modalities                                                                          "

## 2024-10-03 NOTE — PROGRESS NOTES
"Daily Note     Today's date: 10/3/2024  Patient name: Yudi Caba  : 1960  MRN: [id number]  Referring provider: Pipo Ramos DO  Dx:   Encounter Diagnosis     ICD-10-CM    1. Acute pain of right knee  M25.561       2. Contusion of right knee, initial encounter  S80.01XA           Start Time: 1157          Subjective: Patient denies pain after last session.      Objective: See treatment diary below      Assessment: Tolerated treatment well. Initiated POC today with focus on knee ROM and low-resistance strengthening. Patient requiring for proper technique of mini squats secondary to descending straight down. Patient with some unsteadiness during tandem but able to recover balance. Patient would benefit from continued PT      Plan: Continue per plan of care.      Precautions: N/A    Pt 1:1 from 11:57-12:35  Diagnosis:    Precautions:    Primary Goals:    Medbridge Access Code:   *asterisks by exercise = given for HEP   Manuals 9/30 10/3      Knee PROM  AA                                      There Ex        Heel slides  2x10      LAQ  3x10      Hip ext/abduction  YTB x15ea      Standing marches  2x10ea      Hamstring curls  2x10ea      Hamstring stretch                Nustep/bike  Nustep 6' L1              Neuro Re-Ed        SLR  2x10      Glute bridges  2x10      Rhomberg        Tandem  2x30\"      Mini squats  x15                      Re-evaluation              Ther Act              Lateral walks             Step ups/downs        Sehri stepover                      Modalities                                                               "

## 2024-10-09 ENCOUNTER — OFFICE VISIT (OUTPATIENT)
Dept: PHYSICAL THERAPY | Facility: CLINIC | Age: 64
End: 2024-10-09
Payer: MEDICARE

## 2024-10-09 DIAGNOSIS — S80.01XA CONTUSION OF RIGHT KNEE, INITIAL ENCOUNTER: ICD-10-CM

## 2024-10-09 DIAGNOSIS — M25.561 ACUTE PAIN OF RIGHT KNEE: Primary | ICD-10-CM

## 2024-10-09 PROCEDURE — 97112 NEUROMUSCULAR REEDUCATION: CPT

## 2024-10-09 PROCEDURE — 97110 THERAPEUTIC EXERCISES: CPT

## 2024-10-09 PROCEDURE — 97530 THERAPEUTIC ACTIVITIES: CPT

## 2024-10-09 NOTE — PROGRESS NOTES
"Daily Note     Today's date: 10/9/2024  Patient name: Yudi Caba  : 1960  MRN: [id number]  Referring provider: Pipo Ramos DO  Dx:   Encounter Diagnosis     ICD-10-CM    1. Acute pain of right knee  M25.561       2. Contusion of right knee, initial encounter  S80.01XA                      Subjective: Patient states she felt good after last session. Denies pain.      Objective: See treatment diary below      Assessment: Tolerated treatment well. Progressed patient's exercises today with good tolerance to them all. Patient would benefit from continued PT      Plan: Continue per plan of care.      Precautions: N/A    Pt 1:1 from 5:47-6:28p  Diagnosis:    Precautions:    Primary Goals:    Medbridge Access Code:   *asterisks by exercise = given for HEP   Manuals 9/30 10/3 10/9     Knee PROM  AA                                      There Ex        Heel slides  2x10      LAQ  3x10 2# AW 3x10     Hip ext/abduction  YTB x15ea YTB 2x10ea     Standing marches  2x10ea 2x10 2# AW     Hamstring curls  2x10ea 2x10ea 2# AW     Hamstring stretch                Nustep/bike  Nustep 6' L1 Nustep 7' L3             Neuro Re-Ed        SLR  2x10 2x10     Glute bridges  2x10 2x10     Rhomberg        Tandem  2x30\"      Mini squats  x15 2x10                     Re-evaluation              Ther Act              Lateral walks             Step ups/downs   Fwd/lateral 2x10ea 8\"     Sheri stepover                      Modalities                                                                 "

## 2024-10-10 ENCOUNTER — OFFICE VISIT (OUTPATIENT)
Dept: PHYSICAL THERAPY | Facility: CLINIC | Age: 64
End: 2024-10-10
Payer: MEDICARE

## 2024-10-10 DIAGNOSIS — M25.561 ACUTE PAIN OF RIGHT KNEE: Primary | ICD-10-CM

## 2024-10-10 DIAGNOSIS — S80.01XA CONTUSION OF RIGHT KNEE, INITIAL ENCOUNTER: ICD-10-CM

## 2024-10-10 PROCEDURE — 97110 THERAPEUTIC EXERCISES: CPT

## 2024-10-10 PROCEDURE — 97530 THERAPEUTIC ACTIVITIES: CPT

## 2024-10-10 PROCEDURE — 97112 NEUROMUSCULAR REEDUCATION: CPT

## 2024-10-10 NOTE — PROGRESS NOTES
"Daily Note     Today's date: 10/10/2024  Patient name: Yudi Caba  : 1960  MRN: [id number]  Referring provider: Pipo Ramos DO  Dx:   Encounter Diagnosis     ICD-10-CM    1. Acute pain of right knee  M25.561       2. Contusion of right knee, initial encounter  S80.01XA                      Subjective: Patient states she feels good today and feels like she is getting better each time. Patient will be away her honeymoon, but is scheduled for return.      Objective: See treatment diary below      Assessment: Tolerated treatment well. Patient with some shifting of weight to the R with mini squats. Patient reported some minor R sided low back discomfort with SLR and bridges. Added in fwd lunges without any adverse response. Patient would benefit from continued PT      Plan: Continue per plan of care.      Precautions: N/A      Diagnosis:    Precautions:    Primary Goals:    [employer] Access Code:   *asterisks by exercise = given for HEP   Manuals 9/30 10/3 10/9 1010    Knee PROM  AA  AA                                    There Ex        Heel slides  2x10      LAQ  3x10 2# AW 3x10 2# AW 3x10    Hip ext/abduction  YTB x15ea YTB 2x10ea YTB 2x10ea    Standing marches  2x10ea 2x10 2# AW 2x10 2# AW    Hamstring curls  2x10ea 2x10ea 2# AW 2x10ea 2# AW    Hamstring stretch        DKTC    20x3\"    Nustep/bike  Nustep 6' L1 Nustep 7' L3             Neuro Re-Ed        SLR  2x10 2x10 2x10    Glute bridges  2x10 2x10 RTB 2x10    Rhomberg        Tandem  2x30\"      Mini squats  x15 2x10 2x10    Calf stretch    10x5\"            Re-evaluation              Ther Act             Lateral walks             Step ups/downs   Fwd/lateral 2x10ea 8\" Fwd/lateral 2x10ea    Sheri stepover        Lunge       Fwd(on step) x15     Modalities                                                                   "

## 2024-10-21 ENCOUNTER — OFFICE VISIT (OUTPATIENT)
Dept: PHYSICAL THERAPY | Facility: CLINIC | Age: 64
End: 2024-10-21
Payer: MEDICARE

## 2024-10-21 DIAGNOSIS — M25.561 ACUTE PAIN OF RIGHT KNEE: Primary | ICD-10-CM

## 2024-10-21 DIAGNOSIS — S80.01XA CONTUSION OF RIGHT KNEE, INITIAL ENCOUNTER: ICD-10-CM

## 2024-10-21 PROCEDURE — 97112 NEUROMUSCULAR REEDUCATION: CPT

## 2024-10-21 PROCEDURE — 97110 THERAPEUTIC EXERCISES: CPT

## 2024-10-21 PROCEDURE — 97530 THERAPEUTIC ACTIVITIES: CPT

## 2024-10-21 NOTE — PROGRESS NOTES
"Daily Note     Today's date: 10/21/2024  Patient name: Yudi Caba  : 1960  MRN: [id number]  Referring provider: Pipo Ramos DO  Dx:   Encounter Diagnosis     ICD-10-CM    1. Acute pain of right knee  M25.561       2. Contusion of right knee, initial encounter  S80.01XA           Start Time: 1700  Stop Time: 1741  Total time in clinic (min): 41 minutes    Subjective: Patient states her honeymoon was good. Her knee mostly bothered her if they were walking for a long time.      Objective: See treatment diary below      Assessment: Tolerated treatment well. Able to progress patient today without no adverse response - patient reported feeling a good challenge with the progressions. Some minor shifting of weight to R during squats. Patient would benefit from continued PT      Plan: Continue per plan of care.      Precautions: N/A      Diagnosis:    Precautions:    Primary Goals:    [employer] Access Code:   *asterisks by exercise = given for HEP   Manuals 9/30 10/3 10/9 10/10 10/21   Knee PROM  AA  AA                                    There Ex        Heel slides  2x10      LAQ  3x10 2# AW 3x10 2# AW 3x10 3# AW 3x10   Hip ext/abduction  YTB x15ea YTB 2x10ea YTB 2x10ea    Standing marches  2x10ea 2x10 2# AW 2x10 2# AW 2x10 3#AW   Hamstring curls  2x10ea 2x10ea 2# AW 2x10ea 2# AW 2x10ea 3# AW   Hamstring stretch        DKTC    20x3\"    Nustep/bike  Nustep 6' L1 Nustep 7' L3  Nustep 5' L3           Neuro Re-Ed        SLR  2x10 2x10 2x10 2x10   Glute bridges  2x10 2x10 RTB 2x10 RTB 2x10   Rhomberg        Tandem  2x30\"   2x30\" ea   Mini squats  x15 2x10 2x10 2x10   Calf stretch    10x5\"    TKE     RTB 2x10   SLS        Re-evaluation              Ther Act             Lateral walks         YTB 3 laps   Step ups/downs   Fwd/lateral 2x10ea 8\" Fwd/lateral 2x10ea Fwd/lat 2x10ea   Sheri stepover        Resisted walking     10# 8x   Lunge       Fwd(on step) x15     Modalities                                       "

## 2024-10-23 ENCOUNTER — TELEPHONE (OUTPATIENT)
Dept: OBGYN CLINIC | Facility: CLINIC | Age: 64
End: 2024-10-23

## 2024-10-23 ENCOUNTER — OFFICE VISIT (OUTPATIENT)
Dept: PHYSICAL THERAPY | Facility: CLINIC | Age: 64
End: 2024-10-23
Payer: MEDICARE

## 2024-10-23 DIAGNOSIS — M25.561 ACUTE PAIN OF RIGHT KNEE: Primary | ICD-10-CM

## 2024-10-23 DIAGNOSIS — S80.01XA CONTUSION OF RIGHT KNEE, INITIAL ENCOUNTER: ICD-10-CM

## 2024-10-23 PROCEDURE — 97110 THERAPEUTIC EXERCISES: CPT

## 2024-10-23 PROCEDURE — 97530 THERAPEUTIC ACTIVITIES: CPT

## 2024-10-23 PROCEDURE — 97112 NEUROMUSCULAR REEDUCATION: CPT

## 2024-10-23 NOTE — PROGRESS NOTES
"Daily Note     Today's date: 10/23/2024  Patient name: Yudi Caba  : 1960  MRN: [id number]  Referring provider: Pipo Ramos DO  Dx:   Encounter Diagnosis     ICD-10-CM    1. Acute pain of right knee  M25.561       2. Contusion of right knee, initial encounter  S80.01XA           Start Time: 1645  Stop Time: 1728  Total time in clinic (min): 43 minutes    Subjective: Patient denies any significant pain or soreness after last session.       Objective: See treatment diary below      Assessment: Tolerated treatment well. Maintained progressions made from last session. Patient a little more fatigued today and required more rest breaks, but was able to complete all exercises.  Patient would benefit from continued PT      Plan: Continue per plan of care.      Precautions: N/A      Diagnosis:    Precautions:    Primary Goals:    [employer] Access Code:   *asterisks by exercise = given for HEP   Manuals 10/23 10/3 10/9 10/10 10/21   Knee PROM AA AA  AA                                    There Ex        Heel slides  2x10      LAQ 3# AW 3x10 3x10 2# AW 3x10 2# AW 3x10 3# AW 3x10   Hip ext/abduction  YTB x15ea YTB 2x10ea YTB 2x10ea    Standing marches 2x10 3# AW 2x10ea 2x10 2# AW 2x10 2# AW 2x10 3#AW   Hamstring curls 2x10ea 3# AW 2x10ea 2x10ea 2# AW 2x10ea 2# AW 2x10ea 3# AW   Hamstring stretch        DKTC    20x3\"    Nustep/bike Nustep 5' L3 Nustep 6' L1 Nustep 7' L3  Nustep 5' L3           Neuro Re-Ed        SLR 2x10 2x10 2x10 2x10 2x10   Glute bridges RTB 2x10 2x10 2x10 RTB 2x10 RTB 2x10   Rhomberg        Tandem 2x30\" 2x30\"   2x30\" ea   Mini squats 2x10 (squats) x15 2x10 2x10 2x10   Calf stretch    10x5\"    TKE RTB 2x10    RTB 2x10   SLS        Re-evaluation              Ther Act             Lateral walks  YTB 3 laps       YTB 3 laps   Step ups/downs Fwd/lat 2x10ea 8\"  Fwd/lateral 2x10ea 8\" Fwd/lateral 2x10ea Fwd/lat 2x10ea   Sheri stepover        Resisted walking 10# 8x    10# 8x   Lunge       Fwd(on " step) x15     Modalities

## 2024-10-23 NOTE — TELEPHONE ENCOUNTER
Lmom appt 2/27 needs to be moved to the afternoon due to office in [facility] closing in Jan and he will be in [place] that day. Wednesday Mornings, 8 AM-12 PM, Dr Holloway will be at the following address:    [facility]  [address], [address]  [place] [address]      Thursday afternoons, 1 PM to 5 PM, Dr Holloway will be at the following address:    [address], [address]  [place] [address]

## 2024-10-28 ENCOUNTER — OFFICE VISIT (OUTPATIENT)
Dept: PHYSICAL THERAPY | Facility: CLINIC | Age: 64
End: 2024-10-28
Payer: MEDICARE

## 2024-10-28 DIAGNOSIS — M25.561 ACUTE PAIN OF RIGHT KNEE: Primary | ICD-10-CM

## 2024-10-28 DIAGNOSIS — S80.01XA CONTUSION OF RIGHT KNEE, INITIAL ENCOUNTER: ICD-10-CM

## 2024-10-28 PROCEDURE — 97110 THERAPEUTIC EXERCISES: CPT

## 2024-10-28 PROCEDURE — 97112 NEUROMUSCULAR REEDUCATION: CPT

## 2024-10-28 NOTE — PROGRESS NOTES
"Daily Note     Today's date: 10/28/2024  Patient name: Yudi Caba  : 1960  MRN: [id number]  Referring provider: Pipo Ramos DO  Dx:   Encounter Diagnosis     ICD-10-CM    1. Acute pain of right knee  M25.561       2. Contusion of right knee, initial encounter  S80.01XA           Start Time: 1700  Stop Time: 1745  Total time in clinic (min): 45 minutes    Subjective: No significant changes since last session.      Objective: See treatment diary below      Assessment: Tolerated treatment well. Progressed patient's program by progressing LAQ and hamstring curl to machine with very good tolerance. Continued with POC focused on LE strengthening with no adverse response. Patient would benefit from continued PT      Plan: Continue per plan of care.      Precautions: N/A    Patient 1:1 from 5:09-5:45  Diagnosis:    Precautions:    Primary Goals:    [employer] Access Code:   *asterisks by exercise = given for HEP   Manuals 10/23 10/28 10/9 10/10 10/21   Knee PROM AA   AA                                    There Ex        Heel slides        LAQ 3# AW 3x10 11# 2x10 leg extension machine 2# AW 3x10 2# AW 3x10 3# AW 3x10   Hip ext/abduction  YTB 2x10ea YTB 2x10ea YTB 2x10ea    Standing marches 2x10 3# AW  2x10 2# AW 2x10 2# AW 2x10 3#AW   Hamstring curls 2x10ea 3# AW 11# 2x10 2x10ea 2# AW 2x10ea 2# AW 2x10ea 3# AW   Hamstring stretch        DKTC    20x3\"    Nustep/bike Nustep 5' L3 Nustep 6' L1 Nustep 7' L3  Nustep 5' L3           Neuro Re-Ed        SLR 2x10 2x10 2x10 2x10 2x10   Glute bridges RTB 2x10 2x10 RTB 2x10 RTB 2x10 RTB 2x10   Rhomberg        Tandem 2x30\" 2x30\"   2x30\" ea   Mini squats 2x10 (squats) 2x10 2x10 2x10 2x10   Calf stretch    10x5\"    TKE RTB 2x10 RTB 2x10   RTB 2x10   SLS        Re-evaluation              Ther Act             Lateral walks  YTB 3 laps       YTB 3 laps   Step ups/downs Fwd/lat 2x10ea 8\" Fwd/lat 2x10ea 8\" Fwd/lateral 2x10ea 8\" Fwd/lateral 2x10ea Fwd/lat 2x10ea   Sheri " stepover        Resisted walking 10# 8x 10# 8X   10# 8x   Lunge       Fwd(on step) x15     Modalities

## 2024-10-30 ENCOUNTER — OFFICE VISIT (OUTPATIENT)
Dept: PHYSICAL THERAPY | Facility: CLINIC | Age: 64
End: 2024-10-30
Payer: MEDICARE

## 2024-10-30 DIAGNOSIS — S80.01XA CONTUSION OF RIGHT KNEE, INITIAL ENCOUNTER: ICD-10-CM

## 2024-10-30 DIAGNOSIS — M25.561 ACUTE PAIN OF RIGHT KNEE: Primary | ICD-10-CM

## 2024-10-30 PROCEDURE — 97110 THERAPEUTIC EXERCISES: CPT

## 2024-10-30 PROCEDURE — 97112 NEUROMUSCULAR REEDUCATION: CPT

## 2024-10-30 PROCEDURE — 97530 THERAPEUTIC ACTIVITIES: CPT

## 2024-10-30 NOTE — PROGRESS NOTES
"Daily Note     Today's date: 10/30/2024  Patient name: Yudi Caba  : 1960  MRN: [id number]  Referring provider: Pipo Ramos DO  Dx:   Encounter Diagnosis     ICD-10-CM    1. Acute pain of right knee  M25.561       2. Contusion of right knee, initial encounter  S80.01XA                      Subjective: Patient states she was a little sore after last session but it did not last long.       Objective: See treatment diary below      Assessment: Tolerated treatment well. No changes made to patient's program today. Patient's shows improvement with balance evidenced by decreased bouts of unsteadiness requiring UE support. Patient exhibited good technique with therapeutic exercises and would benefit from continued PT      Plan: Continue per plan of care.      Precautions: N/A      Diagnosis:    Precautions:    Primary Goals:    [employer] Access Code:   *asterisks by exercise = given for HEP   Manuals 10/23 10/28 10/30 10/10 10/21   Knee PROM AA   AA                                    There Ex        Heel slides        LAQ 3# AW 3x10 11# 2x10 leg extension machine 11# 2x10 leg extension 2# AW 3x10 3# AW 3x10   Hip ext/abduction  YTB 2x10ea YTB 2x10 ext YTB 2x10ea    Standing marches 2x10 3# AW   2x10 2# AW 2x10 3#AW   Hamstring curls 2x10ea 3# AW 11# 2x10 11# 2x10 2x10ea 2# AW 2x10ea 3# AW   Hamstring stretch        DKTC    20x3\"    Nustep/bike Nustep 5' L3 Nustep 6' L1 Nustep 7' L3  Nustep 5' L3           Neuro Re-Ed        SLR 2x10 2x10 2x10 2x10 2x10   Glute bridges RTB 2x10 2x10 RTB 2x10 RTB RTB 2x10 RTB 2x10   Rhomberg        Tandem 2x30\" 2x30\" 2x30ea  2x30\" ea   Mini squats 2x10 (squats) 2x10 2x10 2x10 2x10   Calf stretch    10x5\"    TKE RTB 2x10 RTB 2x10 RTB 2x10  RTB 2x10   SLS        Re-evaluation              Ther Act             Lateral walks  YTB 3 laps    YTB 3 laps   YTB 3 laps   Step ups/downs Fwd/lat 2x10ea 8\" Fwd/lat 2x10ea 8\" Fwd/lateral 2x10ea 8\" Fwd/lateral 2x10ea Fwd/lat 2x10ea "   Sheri stepover        Resisted walking 10# 8x 10# 8X 10# 8x  10# 8x   Lunge       Fwd(on step) x15     Modalities

## 2024-11-04 ENCOUNTER — OFFICE VISIT (OUTPATIENT)
Dept: PHYSICAL THERAPY | Facility: CLINIC | Age: 64
End: 2024-11-04
Payer: MEDICARE

## 2024-11-04 ENCOUNTER — HOSPITAL ENCOUNTER (OUTPATIENT)
Dept: RADIOLOGY | Age: 64
Discharge: HOME | End: 2024-11-04
Payer: MEDICARE

## 2024-11-04 DIAGNOSIS — S80.01XA CONTUSION OF RIGHT KNEE, INITIAL ENCOUNTER: ICD-10-CM

## 2024-11-04 DIAGNOSIS — M25.561 ACUTE PAIN OF RIGHT KNEE: Primary | ICD-10-CM

## 2024-11-04 DIAGNOSIS — Z78.0 POSTMENOPAUSAL: ICD-10-CM

## 2024-11-04 DIAGNOSIS — M81.0 OSTEOPOROSIS WITHOUT CURRENT PATHOLOGICAL FRACTURE, UNSPECIFIED OSTEOPOROSIS TYPE: ICD-10-CM

## 2024-11-04 PROCEDURE — 77080 DXA BONE DENSITY AXIAL: CPT

## 2024-11-04 PROCEDURE — 97530 THERAPEUTIC ACTIVITIES: CPT

## 2024-11-04 PROCEDURE — 97110 THERAPEUTIC EXERCISES: CPT

## 2024-11-04 PROCEDURE — 97112 NEUROMUSCULAR REEDUCATION: CPT

## 2024-11-04 NOTE — PROGRESS NOTES
"Daily Note     Today's date: 2024  Patient name: Yudi Caba  : 1960  MRN: [id number]  Referring provider: Pipo Ramos DO  Dx:   Encounter Diagnosis     ICD-10-CM    1. Acute pain of right knee  M25.561       2. Contusion of right knee, initial encounter  S80.01XA                      Subjective: Patient does report having an occasional sharp knee pain since last week. She said she start walking again during her lunch so she's not sure if that's the reason why. States it's random and pain only lasts about 1 minute and then she's fine.      Objective: See treatment diary below      Assessment: Tolerated treatment well. Progressed resisted bridges to GTB with good tolerance. No other changes made to patient's program today. Patient would benefit from continued PT      Plan: Continue per plan of care.      Precautions: N/A      Diagnosis:    Precautions:    Primary Goals:    [email or web address] Access Code:   *asterisks by exercise = given for HEP   Manuals 10/23 10/28 10/30 11/4 10/21   Knee PROM AA                                       There Ex        Heel slides        LAQ 3# AW 3x10 11# 2x10 leg extension machine 11# 2x10 leg extension 11# 2x10 leg extension 3# AW 3x10   Hip ext/abduction  YTB 2x10ea YTB 2x10 ext YTB 2x10 ext    Standing marches 2x10 3# AW    2x10 3#AW   Hamstring curls 2x10ea 3# AW 11# 2x10 11# 2x10 11# 2x10 2x10ea 3# AW   Hamstring stretch        DKTC    20x3\"    Nustep/bike Nustep 5' L3 Nustep 6' L1 Nustep 7' L3  Nustep 5' L3           Neuro Re-Ed        SLR 2x10 2x10 2x10 2x10 2x10   Glute bridges RTB 2x10 2x10 RTB 2x10 RTB RTB 2x10 RTB 2x10   Rhomberg        Tandem 2x30\" 2x30\" 2x30ea 2x30\"ea 2x30\" ea   Mini squats 2x10 (squats) 2x10 2x10 2x10 2x10   Calf stretch        TKE RTB 2x10 RTB 2x10 RTB 2x10 RTB 2x10 RTB 2x10   SLS        Re-evaluation              Ther Act             Lateral walks  YTB 3 laps    YTB 3 laps  YTB 5 laps YTB 3 laps   Step ups/downs Fwd/lat 2x10ea 8\" " "Fwd/lat 2x10ea 8\" Fwd/lateral 2x10ea 8\" Fwd/lateral 2x10ea 8\" Fwd/lat 2x10ea   Sheri stepover        Resisted walking 10# 8x 10# 8X 10# 8x 10# 8x 10# 8x   Lunge            Modalities                                                                             "

## 2024-11-05 ENCOUNTER — TELEPHONE (OUTPATIENT)
Age: 64
End: 2024-11-05

## 2024-11-05 NOTE — TELEPHONE ENCOUNTER
----- Message from Wai Holloway MD sent at 11/5/2024  3:39 PM EST -----  Patient has osteopenia.  No change from her last DEXA scan in 2022.  Plan: Continue vitamin D and calcium and weightbearing exercises.

## 2024-11-06 ENCOUNTER — OFFICE VISIT (OUTPATIENT)
Dept: PHYSICAL THERAPY | Facility: CLINIC | Age: 64
End: 2024-11-06
Payer: MEDICARE

## 2024-11-06 DIAGNOSIS — M25.561 ACUTE PAIN OF RIGHT KNEE: Primary | ICD-10-CM

## 2024-11-06 DIAGNOSIS — S80.01XA CONTUSION OF RIGHT KNEE, INITIAL ENCOUNTER: ICD-10-CM

## 2024-11-06 PROCEDURE — 97112 NEUROMUSCULAR REEDUCATION: CPT

## 2024-11-06 PROCEDURE — 97530 THERAPEUTIC ACTIVITIES: CPT

## 2024-11-06 PROCEDURE — 97110 THERAPEUTIC EXERCISES: CPT

## 2024-11-06 NOTE — PROGRESS NOTES
"Daily Note     Today's date: 2024  Patient name: Yudi Caba  : 1960  MRN: [id number]  Referring provider: Pipo Ramos DO  Dx:   Encounter Diagnosis     ICD-10-CM    1. Acute pain of right knee  M25.561       2. Contusion of right knee, initial encounter  S80.01XA           Start Time: 1700  Stop Time: 1745  Total time in clinic (min): 45 minutes    Subjective: Patient states knee is feeling good.      Objective: See treatment diary below      Assessment: Tolerated treatment well. Modified patient's program to include lunges and stool scoots. Patient was challenged by both exercises but did not have any pain during. Patient would benefit from continued PT      Plan: Continue per plan of care.      Precautions: N/A    Patient 1:1 from 5:05p-5:45p  Diagnosis:    Precautions:    Primary Goals:    [employer] Access Code:   *asterisks by exercise = given for HEP   Manuals 10/23 10/28 10/30 11/4 11/6   Knee PROM AA                                       There Ex        Heel slides        LAQ 3# AW 3x10 11# 2x10 leg extension machine 11# 2x10 leg extension 11# 2x10 leg extension 11# 2x10 leg extension   Hip ext/abduction  YTB 2x10ea YTB 2x10 ext YTB 2x10 ext YTB 2x10 ext   Standing marches 2x10 3# AW       Hamstring curls 2x10ea 3# AW 11# 2x10 11# 2x10 11# 2x10 11# 2x10   Hamstring stretch        DKTC    20x3\"    Nustep/bike Nustep 5' L3 Nustep 6' L1 Nustep 7' L3  Nustep 7' L3           Neuro Re-Ed        SLR 2x10 2x10 2x10 2x10 2x10   Glute bridges RTB 2x10 2x10 RTB 2x10 RTB RTB 2x10 2x10   Rhomberg        Tandem 2x30\" 2x30\" 2x30ea 2x30\"ea 2x30\" ea   Mini squats 2x10 (squats) 2x10 2x10 2x10 2x10   Calf stretch        TKE RTB 2x10 RTB 2x10 RTB 2x10 RTB 2x10 RTB 2x10   SLS        Re-evaluation              Ther Act             Lateral walks  YTB 3 laps    YTB 3 laps  YTB 5 laps YTB 3 laps   Step ups/downs Fwd/lat 2x10ea 8\" Fwd/lat 2x10ea 8\" Fwd/lateral 2x10ea 8\" Fwd/lateral 2x10ea 8\" Fwd/lat 2x10ea " "8\"   Sheri stepover        Resisted walking 10# 8x 10# 8X 10# 8x 10# 8x 10# 8x   Stool scoots     1 lap   Lunge         2x10 w/bolster   Modalities                                                                               "

## 2024-11-11 ENCOUNTER — OFFICE VISIT (OUTPATIENT)
Dept: PHYSICAL THERAPY | Facility: CLINIC | Age: 64
End: 2024-11-11
Payer: MEDICARE

## 2024-11-11 DIAGNOSIS — M25.561 ACUTE PAIN OF RIGHT KNEE: Primary | ICD-10-CM

## 2024-11-11 DIAGNOSIS — S80.01XA CONTUSION OF RIGHT KNEE, INITIAL ENCOUNTER: ICD-10-CM

## 2024-11-11 PROCEDURE — 97112 NEUROMUSCULAR REEDUCATION: CPT

## 2024-11-11 PROCEDURE — 97110 THERAPEUTIC EXERCISES: CPT

## 2024-11-11 NOTE — PROGRESS NOTES
"Daily Note     Today's date: 2024  Patient name: Yudi Caba  : 1960  MRN: [id number]  Referring provider: Pipo Ramos DO  Dx:   Encounter Diagnosis     ICD-10-CM    1. Acute pain of right knee  M25.561       2. Contusion of right knee, initial encounter  S80.01XA           Start Time: 1700  Stop Time: 1745  Total time in clinic (min): 45 minutes    Subjective: Patient denies any significant changes since last session.      Objective: See treatment diary below      Assessment: Tolerated treatment well. Progressed Wbing squats to leg press machine with good tolerance. Also progressed SLR to include 2# AW which made it more challenging. No adverse response during or post tx. Patient would benefit from continued PT      Plan: Continue per plan of care.  Progress note next visit.     Precautions: N/A    Patient 1:1 from 5:16-5:45  Diagnosis:    Precautions:    Primary Goals:    [employer] Access Code:   *asterisks by exercise = given for HEP   Manuals 11/11 10/28 10/30 11/4 11/6   Knee PROM                                        There Ex        Heel slides        LAQ 11# 2x10 leg extension 11# 2x10 leg extension machine 11# 2x10 leg extension 11# 2x10 leg extension 11# 2x10 leg extension   Hip ext/abduction YTB 2x10 ext only YTB 2x10ea YTB 2x10 ext YTB 2x10 ext YTB 2x10 ext   Standing marches        Hamstring curls 11# 2x10 11# 2x10 11# 2x10 11# 2x10 11# 2x10   Hamstring stretch        DKTC    20x3\"    Nustep/bike Nustep 8' L4 Nustep 6' L1 Nustep 7' L3  Nustep 7' L3   Leg press 30# 2x10       Neuro Re-Ed        SLR 2x10 2# 2x10 2x10 2x10 2x10   Glute bridges RTB 2x10 2x10 RTB 2x10 RTB RTB 2x10 2x10   Rhomberg        Tandem 2x30\"ea 2x30\" 2x30ea 2x30\"ea 2x30\" ea   Mini squats  2x10 2x10 2x10 2x10   Calf stretch        TKE RTB 2x10 RTB 2x10 RTB 2x10 RTB 2x10 RTB 2x10   SLS        Re-evaluation              Ther Act             Lateral walks YTB 3 laps    YTB 3 laps  YTB 5 laps YTB 3 laps   Step " "ups/downs Fwd/lat 2x10ea 8\" Fwd/lat 2x10ea 8\" Fwd/lateral 2x10ea 8\" Fwd/lateral 2x10ea 8\" Fwd/lat 2x10ea 8\"   Sheri stepover        Resisted walking 10# 8 laps 10# 8X 10# 8x 10# 8x 10# 8x   Stool scoots     1 lap   Lunge  2x10 w/bolster       2x10 w/bolster   Modalities                                                                                 "

## 2024-11-13 ENCOUNTER — EVALUATION (OUTPATIENT)
Dept: PHYSICAL THERAPY | Facility: CLINIC | Age: 64
End: 2024-11-13
Payer: MEDICARE

## 2024-11-13 DIAGNOSIS — M25.561 ACUTE PAIN OF RIGHT KNEE: Primary | ICD-10-CM

## 2024-11-13 DIAGNOSIS — S80.01XA CONTUSION OF RIGHT KNEE, INITIAL ENCOUNTER: ICD-10-CM

## 2024-11-13 PROCEDURE — 97112 NEUROMUSCULAR REEDUCATION: CPT

## 2024-11-13 PROCEDURE — 97110 THERAPEUTIC EXERCISES: CPT

## 2024-11-13 PROCEDURE — 97140 MANUAL THERAPY 1/> REGIONS: CPT
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
## 2024-11-18 ENCOUNTER — APPOINTMENT (OUTPATIENT)
Dept: PHYSICAL THERAPY | Facility: CLINIC | Age: 64
End: 2024-11-18
Payer: MEDICARE

## 2024-11-20 ENCOUNTER — OFFICE VISIT (OUTPATIENT)
Dept: PHYSICAL THERAPY | Facility: CLINIC | Age: 64
End: 2024-11-20
Payer: MEDICARE

## 2024-11-20 DIAGNOSIS — M25.561 ACUTE PAIN OF RIGHT KNEE: Primary | ICD-10-CM

## 2024-11-20 DIAGNOSIS — S80.01XA CONTUSION OF RIGHT KNEE, INITIAL ENCOUNTER: ICD-10-CM

## 2024-11-20 PROCEDURE — 97530 THERAPEUTIC ACTIVITIES: CPT

## 2024-11-20 PROCEDURE — 97112 NEUROMUSCULAR REEDUCATION: CPT

## 2024-11-20 PROCEDURE — 97110 THERAPEUTIC EXERCISES: CPT

## 2024-11-20 NOTE — PROGRESS NOTES
"Daily Note     Today's date: 2024  Patient name: Yudi Caba  : 1960  MRN: [id number]  Referring provider: Pipo Ramos DO  Dx:   Encounter Diagnosis     ICD-10-CM    1. Acute pain of right knee  M25.561       2. Contusion of right knee, initial encounter  S80.01XA           Start Time: 1700  Stop Time: 1738  Total time in clinic (min): 38 minutes    Subjective: Patient states she's doing pretty good today.       Objective: See treatment diary below      Assessment: Tolerated treatment well. Modified patient's program to include donkey kicks which presented a challenge for patient. Tolerated all other exercises well without reports if pain. Patient would benefit from continued PT      Plan: Continue per plan of care.  Potential discharge next visit.      Precautions: N/A      Diagnosis:    Precautions:    Primary Goals:    Medbridge Access Code:   *asterisks by exercise = given for HEP   Manuals    Knee PROM  AA                                      There Ex        Heel slides        LAQ 11# 2x10 leg extension  11# 2x10 leg extension 11# 2x10 leg extension 11# 2x10 leg extension   Hip ext/abduction YTB 2x10 ext only  YTB 2x10 ext YTB 2x10 ext YTB 2x10 ext   Standing marches        Hamstring curls 11# 2x10  11# 2x10 11# 2x10 11# 2x10   Hamstring stretch        DKTC    20x3\"    Nustep/bike Nustep 8' L4 Nustep 6' L4 TM 6' 0.8mph, 1% incline  Nustep 7' L3   Donkey kicks   5# x15     Leg press 30# 2x10  30# 2x10     Neuro Re-Ed        SLR 2x10 2# 2x10 2#  2x10 2x10   Glute bridges RTB 2x10 GTB 2x10  RTB 2x10 2x10   Rhomberg        Tandem 2x30\"ea  2x30ea 2x30\"ea 2x30\" ea   Mini squats    2x10 2x10   Calf stretch        TKE RTB 2x10  8# 20x RTB 2x10 RTB 2x10   SLS        Re-evaluation             Ther Act            Lateral walks YTB 3 laps  YTB 3 laps  YTB 5 laps YTB 3 laps   Step ups/downs Fwd/lat 2x10ea 8\"  Fwd/lateral 2x10ea 8\" Fwd/lateral 2x10ea 8\" Fwd/lat 2x10ea 8\" "   Sheri stepover        Resisted walking 10# 8 laps  12# 8x 10# 8x 10# 8x   Stool scoots   2 laps  1 lap   Lunge  2x10 w/bolster  2x10 w/bolster   2x10 w/bolster   Modalities

## 2024-12-04 ENCOUNTER — OFFICE VISIT (OUTPATIENT)
Dept: PHYSICAL THERAPY | Facility: CLINIC | Age: 64
End: 2024-12-04
Payer: MEDICARE

## 2024-12-04 DIAGNOSIS — S80.01XA CONTUSION OF RIGHT KNEE, INITIAL ENCOUNTER: ICD-10-CM

## 2024-12-04 DIAGNOSIS — M25.561 ACUTE PAIN OF RIGHT KNEE: Primary | ICD-10-CM

## 2024-12-04 PROCEDURE — 97535 SELF CARE MNGMENT TRAINING: CPT

## 2024-12-04 PROCEDURE — 97110 THERAPEUTIC EXERCISES: CPT

## 2024-12-04 NOTE — PROGRESS NOTES
PT Discharge    Today's date: 2024  Patient name: Yudi Caba  : 1960  MRN: [id number]  Referring provider: Pipo Ramos DO  Dx:   Encounter Diagnosis     ICD-10-CM    1. Acute pain of right knee  M25.561       2. Contusion of right knee, initial encounter  S80.01XA                      Subjective: Patient states she feels about 90% better. States she's able to do her day to day activities. States she not yet back to her normal walking routine, however that's not because of the knee. It's moreso an endurance thing. States she still does not feel super confident with stairs (ascending > descending). She's able to do it but states sometimes her knee feels weak. Denies any pain of the knee with activities.       Objective: See treatment diary below      Assessment: Tolerated treatment well. Patient had good tolerance to all exercises performed today. Reviewed exercises for HEP with patient demonstrating good technique. Patient is able to complete her adl/iadls without any pain. Patient has met all of her short term goals and most of her long term goals. Patient feels like she is a great point to transition to HEP. Therapist agrees with patient. Therefore, she is a good candidate for discharge at this time.     LTG (to be met by discharge):  Patient will be independent with comprehensive HEP for maintenance after discharge. - MET  Patient will improve FOTO score to equal to or above predicted value. - NOT MET  Patient will be able to return to normal walking routine without restriction or pain. - MET (walking limited secondary to endurance not knee)  Patient will be able to ambulate independently without AD. - MET    Updated goals:  5. Patient will be able to negotiate stairs with subjective report of improved confidence. - Ongoing    Plan:  Discharge to HEP.      Precautions: N/A      Diagnosis:    Precautions:    Primary Goals:    Medbridge Access Code:   *asterisks by exercise = given for HEP  "  Manuals 11/11 11/13 11/20 12/4 11/6   Knee PROM  AA                                      There Ex        Heel slides        LAQ 11# 2x10 leg extension  11# 2x10 leg extension 11# 2x10 leg extension    GTB 2x10  HEP 11# 2x10 leg extension   Hip ext/abduction YTB 2x10 ext only  YTB 2x10 ext  YTB 2x10 ext   Standing marches        Hamstring curls 11# 2x10  11# 2x10 11# 2x10    GTB 2x10- HEP 11# 2x10   Hamstring stretch        DKTC        Nustep/bike Nustep 8' L4 Nustep 6' L4 TM 6' 0.8mph, 1% incline Nustep 7' Nustep 7' L3   Donkey kicks   5# x15     Leg press 30# 2x10  30# 2x10 30# 2x10    Neuro Re-Ed        SLR 2x10 2# 2x10 2#  2# 2x10 2x10   Glute bridges RTB 2x10 GTB 2x10  2x10 2x10   Rhomberg        Tandem 2x30\"ea  2x30ea  2x30\" ea   Mini squats    2x10 squats 2x10   Calf stretch        TKE RTB 2x10  8# 20x  RTB 2x10   SLS        Re-evaluation             Ther Act            Lateral walks YTB 3 laps  YTB 3 laps  YTB 3 laps   Step ups/downs Fwd/lat 2x10ea 8\"  Fwd/lateral 2x10ea 8\" Fwd/lateral 2x10ea 8\" Fwd/lat 2x10ea 8\"   Sheri stepover        Resisted walking 10# 8 laps  12# 8x  10# 8x   Stool scoots   2 laps  1 lap   Lunge  2x10 w/bolster  2x10 w/bolster HEP  2x10 w/bolster   Modalities                                                            "

## 2025-01-29 NOTE — ASSESSMENT & PLAN NOTE
Breathing stable.  Continue present care. Recheck 6m   Dr. Loco (cardiology) at [facility].

## 2025-02-12 ENCOUNTER — OFFICE VISIT (OUTPATIENT)
Dept: FAMILY MEDICINE CLINIC | Facility: CLINIC | Age: 65
End: 2025-02-12
Payer: MEDICARE

## 2025-02-12 VITALS — HEIGHT: 57 IN | BODY MASS INDEX: 35.94 KG/M2 | WEIGHT: 166.6 LBS

## 2025-02-12 DIAGNOSIS — E78.2 MIXED HYPERLIPIDEMIA: ICD-10-CM

## 2025-02-12 DIAGNOSIS — J45.20 MILD INTERMITTENT ASTHMA WITHOUT COMPLICATION: ICD-10-CM

## 2025-02-12 DIAGNOSIS — Z13.6 SCREENING FOR CARDIOVASCULAR CONDITION: ICD-10-CM

## 2025-02-12 DIAGNOSIS — Z01.118 ABNORMAL HEARING TEST: ICD-10-CM

## 2025-02-12 DIAGNOSIS — Z01.00 ROUTINE EYE EXAM: ICD-10-CM

## 2025-02-12 DIAGNOSIS — Z11.59 NEED FOR HEPATITIS C SCREENING TEST: ICD-10-CM

## 2025-02-12 DIAGNOSIS — Z00.00 WELCOME TO MEDICARE PREVENTIVE VISIT: Primary | ICD-10-CM

## 2025-02-12 DIAGNOSIS — R94.128 ABNORMAL HEARING TEST: ICD-10-CM

## 2025-02-12 DIAGNOSIS — K63.5 POLYP OF COLON, UNSPECIFIED PART OF COLON, UNSPECIFIED TYPE: ICD-10-CM

## 2025-02-12 PROBLEM — R23.8 TRAUMATIC BULLA: Status: RESOLVED | Noted: 2024-09-06 | Resolved: 2025-02-12

## 2025-02-12 PROCEDURE — G0402 INITIAL PREVENTIVE EXAM: HCPCS | Performed by: FAMILY MEDICINE

## 2025-02-12 PROCEDURE — G0403 EKG FOR INITIAL PREVENT EXAM: HCPCS | Performed by: FAMILY MEDICINE

## 2025-02-12 PROCEDURE — 99214 OFFICE O/P EST MOD 30 MIN: CPT | Performed by: FAMILY MEDICINE

## 2025-02-12 PROCEDURE — G2211 COMPLEX E/M VISIT ADD ON: HCPCS | Performed by: FAMILY MEDICINE

## 2025-02-12 RX ORDER — ROSUVASTATIN CALCIUM 20 MG/1
20 TABLET, COATED ORAL DAILY
Qty: 90 TABLET | Refills: 1 | Status: SHIPPED | OUTPATIENT
Start: 2025-02-12

## 2025-02-12 NOTE — ASSESSMENT & PLAN NOTE
Breathing stable. No increased wheezing. Continue present care. Recheck 6m  Orders:  •  CBC and differential; Future  •  Comprehensive metabolic panel; Future

## 2025-02-12 NOTE — PROGRESS NOTES
Name: Yudi Caba      : 1960      MRN: [id number]  Encounter Provider: Brice Lucero MD  Encounter Date: 2025   Encounter department: [facility]    Assessment & Plan  Welcome to Medicare preventive visit         Mixed hyperlipidemia  Due for labs. Continue rosuvastatin. Recheck 6m  Orders:  •  rosuvastatin (CRESTOR) 20 MG tablet; Take 1 tablet (20 mg total) by mouth daily  •  Comprehensive metabolic panel; Future  •  Lipid panel; Future    Mild intermittent asthma without complication  Breathing stable. No increased wheezing. Continue present care. Recheck 6m  Orders:  •  CBC and differential; Future  •  Comprehensive metabolic panel; Future    Polyp of colon, unspecified part of colon, unspecified type  Due for repeat colonoscopy - referred back to GI. Recheck 6m  Orders:  •  Ambulatory Referral to Gastroenterology; Future    Routine eye exam         Abnormal hearing test         Need for hepatitis C screening test    Orders:  •  Hepatitis C antibody; Future    Screening for cardiovascular condition    Orders:  •  POCT ECG       Preventive health issues were discussed with patient, and age appropriate screening tests were ordered as noted in patient's After Visit Summary. Personalized health advice and appropriate referrals for health education or preventive services given if needed, as noted in patient's After Visit Summary.    History of Present Illness     F/u multiple med issues and Welcome to Medicare exam  - R knee and lower leg have improved. Still with some R knee discomfort but no swelling.   - otherwise pt doing well  - restarted her walking regimen 3-4m ago, but has not returned to her previous distances. Pt  denies CP, palpitations, lightheadedness or other CV symptoms with or without exertion  - pt states that her asthma is stable. Pt is up to date with allergist and is compliant with Advair bid. Would like to have a flu shot today  - pt denies  any new GI or Gu complaints. Due for colonoscopy this year  - Welcome to Medicare done         Patient Care Team:  Brice Lucero MD as PCP - General  Wai Holloway MD    Review of Systems   Constitutional: Negative.    HENT: Negative.     Eyes: Negative.    Respiratory: Negative.     Cardiovascular: Negative.    Gastrointestinal: Negative.    Endocrine: Negative.    Genitourinary: Negative.    Musculoskeletal:  Positive for arthralgias and myalgias. Negative for back pain, gait problem and joint swelling.   Skin: Negative.    Allergic/Immunologic: Negative.    Neurological: Negative.    Hematological: Negative.    Psychiatric/Behavioral: Negative.       Medical History Reviewed by provider this encounter:       Annual Wellness Visit Questionnaire   Yudi Núñez is here for her Welcome to Medicare visit.     Health Risk Assessment:   Patient rates overall health as good. Patient feels that their physical health rating is same. Patient is satisfied with their life. Eyesight was rated as same. Hearing was rated as same. Patient feels that their emotional and mental health rating is same. Patients states they are never, rarely angry. Patient states they are sometimes unusually tired/fatigued. Pain experienced in the last 7 days has been none. Patient states that she has experienced no weight loss or gain in last 6 months.     Depression Screening:   PHQ-2 Score: 0      Fall Risk Screening:   In the past year, patient has experienced: no history of falling in past year      Urinary Incontinence Screening:   Patient has leaked urine accidently in the last six months. Stress incontinence    Home Safety:  Patient does not have trouble with stairs inside or outside of their home. Patient has working smoke alarms and has no working carbon monoxide detector. Home safety hazards include: none.     Nutrition:   Current diet is Low Cholesterol.     Medications:   Patient is currently taking over-the-counter supplements.  OTC medications include: see medication list. Patient is able to manage medications.     Activities of Daily Living (ADLs)/Instrumental Activities of Daily Living (IADLs):   Walk and transfer into and out of bed and chair?: Yes  Dress and groom yourself?: Yes    Bathe or shower yourself?: Yes    Feed yourself? Yes  Do your laundry/housekeeping?: Yes  Manage your money, pay your bills and track your expenses?: Yes  Make your own meals?: Yes    Do your own shopping?: Yes    Previous Hospitalizations:   Any hospitalizations or ED visits within the last 12 months?: Yes    How many hospitalizations have you had in the last year?: 1-2    Hospitalization Comments: Accident ED visit    Advance Care Planning:   Living will: Yes    Durable POA for healthcare: Yes    Advanced directive: Yes    Advanced directive counseling given: Yes      Cognitive Screening:   Provider or family/friend/caregiver concerned regarding cognition?: No    PREVENTIVE SCREENINGS      Cardiovascular Screening:    General: Screening Not Indicated and History Lipid Disorder      Diabetes Screening:     General: Screening Current      Colorectal Cancer Screening:     General: Screening Current      Breast Cancer Screening:     General: Screening Current      Cervical Cancer Screening:    General: History Cervical Cancer      Osteoporosis Screening:    General: History Osteoporosis and Screening Current      Abdominal Aortic Aneurysm (AAA) Screening:        General: Screening Not Indicated      Lung Cancer Screening:     General: Screening Not Indicated      Hepatitis C Screening:    General: Risks and Benefits Discussed    Hep C Screening Accepted: Yes      Screening, Brief Intervention, and Referral to Treatment (SBIRT)     Screening  Typical number of drinks in a day: 0  Typical number of drinks in a week: 1  Interpretation: Low risk drinking behavior.    AUDIT-C Screenin) How often did you have a drink containing alcohol in the past year? 2 to 4  "times a month  2) How many drinks did you have on a typical day when you were drinking in the past year? 1 to 2  3) How often did you have 6 or more drinks on one occasion in the past year? never    AUDIT-C Score: 2  Interpretation: Score 0-2 (female): Negative screen for alcohol misuse    Single Item Drug Screening:  How often have you used an illegal drug (including marijuana) or a prescription medication for non-medical reasons in the past year? never    Single Item Drug Screen Score: 0  Interpretation: Negative screen for possible drug use disorder    Time Spent  Time spent screening/evaluating the patient for alcohol misuse: 1 minutes.     Other Counseling Topics:   Calcium and vitamin D intake and regular weightbearing exercise.     Social Drivers of Health     Food Insecurity: No Food Insecurity (2/12/2025)    Hunger Vital Sign    • Worried About Running Out of Food in the Last Year: Never true    • Ran Out of Food in the Last Year: Never true   Transportation Needs: No Transportation Needs (2/12/2025)    PRAPARE - Transportation    • Lack of Transportation (Medical): No    • Lack of Transportation (Non-Medical): No   Housing Stability: Low Risk  (2/12/2025)    Housing Stability Vital Sign    • Unable to Pay for Housing in the Last Year: No    • Number of Times Moved in the Last Year: 0    • Homeless in the Last Year: No   Utilities: Not At Risk (2/12/2025)    [facility] Utilities    • Threatened with loss of utilities: No     Hearing Screening    500Hz 1000Hz 2000Hz 3000Hz 4000Hz   Right ear 40 25 25 30 25   Left ear 35 20 20 25 30     Vision Screening    Right eye Left eye Both eyes   Without correction      With correction 20/20 20/15 20/15       Objective   Ht 4' 9\" (1.448 m)   Wt 75.6 kg (166 lb 9.6 oz)   LMP  (LMP Unknown)   BMI 36.05 kg/m²     Physical Exam  Vitals reviewed.   Constitutional:       Appearance: She is well-developed.   HENT:      Head: Normocephalic and atraumatic.      Right Ear: Tympanic " membrane, ear canal and external ear normal.      Left Ear: Tympanic membrane, ear canal and external ear normal.      Nose: Nose normal.      Mouth/Throat:      Mouth: Mucous membranes are moist.   Eyes:      Extraocular Movements: Extraocular movements intact.      Conjunctiva/sclera: Conjunctivae normal.      Pupils: Pupils are equal, round, and reactive to light.   Neck:      Thyroid: No thyromegaly.      Vascular: No carotid bruit or JVD.   Cardiovascular:      Rate and Rhythm: Normal rate and regular rhythm.      Pulses: Normal pulses.      Heart sounds: Normal heart sounds. No murmur heard.  Pulmonary:      Effort: Pulmonary effort is normal.      Breath sounds: Normal breath sounds. No wheezing.   Abdominal:      General: Bowel sounds are normal. There is no distension.      Palpations: Abdomen is soft. There is no mass.      Tenderness: There is no abdominal tenderness.   Musculoskeletal:         General: No swelling, tenderness or deformity. Normal range of motion.      Cervical back: Normal range of motion and neck supple. No tenderness. No muscular tenderness.      Right lower leg: No edema.      Left lower leg: No edema.   Lymphadenopathy:      Cervical: No cervical adenopathy.   Skin:     General: Skin is warm.      Capillary Refill: Capillary refill takes less than 2 seconds.   Neurological:      General: No focal deficit present.      Mental Status: She is alert and oriented to person, place, and time.      Cranial Nerves: No cranial nerve deficit.      Sensory: No sensory deficit.      Motor: No weakness or abnormal muscle tone.      Coordination: Coordination normal.      Gait: Gait normal.      Deep Tendon Reflexes: Reflexes normal.      Comments: Minicog 5/5   Psychiatric:         Mood and Affect: Mood normal.         Behavior: Behavior normal.         Thought Content: Thought content normal.         Judgment: Judgment normal.      Comments: ,PHQ-2/9 Depression Screening    Little interest or  pleasure in doing things: 0 - not at all  Feeling down, depressed, or hopeless: 0 - not at all  PHQ-2 Score: 0  PHQ-2 Interpretation: Negative depression screen

## 2025-02-12 NOTE — PATIENT INSTRUCTIONS
Medicare Preventive Visit Patient Instructions  Thank you for completing your Welcome to Medicare Visit or Medicare Annual Wellness Visit today. Your next wellness visit will be due in one year (2/13/2026).  The screening/preventive services that you may require over the next 5-10 years are detailed below. Some tests may not apply to you based off risk factors and/or age. Screening tests ordered at today's visit but not completed yet may show as past due. Also, please note that scanned in results may not display below.  Preventive Screenings:  Service Recommendations Previous Testing/Comments   Colorectal Cancer Screening  * Colonoscopy    * Fecal Occult Blood Test (FOBT)/Fecal Immunochemical Test (FIT)  * Fecal DNA/Cologuard Test  * Flexible Sigmoidoscopy Age: 45-75 years old   Colonoscopy: every 10 years (may be performed more frequently if at higher risk)  OR  FOBT/FIT: every 1 year  OR  Cologuard: every 3 years  OR  Sigmoidoscopy: every 5 years  Screening may be recommended earlier than age 45 if at higher risk for colorectal cancer. Also, an individualized decision between you and your healthcare provider will decide whether screening between the ages of 76-85 would be appropriate. Colonoscopy: 06/11/2020  FOBT/FIT: Not on file  Cologuard: Not on file  Sigmoidoscopy: Not on file    Screening Current     Breast Cancer Screening Age: 40+ years old  Frequency: every 1-2 years  Not required if history of left and right mastectomy Mammogram: 10/01/2024    Screening Current   Cervical Cancer Screening Between the ages of 21-29, pap smear recommended once every 3 years.   Between the ages of 30-65, can perform pap smear with HPV co-testing every 5 years.   Recommendations may differ for women with a history of total hysterectomy, cervical cancer, or abnormal pap smears in past. Pap Smear: 02/26/2024    History Cervical Cancer   Hepatitis C Screening Once for adults born between 1945 and 1965  More frequently in  patients at high risk for Hepatitis C Hep C Antibody: Not on file    Risks and Benefits Discussed  Due for Hepatitis C screening   Diabetes Screening 1-2 times per year if you're at risk for diabetes or have pre-diabetes Fasting glucose: 94 mg/dL (8/24/2024)  A1C: No results in last 5 years (No results in last 5 years)  Screening Current   Cholesterol Screening Once every 5 years if you don't have a lipid disorder. May order more often based on risk factors. Lipid panel: 07/24/2024    Screening Not Indicated  History Lipid Disorder     Other Preventive Screenings Covered by Medicare:  Abdominal Aortic Aneurysm (AAA) Screening: covered once if your at risk. You're considered to be at risk if you have a family history of AAA.  Lung Cancer Screening: covers low dose CT scan once per year if you meet all of the following conditions: (1) Age 55-77; (2) No signs or symptoms of lung cancer; (3) Current smoker or have quit smoking within the last 15 years; (4) You have a tobacco smoking history of at least 20 pack years (packs per day multiplied by number of years you smoked); (5) You get a written order from a healthcare provider.  Glaucoma Screening: covered annually if you're considered high risk: (1) You have diabetes OR (2) Family history of glaucoma OR (3)  aged 50 and older OR (4)  American aged 65 and older  Osteoporosis Screening: covered every 2 years if you meet one of the following conditions: (1) You're estrogen deficient and at risk for osteoporosis based off medical history and other findings; (2) Have a vertebral abnormality; (3) On glucocorticoid therapy for more than 3 months; (4) Have primary hyperparathyroidism; (5) On osteoporosis medications and need to assess response to drug therapy.   Last bone density test (DXA Scan): 11/04/2024.  HIV Screening: covered annually if you're between the age of 15-65. Also covered annually if you are younger than 15 and older than 65 with risk  factors for HIV infection. For pregnant patients, it is covered up to 3 times per pregnancy.    Immunizations:  Immunization Recommendations   Influenza Vaccine Annual influenza vaccination during flu season is recommended for all persons aged >= 6 months who do not have contraindications   Pneumococcal Vaccine   * Pneumococcal conjugate vaccine = PCV13 (Prevnar 13), PCV15 (Vaxneuvance), PCV20 (Prevnar 20)  * Pneumococcal polysaccharide vaccine = PPSV23 (Pneumovax) Adults 19-65 yo with certain risk factors or if 65+ yo  If never received any pneumonia vaccine: recommend Prevnar 20 (PCV20)  Give PCV20 if previously received 1 dose of PCV13 or PPSV23   Hepatitis B Vaccine 3 dose series if at intermediate or high risk (ex: diabetes, end stage renal disease, liver disease)   Respiratory syncytial virus (RSV) Vaccine - COVERED BY MEDICARE PART D  * RSVPreF3 (Arexvy) CDC recommends that adults 60 years of age and older may receive a single dose of RSV vaccine using shared clinical decision-making (SCDM)   Tetanus (Td) Vaccine - COST NOT COVERED BY MEDICARE PART B Following completion of primary series, a booster dose should be given every 10 years to maintain immunity against tetanus. Td may also be given as tetanus wound prophylaxis.   Tdap Vaccine - COST NOT COVERED BY MEDICARE PART B Recommended at least once for all adults. For pregnant patients, recommended with each pregnancy.   Shingles Vaccine (Shingrix) - COST NOT COVERED BY MEDICARE PART B  2 shot series recommended in those 19 years and older who have or will have weakened immune systems or those 50 years and older     Health Maintenance Due:      Topic Date Due   • Hepatitis C Screening  Never done   • HIV Screening  Never done   • Colorectal Cancer Screening  06/11/2025   • Breast Cancer Screening: Mammogram  10/01/2025     Immunizations Due:  There are no preventive care reminders to display for this patient.  Advance Directives   What are advance  directives?  Advance directives are legal documents that state your wishes and plans for medical care. These plans are made ahead of time in case you lose your ability to make decisions for yourself. Advance directives can apply to any medical decision, such as the treatments you want, and if you want to donate organs.   What are the types of advance directives?  There are many types of advance directives, and each state has rules about how to use them. You may choose a combination of any of the following:  Living will:  This is a written record of the treatment you want. You can also choose which treatments you do not want, which to limit, and which to stop at a certain time. This includes surgery, medicine, IV fluid, and tube feedings.   Durable power of  for healthcare (DPAHC):  This is a written record that states who you want to make healthcare choices for you when you are unable to make them for yourself. This person, called a proxy, is usually a family member or a friend. You may choose more than 1 proxy.  Do not resuscitate (DNR) order:  A DNR order is used in case your heart stops beating or you stop breathing. It is a request not to have certain forms of treatment, such as CPR. A DNR order may be included in other types of advance directives.  Medical directive:  This covers the care that you want if you are in a coma, near death, or unable to make decisions for yourself. You can list the treatments you want for each condition. Treatment may include pain medicine, surgery, blood transfusions, dialysis, IV or tube feedings, and a ventilator (breathing machine).  Values history:  This document has questions about your views, beliefs, and how you feel and think about life. This information can help others choose the care that you would choose.  Why are advance directives important?  An advance directive helps you control your care. Although spoken wishes may be used, it is better to have your wishes  written down. Spoken wishes can be misunderstood, or not followed. Treatments may be given even if you do not want them. An advance directive may make it easier for your family to make difficult choices about your care.   Urinary Incontinence   Urinary incontinence (UI)  is when you lose control of your bladder. UI develops because your bladder cannot store or empty urine properly. The 3 most common types of UI are stress incontinence, urge incontinence, or both.  Medicines:   May be given to help strengthen your bladder control. Report any side effects of medication to your healthcare provider.  Do pelvic muscle exercises often:  Your pelvic muscles help you stop urinating. Squeeze these muscles tight for 5 seconds, then relax for 5 seconds. Gradually work up to squeezing for 10 seconds. Do 3 sets of 15 repetitions a day, or as directed. This will help strengthen your pelvic muscles and improve bladder control.  Train your bladder:  Go to the bathroom at set times, such as every 2 hours, even if you do not feel the urge to go. You can also try to hold your urine when you feel the urge to go. For example, hold your urine for 5 minutes when you feel the urge to go. As that becomes easier, hold your urine for 10 minutes.   Self-care:   Keep a UI record.  Write down how often you leak urine and how much you leak. Make a note of what you were doing when you leaked urine.  Drink liquids as directed. You may need to limit the amount of liquid you drink to help control your urine leakage. Do not drink any liquid right before you go to bed. Limit or do not have drinks that contain caffeine or alcohol.   Prevent constipation.  Eat a variety of high-fiber foods. Good examples are high-fiber cereals, beans, vegetables, and whole-grain breads. Walking is the best way to trigger your intestines to have a bowel movement.  Exercise regularly and maintain a healthy weight.  Weight loss and exercise will decrease pressure on your  bladder and help you control your leakage.   Use a catheter as directed  to help empty your bladder. A catheter is a tiny, plastic tube that is put into your bladder to drain your urine.   Go to behavior therapy as directed.  Behavior therapy may be used to help you learn to control your urge to urinate.    Weight Management   Why it is important to manage your weight:  Being overweight increases your risk of health conditions such as heart disease, high blood pressure, type 2 diabetes, and certain types of cancer. It can also increase your risk for osteoarthritis, sleep apnea, and other respiratory problems. Aim for a slow, steady weight loss. Even a small amount of weight loss can lower your risk of health problems.  How to lose weight safely:  A safe and healthy way to lose weight is to eat fewer calories and get regular exercise. You can lose up about 1 pound a week by decreasing the number of calories you eat by 500 calories each day.   Healthy meal plan for weight management:  A healthy meal plan includes a variety of foods, contains fewer calories, and helps you stay healthy. A healthy meal plan includes the following:  Eat whole-grain foods more often.  A healthy meal plan should contain fiber. Fiber is the part of grains, fruits, and vegetables that is not broken down by your body. Whole-grain foods are healthy and provide extra fiber in your diet. Some examples of whole-grain foods are whole-wheat breads and pastas, oatmeal, brown rice, and bulgur.  Eat a variety of vegetables every day.  Include dark, leafy greens such as spinach, kale, xander greens, and mustard greens. Eat yellow and orange vegetables such as carrots, sweet potatoes, and winter squash.   Eat a variety of fruits every day.  Choose fresh or canned fruit (canned in its own juice or light syrup) instead of juice. Fruit juice has very little or no fiber.  Eat low-fat dairy foods.  Drink fat-free (skim) milk or 1% milk. Eat fat-free yogurt  and low-fat cottage cheese. Try low-fat cheeses such as mozzarella and other reduced-fat cheeses.  Choose meat and other protein foods that are low in fat.  Choose beans or other legumes such as split peas or lentils. Choose fish, skinless poultry (chicken or turkey), or lean cuts of red meat (beef or pork). Before you cook meat or poultry, cut off any visible fat.   Use less fat and oil.  Try baking foods instead of frying them. Add less fat, such as margarine, sour cream, regular salad dressing and mayonnaise to foods. Eat fewer high-fat foods. Some examples of high-fat foods include french fries, doughnuts, ice cream, and cakes.  Eat fewer sweets.  Limit foods and drinks that are high in sugar. This includes candy, cookies, regular soda, and sweetened drinks.  Exercise:  Exercise at least 30 minutes per day on most days of the week. Some examples of exercise include walking, biking, dancing, and swimming. You can also fit in more physical activity by taking the stairs instead of the elevator or parking farther away from stores. Ask your healthcare provider about the best exercise plan for you.      © Copyright [employer] 2018 Information is for End User's use only and may not be sold, redistributed or otherwise used for commercial purposes. All illustrations and images included in CareNotes® are the copyrighted property of [employer] or [employer]      Medicare Preventive Visit Patient Instructions  Thank you for completing your Welcome to Medicare Visit or Medicare Annual Wellness Visit today. Your next wellness visit will be due in one year (2/13/2026).  The screening/preventive services that you may require over the next 5-10 years are detailed below. Some tests may not apply to you based off risk factors and/or age. Screening tests ordered at today's visit but not completed yet may show as past due. Also, please note that scanned in results may not display below.  Preventive  Screenings:  Service Recommendations Previous Testing/Comments   Colorectal Cancer Screening  * Colonoscopy    * Fecal Occult Blood Test (FOBT)/Fecal Immunochemical Test (FIT)  * Fecal DNA/Cologuard Test  * Flexible Sigmoidoscopy Age: 45-75 years old   Colonoscopy: every 10 years (may be performed more frequently if at higher risk)  OR  FOBT/FIT: every 1 year  OR  Cologuard: every 3 years  OR  Sigmoidoscopy: every 5 years  Screening may be recommended earlier than age 45 if at higher risk for colorectal cancer. Also, an individualized decision between you and your healthcare provider will decide whether screening between the ages of 76-85 would be appropriate. Colonoscopy: 06/11/2020  FOBT/FIT: Not on file  Cologuard: Not on file  Sigmoidoscopy: Not on file    Screening Current     Breast Cancer Screening Age: 40+ years old  Frequency: every 1-2 years  Not required if history of left and right mastectomy Mammogram: 10/01/2024    Screening Current   Cervical Cancer Screening Between the ages of 21-29, pap smear recommended once every 3 years.   Between the ages of 30-65, can perform pap smear with HPV co-testing every 5 years.   Recommendations may differ for women with a history of total hysterectomy, cervical cancer, or abnormal pap smears in past. Pap Smear: 02/26/2024    History Cervical Cancer   Hepatitis C Screening Once for adults born between 1945 and 1965  More frequently in patients at high risk for Hepatitis C Hep C Antibody: Not on file    Risks and Benefits Discussed  Due for Hepatitis C screening   Diabetes Screening 1-2 times per year if you're at risk for diabetes or have pre-diabetes Fasting glucose: 94 mg/dL (8/24/2024)  A1C: No results in last 5 years (No results in last 5 years)  Screening Current   Cholesterol Screening Once every 5 years if you don't have a lipid disorder. May order more often based on risk factors. Lipid panel: 07/24/2024    Screening Not Indicated  History Lipid Disorder      Other Preventive Screenings Covered by Medicare:  Abdominal Aortic Aneurysm (AAA) Screening: covered once if your at risk. You're considered to be at risk if you have a family history of AAA.  Lung Cancer Screening: covers low dose CT scan once per year if you meet all of the following conditions: (1) Age 55-77; (2) No signs or symptoms of lung cancer; (3) Current smoker or have quit smoking within the last 15 years; (4) You have a tobacco smoking history of at least 20 pack years (packs per day multiplied by number of years you smoked); (5) You get a written order from a healthcare provider.  Glaucoma Screening: covered annually if you're considered high risk: (1) You have diabetes OR (2) Family history of glaucoma OR (3)  aged 50 and older OR (4)  American aged 65 and older  Osteoporosis Screening: covered every 2 years if you meet one of the following conditions: (1) You're estrogen deficient and at risk for osteoporosis based off medical history and other findings; (2) Have a vertebral abnormality; (3) On glucocorticoid therapy for more than 3 months; (4) Have primary hyperparathyroidism; (5) On osteoporosis medications and need to assess response to drug therapy.   Last bone density test (DXA Scan): 11/04/2024.  HIV Screening: covered annually if you're between the age of 15-65. Also covered annually if you are younger than 15 and older than 65 with risk factors for HIV infection. For pregnant patients, it is covered up to 3 times per pregnancy.    Immunizations:  Immunization Recommendations   Influenza Vaccine Annual influenza vaccination during flu season is recommended for all persons aged >= 6 months who do not have contraindications   Pneumococcal Vaccine   * Pneumococcal conjugate vaccine = PCV13 (Prevnar 13), PCV15 (Vaxneuvance), PCV20 (Prevnar 20)  * Pneumococcal polysaccharide vaccine = PPSV23 (Pneumovax) Adults 19-63 yo with certain risk factors or if 65+ yo  If never  received any pneumonia vaccine: recommend Prevnar 20 (PCV20)  Give PCV20 if previously received 1 dose of PCV13 or PPSV23   Hepatitis B Vaccine 3 dose series if at intermediate or high risk (ex: diabetes, end stage renal disease, liver disease)   Respiratory syncytial virus (RSV) Vaccine - COVERED BY MEDICARE PART D  * RSVPreF3 (Arexvy) CDC recommends that adults 60 years of age and older may receive a single dose of RSV vaccine using shared clinical decision-making (SCDM)   Tetanus (Td) Vaccine - COST NOT COVERED BY MEDICARE PART B Following completion of primary series, a booster dose should be given every 10 years to maintain immunity against tetanus. Td may also be given as tetanus wound prophylaxis.   Tdap Vaccine - COST NOT COVERED BY MEDICARE PART B Recommended at least once for all adults. For pregnant patients, recommended with each pregnancy.   Shingles Vaccine (Shingrix) - COST NOT COVERED BY MEDICARE PART B  2 shot series recommended in those 19 years and older who have or will have weakened immune systems or those 50 years and older     Health Maintenance Due:      Topic Date Due   • Hepatitis C Screening  Never done   • HIV Screening  Never done   • Colorectal Cancer Screening  06/11/2025   • Breast Cancer Screening: Mammogram  10/01/2025     Immunizations Due:  There are no preventive care reminders to display for this patient.  Advance Directives   What are advance directives?  Advance directives are legal documents that state your wishes and plans for medical care. These plans are made ahead of time in case you lose your ability to make decisions for yourself. Advance directives can apply to any medical decision, such as the treatments you want, and if you want to donate organs.   What are the types of advance directives?  There are many types of advance directives, and each state has rules about how to use them. You may choose a combination of any of the following:  Living will:  This is a written  record of the treatment you want. You can also choose which treatments you do not want, which to limit, and which to stop at a certain time. This includes surgery, medicine, IV fluid, and tube feedings.   Durable power of  for healthcare (DPAHC):  This is a written record that states who you want to make healthcare choices for you when you are unable to make them for yourself. This person, called a proxy, is usually a family member or a friend. You may choose more than 1 proxy.  Do not resuscitate (DNR) order:  A DNR order is used in case your heart stops beating or you stop breathing. It is a request not to have certain forms of treatment, such as CPR. A DNR order may be included in other types of advance directives.  Medical directive:  This covers the care that you want if you are in a coma, near death, or unable to make decisions for yourself. You can list the treatments you want for each condition. Treatment may include pain medicine, surgery, blood transfusions, dialysis, IV or tube feedings, and a ventilator (breathing machine).  Values history:  This document has questions about your views, beliefs, and how you feel and think about life. This information can help others choose the care that you would choose.  Why are advance directives important?  An advance directive helps you control your care. Although spoken wishes may be used, it is better to have your wishes written down. Spoken wishes can be misunderstood, or not followed. Treatments may be given even if you do not want them. An advance directive may make it easier for your family to make difficult choices about your care.   Urinary Incontinence   Urinary incontinence (UI)  is when you lose control of your bladder. UI develops because your bladder cannot store or empty urine properly. The 3 most common types of UI are stress incontinence, urge incontinence, or both.  Medicines:   May be given to help strengthen your bladder control. Report any  side effects of medication to your healthcare provider.  Do pelvic muscle exercises often:  Your pelvic muscles help you stop urinating. Squeeze these muscles tight for 5 seconds, then relax for 5 seconds. Gradually work up to squeezing for 10 seconds. Do 3 sets of 15 repetitions a day, or as directed. This will help strengthen your pelvic muscles and improve bladder control.  Train your bladder:  Go to the bathroom at set times, such as every 2 hours, even if you do not feel the urge to go. You can also try to hold your urine when you feel the urge to go. For example, hold your urine for 5 minutes when you feel the urge to go. As that becomes easier, hold your urine for 10 minutes.   Self-care:   Keep a UI record.  Write down how often you leak urine and how much you leak. Make a note of what you were doing when you leaked urine.  Drink liquids as directed. You may need to limit the amount of liquid you drink to help control your urine leakage. Do not drink any liquid right before you go to bed. Limit or do not have drinks that contain caffeine or alcohol.   Prevent constipation.  Eat a variety of high-fiber foods. Good examples are high-fiber cereals, beans, vegetables, and whole-grain breads. Walking is the best way to trigger your intestines to have a bowel movement.  Exercise regularly and maintain a healthy weight.  Weight loss and exercise will decrease pressure on your bladder and help you control your leakage.   Use a catheter as directed  to help empty your bladder. A catheter is a tiny, plastic tube that is put into your bladder to drain your urine.   Go to behavior therapy as directed.  Behavior therapy may be used to help you learn to control your urge to urinate.    Weight Management   Why it is important to manage your weight:  Being overweight increases your risk of health conditions such as heart disease, high blood pressure, type 2 diabetes, and certain types of cancer. It can also increase your  risk for osteoarthritis, sleep apnea, and other respiratory problems. Aim for a slow, steady weight loss. Even a small amount of weight loss can lower your risk of health problems.  How to lose weight safely:  A safe and healthy way to lose weight is to eat fewer calories and get regular exercise. You can lose up about 1 pound a week by decreasing the number of calories you eat by 500 calories each day.   Healthy meal plan for weight management:  A healthy meal plan includes a variety of foods, contains fewer calories, and helps you stay healthy. A healthy meal plan includes the following:  Eat whole-grain foods more often.  A healthy meal plan should contain fiber. Fiber is the part of grains, fruits, and vegetables that is not broken down by your body. Whole-grain foods are healthy and provide extra fiber in your diet. Some examples of whole-grain foods are whole-wheat breads and pastas, oatmeal, brown rice, and bulgur.  Eat a variety of vegetables every day.  Include dark, leafy greens such as spinach, kale, xander greens, and mustard greens. Eat yellow and orange vegetables such as carrots, sweet potatoes, and winter squash.   Eat a variety of fruits every day.  Choose fresh or canned fruit (canned in its own juice or light syrup) instead of juice. Fruit juice has very little or no fiber.  Eat low-fat dairy foods.  Drink fat-free (skim) milk or 1% milk. Eat fat-free yogurt and low-fat cottage cheese. Try low-fat cheeses such as mozzarella and other reduced-fat cheeses.  Choose meat and other protein foods that are low in fat.  Choose beans or other legumes such as split peas or lentils. Choose fish, skinless poultry (chicken or turkey), or lean cuts of red meat (beef or pork). Before you cook meat or poultry, cut off any visible fat.   Use less fat and oil.  Try baking foods instead of frying them. Add less fat, such as margarine, sour cream, regular salad dressing and mayonnaise to foods. Eat fewer high-fat  foods. Some examples of high-fat foods include french fries, doughnuts, ice cream, and cakes.  Eat fewer sweets.  Limit foods and drinks that are high in sugar. This includes candy, cookies, regular soda, and sweetened drinks.  Exercise:  Exercise at least 30 minutes per day on most days of the week. Some examples of exercise include walking, biking, dancing, and swimming. You can also fit in more physical activity by taking the stairs instead of the elevator or parking farther away from stores. Ask your healthcare provider about the best exercise plan for you.      © Copyright [employer] 2018 Information is for End User's use only and may not be sold, redistributed or otherwise used for commercial purposes. All illustrations and images included in CareNotes® are the copyrighted property of [employer] Inc[employer] or [employer]

## 2025-02-12 NOTE — ASSESSMENT & PLAN NOTE
Due for labs. Continue rosuvastatin. Recheck 6m  Orders:  •  rosuvastatin (CRESTOR) 20 MG tablet; Take 1 tablet (20 mg total) by mouth daily  •  Comprehensive metabolic panel; Future  •  Lipid panel; Future

## 2025-03-05 ENCOUNTER — RESULTS FOLLOW-UP (OUTPATIENT)
Dept: FAMILY MEDICINE CLINIC | Facility: CLINIC | Age: 65
End: 2025-03-05

## 2025-03-05 LAB
ALBUMIN SERPL-MCNC: 4 G/DL (ref 3.6–5.1)
ALBUMIN/GLOB SERPL: 1.5 (CALC) (ref 1–2.5)
ALP SERPL-CCNC: 80 U/L (ref 37–153)
ALT SERPL-CCNC: 14 U/L (ref 6–29)
AST SERPL-CCNC: 18 U/L (ref 10–35)
BASOPHILS # BLD AUTO: 39 CELLS/UL (ref 0–200)
BASOPHILS NFR BLD AUTO: 0.6 %
BILIRUB SERPL-MCNC: 1.1 MG/DL (ref 0.2–1.2)
BUN SERPL-MCNC: 19 MG/DL (ref 7–25)
BUN/CREAT SERPL: NORMAL (CALC) (ref 6–22)
CALCIUM SERPL-MCNC: 9.3 MG/DL (ref 8.6–10.4)
CHLORIDE SERPL-SCNC: 107 MMOL/L (ref 98–110)
CHOLEST SERPL-MCNC: 227 MG/DL
CHOLEST/HDLC SERPL: 3.5 (CALC)
CO2 SERPL-SCNC: 28 MMOL/L (ref 20–32)
CREAT SERPL-MCNC: 0.83 MG/DL (ref 0.5–1.05)
EOSINOPHIL # BLD AUTO: 286 CELLS/UL (ref 15–500)
EOSINOPHIL NFR BLD AUTO: 4.4 %
ERYTHROCYTE [DISTWIDTH] IN BLOOD BY AUTOMATED COUNT: 13.7 % (ref 11–15)
GFR/BSA.PRED SERPLBLD CYS-BASED-ARV: 78 ML/MIN/1.73M2
GLOBULIN SER CALC-MCNC: 2.7 G/DL (CALC) (ref 1.9–3.7)
GLUCOSE SERPL-MCNC: 99 MG/DL (ref 65–99)
HCT VFR BLD AUTO: 40.1 % (ref 35–45)
HCV AB SERPL QL IA: NORMAL
HDLC SERPL-MCNC: 64 MG/DL
HGB BLD-MCNC: 12.9 G/DL (ref 11.7–15.5)
LDLC SERPL CALC-MCNC: 147 MG/DL (CALC)
LYMPHOCYTES # BLD AUTO: 2087 CELLS/UL (ref 850–3900)
LYMPHOCYTES NFR BLD AUTO: 32.1 %
MCH RBC QN AUTO: 27.2 PG (ref 27–33)
MCHC RBC AUTO-ENTMCNC: 32.2 G/DL (ref 32–36)
MCV RBC AUTO: 84.6 FL (ref 80–100)
MONOCYTES # BLD AUTO: 527 CELLS/UL (ref 200–950)
MONOCYTES NFR BLD AUTO: 8.1 %
NEUTROPHILS # BLD AUTO: 3562 CELLS/UL (ref 1500–7800)
NEUTROPHILS NFR BLD AUTO: 54.8 %
NONHDLC SERPL-MCNC: 163 MG/DL (CALC)
PLATELET # BLD AUTO: 280 THOUSAND/UL (ref 140–400)
PMV BLD REES-ECKER: 10.5 FL (ref 7.5–12.5)
POTASSIUM SERPL-SCNC: 4.1 MMOL/L (ref 3.5–5.3)
PROT SERPL-MCNC: 6.7 G/DL (ref 6.1–8.1)
RBC # BLD AUTO: 4.74 MILLION/UL (ref 3.8–5.1)
SODIUM SERPL-SCNC: 141 MMOL/L (ref 135–146)
TRIGL SERPL-MCNC: 63 MG/DL
WBC # BLD AUTO: 6.5 THOUSAND/UL (ref 3.8–10.8)

## 2025-03-12 ENCOUNTER — ANNUAL EXAM (OUTPATIENT)
Dept: OBGYN CLINIC | Facility: CLINIC | Age: 65
End: 2025-03-12

## 2025-03-12 VITALS
RESPIRATION RATE: 18 BRPM | SYSTOLIC BLOOD PRESSURE: 148 MMHG | HEART RATE: 72 BPM | WEIGHT: 166.2 LBS | BODY MASS INDEX: 35.86 KG/M2 | HEIGHT: 57 IN | DIASTOLIC BLOOD PRESSURE: 83 MMHG

## 2025-03-12 DIAGNOSIS — N95.2 VAGINAL ATROPHY: Primary | ICD-10-CM

## 2025-03-12 DIAGNOSIS — Z98.890 HISTORY OF LOOP ELECTRICAL EXCISION PROCEDURE (LEEP): ICD-10-CM

## 2025-03-12 DIAGNOSIS — Z01.419 ENCOUNTER FOR ANNUAL ROUTINE GYNECOLOGICAL EXAMINATION: ICD-10-CM

## 2025-03-12 PROCEDURE — G0476 HPV COMBO ASSAY CA SCREEN: HCPCS | Performed by: OBSTETRICS & GYNECOLOGY

## 2025-03-12 PROCEDURE — G0145 SCR C/V CYTO,THINLAYER,RESCR: HCPCS | Performed by: OBSTETRICS & GYNECOLOGY

## 2025-03-12 PROCEDURE — G0101 CA SCREEN;PELVIC/BREAST EXAM: HCPCS | Performed by: OBSTETRICS & GYNECOLOGY

## 2025-03-12 NOTE — PROGRESS NOTES
"Name: Yudi Caba      : 1960      MRN: [id number]  Encounter Provider: Wai Holloway MD  Encounter Date: 3/12/2025   Encounter department: [facility] [place]  :  Assessment & Plan        Vaginal atrophy  Normal breast exam  Normal Pap smear   History of LEEP  for high-grade EDVIN  Colonoscopy with polyps   Osteoporosis  Normal mammogram 2024    Plan: Rx mammogram.  Check Pap smear.  Recommend healthy diet weightbearing and balancing exercises.      G0  Yudi Caba is a 64 y.o. female who presents the office for annual exam with no complaints.  Patient got  this past year and has been sexually active without pain vaginal bleeding or dyspareunia.  History of LEEP in  for ANDREAS-3.  Normal Pap smear in  and .  Denies any breast bowel or bladder issues.  No change in family history.  Medications reviewed.       Objective   /83 (BP Location: Right arm, Patient Position: Sitting, Cuff Size: Large)   Pulse 72   Resp 18   Ht 4' 9\" (1.448 m)   Wt 75.4 kg (166 lb 3.2 oz)   LMP  (LMP Unknown)   BMI 35.97 kg/m²      Physical Exam  Vitals and nursing note reviewed.   Constitutional:       General: She is not in acute distress.     Appearance: She is well-developed.   HENT:      Head: Normocephalic and atraumatic.   Eyes:      Conjunctiva/sclera: Conjunctivae normal.   Cardiovascular:      Rate and Rhythm: Normal rate and regular rhythm.      Heart sounds: No murmur heard.  Pulmonary:      Effort: Pulmonary effort is normal. No respiratory distress.      Breath sounds: Normal breath sounds.   Chest:   Breasts:     Right: Normal.      Left: Normal.   Abdominal:      Palpations: Abdomen is soft.      Tenderness: There is no abdominal tenderness.   Genitourinary:     Vagina: Normal.      Cervix: Normal.      Uterus: Normal.       Adnexa: Right adnexa normal and left adnexa normal.      Comments: Mild vaginal atrophy.  No uterine prolapse " cystocele or rectocele.  Musculoskeletal:         General: No swelling.      Cervical back: Neck supple.   Skin:     General: Skin is warm and dry.      Capillary Refill: Capillary refill takes less than 2 seconds.   Neurological:      Mental Status: She is alert.   Psychiatric:         Mood and Affect: Mood normal.

## 2025-03-18 ENCOUNTER — RESULTS FOLLOW-UP (OUTPATIENT)
Age: 65
End: 2025-03-18

## 2025-03-18 LAB
LAB AP GYN PRIMARY INTERPRETATION: NORMAL
Lab: NORMAL

## 2025-04-11 ENCOUNTER — OFFICE VISIT (OUTPATIENT)
Dept: GASTROENTEROLOGY | Facility: CLINIC | Age: 65
End: 2025-04-11
Payer: MEDICARE

## 2025-04-11 VITALS
WEIGHT: 165 LBS | BODY MASS INDEX: 35.6 KG/M2 | DIASTOLIC BLOOD PRESSURE: 85 MMHG | HEART RATE: 79 BPM | SYSTOLIC BLOOD PRESSURE: 152 MMHG | HEIGHT: 57 IN

## 2025-04-11 DIAGNOSIS — K63.5 POLYP OF COLON, UNSPECIFIED PART OF COLON, UNSPECIFIED TYPE: Primary | ICD-10-CM

## 2025-04-11 DIAGNOSIS — Z80.0 FAMILY HISTORY OF COLON CANCER IN MOTHER: ICD-10-CM

## 2025-04-11 DIAGNOSIS — K57.90 DIVERTICULAR DISEASE: ICD-10-CM

## 2025-04-11 PROCEDURE — 99203 OFFICE O/P NEW LOW 30 MIN: CPT | Performed by: INTERNAL MEDICINE

## 2025-04-11 RX ORDER — POLYETHYLENE GLYCOL 3350, SODIUM SULFATE ANHYDROUS, SODIUM BICARBONATE, SODIUM CHLORIDE, POTASSIUM CHLORIDE 236; 22.74; 6.74; 5.86; 2.97 G/4L; G/4L; G/4L; G/4L; G/4L
4 POWDER, FOR SOLUTION ORAL ONCE
Qty: 4000 ML | Refills: 0 | Status: SHIPPED | OUTPATIENT
Start: 2025-04-11 | End: 2025-04-11

## 2025-04-11 RX ORDER — SODIUM CHLORIDE, SODIUM LACTATE, POTASSIUM CHLORIDE, CALCIUM CHLORIDE 600; 310; 30; 20 MG/100ML; MG/100ML; MG/100ML; MG/100ML
125 INJECTION, SOLUTION INTRAVENOUS CONTINUOUS
OUTPATIENT
Start: 2025-04-11

## 2025-04-11 NOTE — H&P (VIEW-ONLY)
Name: Yudi Caba      : 1960      MRN: [id number]  Encounter Provider: Clovis Mullen MD  Encounter Date: 2025   Encounter department: [facility] [place] [address]  :  Assessment & Plan  Polyp of colon, unspecified part of colon, unspecified type  Personal history of colon polyp, rectum and transverse colon hyperplastic polyps 5 years ago, 5-year follow-up was recommended also because of patient's family history of colon cancer in mother.  Discussed at length with patient, she wants to proceed with colonoscopy, options discussed, colonoscopy procedure and prep reviewed at length.  Patient consents.    Orders:  •  Ambulatory Referral to Gastroenterology  •  Colonoscopy; Future  •  polyethylene glycol (Golytely) 4000 mL solution; Take 4,000 mL by mouth once for 1 dose Take according to instructions given by the office for colonoscopy bowel prep.    Family history of colon cancer in mother    Orders:  •  Colonoscopy; Future    Diverticular disease  History of diverticulosis, increase water and fiber in the diet to manage the bowels better.           History of Present Illness   HPI  Yudi Caba is a 64 y.o. female who presents to discuss colonoscopy, history of colon polyp, family history of colon cancer in mother, last colonoscopy 5 years ago.  Appetite is fair weight stable denies any blood in stools melena hematochezia constipation diarrhea chest pain shortness of breath fever chills rash.  She denies any significant upper GI symptoms of dysphagia coughing choking spells nocturnal reflux agitation bronchitis pneumonias.  No history of CVA stents pacemakers, diet medications more than 10 pertinent systems some of the current and prior records noted.      Review of Systems   Constitutional:  Negative for fever.   HENT:  Negative for trouble swallowing.    Respiratory:  Negative for cough, chest tightness and shortness of breath.    Cardiovascular:  Negative for  "chest pain.   Gastrointestinal:  Negative for abdominal distention, abdominal pain, anal bleeding, blood in stool, constipation, diarrhea, nausea, rectal pain and vomiting.   Genitourinary:  Negative for dysuria and hematuria.   Skin:  Negative for rash.   Neurological:  Negative for seizures.          Objective   /85 (Patient Position: Sitting, Cuff Size: Standard)   Pulse 79   Ht 4' 9\" (1.448 m)   Wt 74.8 kg (165 lb)   LMP  (LMP Unknown)   BMI 35.71 kg/m²      Physical Exam  Constitutional:       General: She is not in acute distress.     Appearance: She is normal weight. She is not ill-appearing.   HENT:      Mouth/Throat:      Mouth: Mucous membranes are moist.   Cardiovascular:      Rate and Rhythm: Normal rate.   Pulmonary:      Effort: No respiratory distress.      Breath sounds: No stridor. No wheezing or rales.   Abdominal:      General: Bowel sounds are normal. There is no distension.      Palpations: There is no mass.      Tenderness: There is no abdominal tenderness. There is no guarding or rebound.      Hernia: No hernia is present.   Skin:     Coloration: Skin is not jaundiced or pale.   Neurological:      Mental Status: She is oriented to person, place, and time.           "

## 2025-04-11 NOTE — PATIENT INSTRUCTIONS
Scheduled date of colonoscopy (as of today): 4/29/25  Physician performing colonoscopy: Dr. hernandez  Location of colonoscopy: [place]  Bowel prep reviewed with patient: Golytely  Instructions reviewed with patient by: Nishi CAMILO  Clearances: n/a

## 2025-04-11 NOTE — PROGRESS NOTES
Name: Yudi Caba      : 1960      MRN: [id number]  Encounter Provider: Clovis Mullen MD  Encounter Date: 2025   Encounter department: [facility] [place] [address]  :  Assessment & Plan  Polyp of colon, unspecified part of colon, unspecified type  Personal history of colon polyp, rectum and transverse colon hyperplastic polyps 5 years ago, 5-year follow-up was recommended also because of patient's family history of colon cancer in mother.  Discussed at length with patient, she wants to proceed with colonoscopy, options discussed, colonoscopy procedure and prep reviewed at length.  Patient consents.    Orders:  •  Ambulatory Referral to Gastroenterology  •  Colonoscopy; Future  •  polyethylene glycol (Golytely) 4000 mL solution; Take 4,000 mL by mouth once for 1 dose Take according to instructions given by the office for colonoscopy bowel prep.    Family history of colon cancer in mother    Orders:  •  Colonoscopy; Future    Diverticular disease  History of diverticulosis, increase water and fiber in the diet to manage the bowels better.           History of Present Illness   HPI  Yudi Caba is a 64 y.o. female who presents to discuss colonoscopy, history of colon polyp, family history of colon cancer in mother, last colonoscopy 5 years ago.  Appetite is fair weight stable denies any blood in stools melena hematochezia constipation diarrhea chest pain shortness of breath fever chills rash.  She denies any significant upper GI symptoms of dysphagia coughing choking spells nocturnal reflux agitation bronchitis pneumonias.  No history of CVA stents pacemakers, diet medications more than 10 pertinent systems some of the current and prior records noted.      Review of Systems   Constitutional:  Negative for fever.   HENT:  Negative for trouble swallowing.    Respiratory:  Negative for cough, chest tightness and shortness of breath.    Cardiovascular:  Negative for  "chest pain.   Gastrointestinal:  Negative for abdominal distention, abdominal pain, anal bleeding, blood in stool, constipation, diarrhea, nausea, rectal pain and vomiting.   Genitourinary:  Negative for dysuria and hematuria.   Skin:  Negative for rash.   Neurological:  Negative for seizures.          Objective   /85 (Patient Position: Sitting, Cuff Size: Standard)   Pulse 79   Ht 4' 9\" (1.448 m)   Wt 74.8 kg (165 lb)   LMP  (LMP Unknown)   BMI 35.71 kg/m²      Physical Exam  Constitutional:       General: She is not in acute distress.     Appearance: She is normal weight. She is not ill-appearing.   HENT:      Mouth/Throat:      Mouth: Mucous membranes are moist.   Cardiovascular:      Rate and Rhythm: Normal rate.   Pulmonary:      Effort: No respiratory distress.      Breath sounds: No stridor. No wheezing or rales.   Abdominal:      General: Bowel sounds are normal. There is no distension.      Palpations: There is no mass.      Tenderness: There is no abdominal tenderness. There is no guarding or rebound.      Hernia: No hernia is present.   Skin:     Coloration: Skin is not jaundiced or pale.   Neurological:      Mental Status: She is oriented to person, place, and time.           "

## 2025-04-29 ENCOUNTER — ANESTHESIA (OUTPATIENT)
Dept: GASTROENTEROLOGY | Facility: HOSPITAL | Age: 65
End: 2025-04-29
Payer: MEDICARE

## 2025-04-29 ENCOUNTER — ANESTHESIA EVENT (OUTPATIENT)
Dept: GASTROENTEROLOGY | Facility: HOSPITAL | Age: 65
End: 2025-04-29
Payer: MEDICARE

## 2025-04-29 ENCOUNTER — HOSPITAL ENCOUNTER (OUTPATIENT)
Dept: GASTROENTEROLOGY | Facility: HOSPITAL | Age: 65
Setting detail: OUTPATIENT SURGERY
Discharge: HOME | End: 2025-04-29
Attending: INTERNAL MEDICINE
Payer: MEDICARE

## 2025-04-29 VITALS
HEART RATE: 69 BPM | OXYGEN SATURATION: 97 % | SYSTOLIC BLOOD PRESSURE: 123 MMHG | HEIGHT: 57 IN | TEMPERATURE: 97.9 F | WEIGHT: 164 LBS | BODY MASS INDEX: 35.38 KG/M2 | DIASTOLIC BLOOD PRESSURE: 58 MMHG | RESPIRATION RATE: 16 BRPM

## 2025-04-29 DIAGNOSIS — Z80.0 FAMILY HISTORY OF COLON CANCER IN MOTHER: ICD-10-CM

## 2025-04-29 DIAGNOSIS — K63.5 POLYP OF COLON, UNSPECIFIED PART OF COLON, UNSPECIFIED TYPE: ICD-10-CM

## 2025-04-29 PROCEDURE — 88305 TISSUE EXAM BY PATHOLOGIST: CPT | Performed by: PATHOLOGY

## 2025-04-29 PROCEDURE — 45385 COLONOSCOPY W/LESION REMOVAL: CPT | Performed by: INTERNAL MEDICINE

## 2025-04-29 RX ORDER — PROPOFOL 10 MG/ML
INJECTION, EMULSION INTRAVENOUS AS NEEDED
Status: DISCONTINUED | OUTPATIENT
Start: 2025-04-29 | End: 2025-04-29

## 2025-04-29 RX ORDER — SODIUM CHLORIDE, SODIUM LACTATE, POTASSIUM CHLORIDE, CALCIUM CHLORIDE 600; 310; 30; 20 MG/100ML; MG/100ML; MG/100ML; MG/100ML
INJECTION, SOLUTION INTRAVENOUS CONTINUOUS PRN
Status: DISCONTINUED | OUTPATIENT
Start: 2025-04-29 | End: 2025-04-29

## 2025-04-29 RX ORDER — LIDOCAINE HYDROCHLORIDE 10 MG/ML
INJECTION, SOLUTION EPIDURAL; INFILTRATION; INTRACAUDAL; PERINEURAL AS NEEDED
Status: DISCONTINUED | OUTPATIENT
Start: 2025-04-29 | End: 2025-04-29

## 2025-04-29 RX ADMIN — PROPOFOL 120 MCG/KG/MIN: 10 INJECTION, EMULSION INTRAVENOUS at 14:54

## 2025-04-29 RX ADMIN — SODIUM CHLORIDE, SODIUM LACTATE, POTASSIUM CHLORIDE, AND CALCIUM CHLORIDE: .6; .31; .03; .02 INJECTION, SOLUTION INTRAVENOUS at 14:44

## 2025-04-29 RX ADMIN — LIDOCAINE HYDROCHLORIDE 50 MG: 10 INJECTION, SOLUTION EPIDURAL; INFILTRATION; INTRACAUDAL; PERINEURAL at 14:52

## 2025-04-29 RX ADMIN — PROPOFOL 100 MG: 10 INJECTION, EMULSION INTRAVENOUS at 14:53

## 2025-04-29 NOTE — ANESTHESIA PREPROCEDURE EVALUATION
Procedure:  COLONOSCOPY    Relevant Problems   CARDIO   (+) Hyperlipidemia      MUSCULOSKELETAL   (+) Chronic bilateral low back pain with left-sided sciatica   (+) Degenerative arthritis of thumb, right      NEURO/PSYCH   (+) Chronic bilateral low back pain with left-sided sciatica      PULMONARY   (+) Asthma        Physical Exam    Airway    Mallampati score: II  TM Distance: <3 FB  Neck ROM: full     Dental   No notable dental hx     Cardiovascular      Pulmonary      Other Findings  post-pubertal.      Anesthesia Plan  ASA Score- 2     Anesthesia Type- IV sedation with anesthesia with ASA Monitors.         Additional Monitors:     Airway Plan:            Plan Factors-Exercise tolerance (METS): >4 METS.    Chart reviewed.    Patient summary reviewed.    Patient is not a current smoker.  Patient did not smoke on day of surgery.            Induction- intravenous.    Postoperative Plan-     Perioperative Resuscitation Plan - Level 1 - Full Code.       Informed Consent- Anesthetic plan and risks discussed with patient.  I personally reviewed this patient with the CRNA. Discussed and agreed on the Anesthesia Plan with the CRNA..      NPO Status:  No vitals data found for the desired time range.

## 2025-04-29 NOTE — PERIOPERATIVE NURSING NOTE
Pt &  expressing understanding to discharge instructions, no complaints of pain. IV site removed. Pt tolerated snack uneventfully.

## 2025-04-29 NOTE — ANESTHESIA POSTPROCEDURE EVALUATION
Post-Op Assessment Note    CV Status:  Stable    Pain management: satisfactory to patient       Mental Status:  Awake and sleepy   Hydration Status:  Euvolemic   PONV Controlled:  Controlled   Airway Patency:  Patent     Post Op Vitals Reviewed: Yes    No anethesia notable event occurred.    Staff: Anesthesiologist, CRNA           Last Filed PACU Vitals:  Vitals Value Taken Time   Temp 97.9 °F (36.6 °C) 04/29/25 1512   Pulse 69 04/29/25 1512   /50 04/29/25 1512   Resp 18 04/29/25 1512   SpO2 97 % 04/29/25 1512       Modified Nigel:     Vitals Value Taken Time   Activity 2 04/29/25 1513   Respiration 2 04/29/25 1513   Circulation 2 04/29/25 1513   Consciousness 1 04/29/25 1513   Oxygen Saturation 2 04/29/25 1513     Modified Nigel Score: 9

## 2025-05-02 ENCOUNTER — RESULTS FOLLOW-UP (OUTPATIENT)
Dept: GASTROENTEROLOGY | Facility: CLINIC | Age: 65
End: 2025-05-02

## 2025-05-02 PROCEDURE — 88305 TISSUE EXAM BY PATHOLOGIST: CPT | Performed by: PATHOLOGY

## 2025-05-02 NOTE — RESULT ENCOUNTER NOTE
Please call the patient regarding her abnormal result. Has polyp, Follow up colonscopy in 5 years, for history of polyps and family history.  Follow up in my office as needed

## 2025-05-10 DIAGNOSIS — E78.2 MIXED HYPERLIPIDEMIA: ICD-10-CM

## 2025-05-11 RX ORDER — ROSUVASTATIN CALCIUM 20 MG/1
20 TABLET, COATED ORAL DAILY
Qty: 90 TABLET | Refills: 1 | Status: SHIPPED | OUTPATIENT
Start: 2025-05-11

## 2025-08-14 ENCOUNTER — OFFICE VISIT (OUTPATIENT)
Dept: FAMILY MEDICINE CLINIC | Facility: CLINIC | Age: 65
End: 2025-08-14
Payer: MEDICARE

## 2025-08-14 VITALS
WEIGHT: 171.4 LBS | RESPIRATION RATE: 16 BRPM | BODY MASS INDEX: 36.98 KG/M2 | DIASTOLIC BLOOD PRESSURE: 84 MMHG | HEIGHT: 57 IN | OXYGEN SATURATION: 95 % | HEART RATE: 76 BPM | TEMPERATURE: 97.2 F | SYSTOLIC BLOOD PRESSURE: 140 MMHG

## 2025-08-14 DIAGNOSIS — J45.20 MILD INTERMITTENT ASTHMA WITHOUT COMPLICATION: ICD-10-CM

## 2025-08-14 DIAGNOSIS — E78.2 MIXED HYPERLIPIDEMIA: Primary | ICD-10-CM

## 2025-08-14 PROCEDURE — G2211 COMPLEX E/M VISIT ADD ON: HCPCS | Performed by: FAMILY MEDICINE

## 2025-08-14 PROCEDURE — 99214 OFFICE O/P EST MOD 30 MIN: CPT | Performed by: FAMILY MEDICINE

## 2025-08-14 RX ORDER — ROSUVASTATIN CALCIUM 20 MG/1
20 TABLET, COATED ORAL DAILY
Qty: 90 TABLET | Refills: 1 | Status: SHIPPED | OUTPATIENT
Start: 2025-08-14

## (undated) DEVICE — STERILE SURGICAL LUBRICANT,  TUBE: Brand: SURGILUBE

## (undated) DEVICE — BETHLEHEM UNIVERSAL MINOR VAG: Brand: CARDINAL HEALTH

## (undated) DEVICE — CUP MEDICINE 1OZ 5000/CS 50/PLT: Brand: MEDEGEN MEDICAL PRODUCTS, LLC

## (undated) DEVICE — GLOVE SRG LF STRL BGL SKNSNS 7.5 PF

## (undated) DEVICE — PVC URETHRAL CATHETER: Brand: DOVER

## (undated) DEVICE — VIAL DECANTER

## (undated) DEVICE — CONNECTOR SIMS STRL

## (undated) DEVICE — PAD GROUNDING ADULT

## (undated) DEVICE — SPECIMEN CONTAINER STERILE PEEL PACK

## (undated) DEVICE — PREMIUM DRY TRAY LF: Brand: MEDLINE INDUSTRIES, INC.

## (undated) DEVICE — STRL ALLENTOWN HYSTEROSCOPY PK: Brand: CARDINAL HEALTH

## (undated) DEVICE — SPONGE LAP 18 X 18 IN STRL RFD

## (undated) DEVICE — SMOKE EVACUATION TUBING WITH 7/8 IN TO 1/4 IN REDUCER: Brand: BUFFALO FILTER

## (undated) DEVICE — PENCIL ELECTROSURG E-Z CLEAN -0035H

## (undated) DEVICE — GLOVE INDICATOR PI UNDERGLOVE SZ 8 BLUE

## (undated) DEVICE — FIBER LASER ENT ELEVATE ELITE

## (undated) DEVICE — CHLORHEXIDINE 4PCT 4 OZ

## (undated) DEVICE — TUBING SUCTION 5MM X 12 FT

## (undated) DEVICE — MEDI-VAC YANK SUCT HNDL W/TPRD BULBOUS TIP: Brand: CARDINAL HEALTH

## (undated) DEVICE — ELECTRODE BALL E-Z CLEAN 2IN -0015

## (undated) DEVICE — APPLICATOR 6 IN COTTON UNSTRL

## (undated) DEVICE — STERILE 8 INCH PROCTO SWAB: Brand: CARDINAL HEALTH